# Patient Record
Sex: MALE | Race: WHITE | Employment: FULL TIME | URBAN - METROPOLITAN AREA
[De-identification: names, ages, dates, MRNs, and addresses within clinical notes are randomized per-mention and may not be internally consistent; named-entity substitution may affect disease eponyms.]

---

## 2021-09-03 ENCOUNTER — OFFICE VISIT (OUTPATIENT)
Dept: OTOLARYNGOLOGY | Facility: CLINIC | Age: 46
End: 2021-09-03
Payer: COMMERCIAL

## 2021-09-03 VITALS
WEIGHT: 224.4 LBS | DIASTOLIC BLOOD PRESSURE: 87 MMHG | HEART RATE: 95 BPM | BODY MASS INDEX: 31.42 KG/M2 | OXYGEN SATURATION: 99 % | HEIGHT: 71 IN | SYSTOLIC BLOOD PRESSURE: 143 MMHG

## 2021-09-03 DIAGNOSIS — J33.9 NASAL POLYPOSIS: ICD-10-CM

## 2021-09-03 DIAGNOSIS — G47.33 OSA (OBSTRUCTIVE SLEEP APNEA): Primary | ICD-10-CM

## 2021-09-03 DIAGNOSIS — J31.0 CHRONIC RHINITIS: ICD-10-CM

## 2021-09-03 PROCEDURE — 99204 OFFICE O/P NEW MOD 45 MIN: CPT | Performed by: OTOLARYNGOLOGY

## 2021-09-03 RX ORDER — AZELASTINE HYDROCHLORIDE, FLUTICASONE PROPIONATE 137; 50 UG/1; UG/1
SPRAY, METERED NASAL 2 TIMES DAILY
COMMUNITY
End: 2022-06-21

## 2021-09-03 RX ORDER — FLUTICASONE PROPIONATE 50 MCG
1 SPRAY, SUSPENSION (ML) NASAL DAILY
COMMUNITY
End: 2021-12-06

## 2021-09-03 RX ORDER — BUDESONIDE 0.5 MG/2ML
0.5 INHALANT ORAL DAILY
Qty: 60 ML | Refills: 11 | Status: SHIPPED | OUTPATIENT
Start: 2021-09-03 | End: 2022-04-04

## 2021-09-03 RX ORDER — MONTELUKAST SODIUM 10 MG/1
10 TABLET ORAL
COMMUNITY
Start: 2020-12-09 | End: 2022-04-28

## 2021-09-03 ASSESSMENT — MIFFLIN-ST. JEOR: SCORE: 1925

## 2021-09-03 NOTE — LETTER
"    9/3/2021         RE: Silviano Collins  [address] [facility] [address]        Dear Colleague,    Thank you for referring your patient, Silviano Collins, to the [facility]. Please see a copy of my visit note below.    History of Present Illness - Silviano Collins is a very pleasant 45 year old male here to see me for the first time due to nasal issues.    He constantly has issues with swelling of the face under the eyes.  He especially gets issues with the changes of the seasons.  He had previous functional endoscopic sinus surgery years ago, but he does not feel that it made much of a difference.    He has had allergy testing, and he tells me that it was \"everything.\"  He was tried on Flonase, Azelastine.  He was doing both, and using them twice daily.  Singulair, and Advair.    He was previously told by his [facility] sleep doctor that he needs to get his supplies through [facility] now.    Past Medical History -   Patient Active Problem List   Diagnosis     Allergic rhinitis due to other allergen     Mild intermittent asthma       Current Medications -   Current Outpatient Medications:      MULTI-VITAMIN OR TABS, 1 tab daily, Disp: , Rfl:      ZYRTEC 10 MG OR TABS, 1 tab daily prn , Disp: 30, Rfl: 10    Allergies -   Allergies   Allergen Reactions     No Known Drug Allergies        Social History -   Social History     Socioeconomic History     Marital status: Single     Spouse name: Not on file     Number of children: Not on file     Years of education: Not on file     Highest education level: Not on file   Occupational History     Not on file   Tobacco Use     Smoking status: Current Every Day Smoker     Tobacco comment: socially   Substance and Sexual Activity     Alcohol use: Yes     Comment: 2x month     Drug use: Not on file     Sexual activity: Not on file   Other Topics Concern     Not on file   Social History Narrative     Not on file     Social Determinants " "of Health     Financial Resource Strain:      Difficulty of Paying Living Expenses:    Food Insecurity:      Worried About Running Out of Food in the Last Year:      Ran Out of Food in the Last Year:    Transportation Needs:      Lack of Transportation (Medical):      Lack of Transportation (Non-Medical):    Physical Activity:      Days of Exercise per Week:      Minutes of Exercise per Session:    Stress:      Feeling of Stress :    Social Connections:      Frequency of Communication with Friends and Family:      Frequency of Social Gatherings with Friends and Family:      Attends Islam Services:      Active Member of Clubs or Organizations:      Attends Club or Organization Meetings:      Marital Status:    Intimate Partner Violence:      Fear of Current or Ex-Partner:      Emotionally Abused:      Physically Abused:      Sexually Abused:        Family History -   Family History   Problem Relation Age of Onset     Diabetes Mother         D: 40 pneumonia,  Type I DM     Family History Negative Father      Family History Negative Brother        Review of Systems - As per HPI and PMHx, otherwise 10+ system review of the head and neck, and general constitution is negative.    Physical Exam  BP (!) 143/87   Pulse 95   Ht 1.803 m (5' 11\")   Wt 101.8 kg (224 lb 6.4 oz)   SpO2 99%   BMI 31.30 kg/m      General - The patient is well nourished and well developed, and appears to have good nutritional status.  Alert and oriented to person and place, answers questions and cooperates with examination appropriately.   Head and Face - Normocephalic and atraumatic, with no gross asymmetry noted of the contour of the facial features other than some swelling of the LEFT upper cheek and lower LEFT lid.  The facial nerve is intact, with strong symmetric movements.  Voice and Breathing - The patient was breathing comfortably without the use of accessory muscles. There was no wheezing, stridor, or stertor.  The patients voice " was clear and strong, and had appropriate pitch and quality.  Ears - The tympanic membranes are normal in appearance, bony landmarks are intact.  No retraction, perforation, or masses.  No fluid or purulence was seen in the external canal or the middle ear. No evidence of infection of the middle ear or external canal, cerumen was normal in appearance.  Eyes - Extraocular movements intact, and the pupils were reactive to light.  Sclera were not icteric or injected, conjunctiva were pink and moist.  Mouth - Examination of the oral cavity showed pink, healthy oral mucosa. No lesions or ulcerations noted.  The tongue was mobile and midline, and the dentition were in good condition.    Throat - The walls of the oropharynx were smooth, pink, moist, symmetric, and had no lesions or ulcerations.  The tonsillar pillars and soft palate were symmetric.  The uvula was midline on elevation.    Neck - Normal midline excursion of the laryngotracheal complex during swallowing.  Full range of motion on passive movement.  Palpation of the occipital, submental, submandibular, internal jugular chain, and supraclavicular nodes did not demonstrate any abnormal lymph nodes or masses.  The carotid pulse was palpable bilaterally.  Palpation of the thyroid was soft and smooth, with no nodules or goiter appreciated.  The trachea was mobile and midline.  Nose - External contour is symmetric, no gross deflection or scars.  Nasal mucosa is globally boggy and edematous with copious clear secretions.  I believe I can see a polyp on the LEFT side between the septum and middle turbiante      A/P - Silviano Collins is a 45 year old male  (G47.33) KRISTEL (obstructive sleep apnea)  (primary encounter diagnosis)  (J31.0) Chronic rhinitis  (J33.9) Nasal polyposis    The first thing I would add to his allergy management is Budesonide mixed into NeilMed saline irrigations, and do that daily.    Next, since he is establishing care with me, I would like a CT  sinus for a baseline, and will call him with results.  Hopefully it will not show surgical disease.    Finally, I would also the scan because the swelling under the LEFT eye is likely allergic rhinitis side effect, but there might be a very subtle proptosis, and I would a view of his LEFT orbit as well.      Again, thank you for allowing me to participate in the care of your patient.        Sincerely,        Evens Srivastava MD

## 2021-09-03 NOTE — PROGRESS NOTES
"History of Present Illness - Silviano Collins is a very pleasant 45 year old male here to see me for the first time due to nasal issues.    He constantly has issues with swelling of the face under the eyes.  He especially gets issues with the changes of the seasons.  He had previous functional endoscopic sinus surgery years ago, but he does not feel that it made much of a difference.    He has had allergy testing, and he tells me that it was \"everything.\"  He was tried on Flonase, Azelastine.  He was doing both, and using them twice daily.  Singulair, and Advair.    He was previously told by his [facility] sleep doctor that he needs to get his supplies through [facility] now.    Past Medical History -   Patient Active Problem List   Diagnosis     Allergic rhinitis due to other allergen     Mild intermittent asthma       Current Medications -   Current Outpatient Medications:      MULTI-VITAMIN OR TABS, 1 tab daily, Disp: , Rfl:      ZYRTEC 10 MG OR TABS, 1 tab daily prn , Disp: 30, Rfl: 10    Allergies -   Allergies   Allergen Reactions     No Known Drug Allergies        Social History -   Social History     Socioeconomic History     Marital status: Single     Spouse name: Not on file     Number of children: Not on file     Years of education: Not on file     Highest education level: Not on file   Occupational History     Not on file   Tobacco Use     Smoking status: Current Every Day Smoker     Tobacco comment: socially   Substance and Sexual Activity     Alcohol use: Yes     Comment: 2x month     Drug use: Not on file     Sexual activity: Not on file   Other Topics Concern     Not on file   Social History Narrative     Not on file     Social Determinants of Health     Financial Resource Strain:      Difficulty of Paying Living Expenses:    Food Insecurity:      Worried About Running Out of Food in the Last Year:      Ran Out of Food in the Last Year:    Transportation Needs:      Lack of Transportation (Medical):      " "Lack of Transportation (Non-Medical):    Physical Activity:      Days of Exercise per Week:      Minutes of Exercise per Session:    Stress:      Feeling of Stress :    Social Connections:      Frequency of Communication with Friends and Family:      Frequency of Social Gatherings with Friends and Family:      Attends Tenriism Services:      Active Member of Clubs or Organizations:      Attends Club or Organization Meetings:      Marital Status:    Intimate Partner Violence:      Fear of Current or Ex-Partner:      Emotionally Abused:      Physically Abused:      Sexually Abused:        Family History -   Family History   Problem Relation Age of Onset     Diabetes Mother         D: 40 pneumonia,  Type I DM     Family History Negative Father      Family History Negative Brother        Review of Systems - As per HPI and PMHx, otherwise 10+ system review of the head and neck, and general constitution is negative.    Physical Exam  BP (!) 143/87   Pulse 95   Ht 1.803 m (5' 11\")   Wt 101.8 kg (224 lb 6.4 oz)   SpO2 99%   BMI 31.30 kg/m      General - The patient is well nourished and well developed, and appears to have good nutritional status.  Alert and oriented to person and place, answers questions and cooperates with examination appropriately.   Head and Face - Normocephalic and atraumatic, with no gross asymmetry noted of the contour of the facial features other than some swelling of the LEFT upper cheek and lower LEFT lid.  The facial nerve is intact, with strong symmetric movements.  Voice and Breathing - The patient was breathing comfortably without the use of accessory muscles. There was no wheezing, stridor, or stertor.  The patients voice was clear and strong, and had appropriate pitch and quality.  Ears - The tympanic membranes are normal in appearance, bony landmarks are intact.  No retraction, perforation, or masses.  No fluid or purulence was seen in the external canal or the middle ear. No evidence " of infection of the middle ear or external canal, cerumen was normal in appearance.  Eyes - Extraocular movements intact, and the pupils were reactive to light.  Sclera were not icteric or injected, conjunctiva were pink and moist.  Mouth - Examination of the oral cavity showed pink, healthy oral mucosa. No lesions or ulcerations noted.  The tongue was mobile and midline, and the dentition were in good condition.    Throat - The walls of the oropharynx were smooth, pink, moist, symmetric, and had no lesions or ulcerations.  The tonsillar pillars and soft palate were symmetric.  The uvula was midline on elevation.    Neck - Normal midline excursion of the laryngotracheal complex during swallowing.  Full range of motion on passive movement.  Palpation of the occipital, submental, submandibular, internal jugular chain, and supraclavicular nodes did not demonstrate any abnormal lymph nodes or masses.  The carotid pulse was palpable bilaterally.  Palpation of the thyroid was soft and smooth, with no nodules or goiter appreciated.  The trachea was mobile and midline.  Nose - External contour is symmetric, no gross deflection or scars.  Nasal mucosa is globally boggy and edematous with copious clear secretions.  I believe I can see a polyp on the LEFT side between the septum and middle turbiante      A/P - Silviano Collins is a 45 year old male  (G47.33) KRISTEL (obstructive sleep apnea)  (primary encounter diagnosis)  (J31.0) Chronic rhinitis  (J33.9) Nasal polyposis    The first thing I would add to his allergy management is Budesonide mixed into NeilMed saline irrigations, and do that daily.    Next, since he is establishing care with me, I would like a CT sinus for a baseline, and will call him with results.  Hopefully it will not show surgical disease.    Finally, I would also the scan because the swelling under the LEFT eye is likely allergic rhinitis side effect, but there might be a very subtle proptosis, and I would  a view of his LEFT orbit as well.

## 2021-09-05 ENCOUNTER — TELEPHONE (OUTPATIENT)
Dept: SLEEP MEDICINE | Facility: CLINIC | Age: 46
End: 2021-09-05

## 2021-09-05 NOTE — TELEPHONE ENCOUNTER
Reason for call:  Other   Patient called regarding (reason for call): call back  Additional comments: refill     Phone number to reach patient:  Home number on file [phone] (home)    Best Time:  any    Can we leave a detailed message on this number?  YES    Travel screening: Not Applicable

## 2021-10-26 ENCOUNTER — VIRTUAL VISIT (OUTPATIENT)
Dept: FAMILY MEDICINE | Facility: CLINIC | Age: 46
End: 2021-10-26
Payer: COMMERCIAL

## 2021-10-26 DIAGNOSIS — R21 RASH: Primary | ICD-10-CM

## 2021-10-26 PROCEDURE — 99203 OFFICE O/P NEW LOW 30 MIN: CPT | Mod: 95 | Performed by: NURSE PRACTITIONER

## 2021-10-26 RX ORDER — CLOTRIMAZOLE 1 %
CREAM (GRAM) TOPICAL 2 TIMES DAILY
Qty: 30 G | Refills: 0 | Status: SHIPPED | OUTPATIENT
Start: 2021-10-26 | End: 2021-12-23

## 2021-10-26 RX ORDER — TRIAMCINOLONE ACETONIDE 1 MG/G
CREAM TOPICAL 2 TIMES DAILY
Qty: 30 G | Refills: 0 | Status: SHIPPED | OUTPATIENT
Start: 2021-10-26 | End: 2021-12-23

## 2021-10-26 NOTE — PROGRESS NOTES
"Juanjose is a 46 year old who is being evaluated via a billable video visit.      How would you like to obtain your AVS? MyChart  If the video visit is dropped, the invitation should be resent by: Text to cell phone: [phone] TEXT PT\"S PHONE FOR VISIT  Will anyone else be joining your video visit? No      Video Start Time: 8:10 AM    Assessment & Plan     Rash  Etiology unclear. Discussed using topical steroid and antifungal together. Follow up in 2 weeks if no improvement.   - triamcinolone (KENALOG) 0.1 % external cream; Apply topically 2 times daily For up to 14 days.  - clotrimazole (LOTRIMIN) 1 % external cream; Apply topically 2 times daily For 14 DAYS       BMI:   Estimated body mass index is 31.3 kg/m  as calculated from the following:    Height as of 9/3/21: 1.803 m (5' 11\").    Weight as of 9/3/21: 101.8 kg (224 lb 6.4 oz).       Patient Instructions   Start with the triamcinolone twice daily alone or with the clotrimazole.  If no improvement in 2 weeks follow-up.  If this is fungal it can take up to a month for this to clear.  I would expect to see improvement in 2 weeks though.    If worsening follow-up in clinic.      No follow-ups on file.    Portia Brumfield, RYAN CNP  [facility]    Jose Guadalupe Sow is a 46 year old who presents for the following health issues     HPI     Chief Complaint   Patient presents with     spot on foot     pt has a red spot on right foot that itches size of a golfball      Circular raised lesion on the right foot that is above the 3-5 toes. It has been present for about 6 weeks. Topical treatments tried lotion, hydrocortisone cream. This seemed to dry it up. Patient reports that it looks better than it did, it is less red. He tried an antifungal terbinafine OTC, this did not change symptoms after bid for 4 days.         Review of Systems   Constitutional, HEENT, cardiovascular, pulmonary, gi and gu systems are negative, except as otherwise noted.    "   Objective           Vitals:  No vitals were obtained today due to virtual visit.    Physical Exam   GENERAL: Healthy, alert and no distress  EYES: Eyes grossly normal to inspection.  No discharge or erythema, or obvious scleral/conjunctival abnormalities.  RESP: No audible wheeze, cough, or visible cyanosis.  No visible retractions or increased work of breathing.    SKIN: well demarcated circular rash on right dorsal surface. Appears approx 3cm diameter.  Erythematous with skin flaking. NEURO: Cranial nerves grossly intact.  Mentation and speech appropriate for age.  PSYCH: Mentation appears normal, affect normal/bright, judgement and insight intact, normal speech and appearance well-groomed.                Video-Visit Details    Type of service:  Video Visit    Video End Time:8:23 AM    Originating Location (pt. Location): Home    Distant Location (provider location):  [facility]     Platform used for Video Visit: FIXO

## 2021-10-26 NOTE — PATIENT INSTRUCTIONS
Start with the triamcinolone twice daily alone or with the clotrimazole.  If no improvement in 2 weeks follow-up.  If this is fungal it can take up to a month for this to clear.  I would expect to see improvement in 2 weeks though.    If worsening follow-up in clinic.

## 2021-10-27 ENCOUNTER — TELEPHONE (OUTPATIENT)
Dept: FAMILY MEDICINE | Facility: CLINIC | Age: 46
End: 2021-10-27

## 2021-10-27 DIAGNOSIS — J45.20 MILD INTERMITTENT ASTHMA WITHOUT COMPLICATION: Primary | ICD-10-CM

## 2021-10-27 NOTE — TELEPHONE ENCOUNTER
Reason for call:  Patient reporting a symptom    Symptom or request: Pt states that he was seen virtually yesterday with Portia Brumfield and forgot to ask for a refill of Advair?  Does he need another appt to get this med refill?  [facility]  Please call patient and advise.      Duration (how long have symptoms been present): ongoing    Have you been treated for this before? Yes    Additional comments:     Phone Number patient can be reached at:  Home number on file [phone] (home)    Best Time:  any    Can we leave a detailed message on this number:  YES    Call taken on 10/27/2021 at 9:54 AM by Mari Sotne

## 2021-11-13 ENCOUNTER — HEALTH MAINTENANCE LETTER (OUTPATIENT)
Age: 46
End: 2021-11-13

## 2021-11-17 ENCOUNTER — MYC MEDICAL ADVICE (OUTPATIENT)
Dept: FAMILY MEDICINE | Facility: CLINIC | Age: 46
End: 2021-11-17
Payer: COMMERCIAL

## 2021-11-19 ENCOUNTER — HOSPITAL ENCOUNTER (EMERGENCY)
Facility: CLINIC | Age: 46
Discharge: HOME | End: 2021-11-20
Attending: EMERGENCY MEDICINE | Admitting: EMERGENCY MEDICINE
Payer: COMMERCIAL

## 2021-11-19 DIAGNOSIS — J18.9 COMMUNITY ACQUIRED PNEUMONIA OF RIGHT MIDDLE LOBE OF LUNG: ICD-10-CM

## 2021-11-19 DIAGNOSIS — J45.31 MILD PERSISTENT ASTHMA WITH EXACERBATION: ICD-10-CM

## 2021-11-19 PROCEDURE — 99284 EMERGENCY DEPT VISIT MOD MDM: CPT | Performed by: EMERGENCY MEDICINE

## 2021-11-19 PROCEDURE — 250N000012 HC RX MED GY IP 250 OP 636 PS 637: Performed by: EMERGENCY MEDICINE

## 2021-11-19 PROCEDURE — 250N000013 HC RX MED GY IP 250 OP 250 PS 637: Performed by: EMERGENCY MEDICINE

## 2021-11-19 PROCEDURE — C9803 HOPD COVID-19 SPEC COLLECT: HCPCS | Performed by: EMERGENCY MEDICINE

## 2021-11-19 PROCEDURE — 99284 EMERGENCY DEPT VISIT MOD MDM: CPT | Mod: 25 | Performed by: EMERGENCY MEDICINE

## 2021-11-19 PROCEDURE — 94640 AIRWAY INHALATION TREATMENT: CPT | Performed by: EMERGENCY MEDICINE

## 2021-11-19 RX ORDER — PREDNISONE 20 MG/1
60 TABLET ORAL ONCE
Status: COMPLETED | OUTPATIENT
Start: 2021-11-19 | End: 2021-11-19

## 2021-11-19 RX ORDER — INHALER, ASSIST DEVICES
1 SPACER (EA) MISCELLANEOUS ONCE
Status: COMPLETED | OUTPATIENT
Start: 2021-11-19 | End: 2021-11-19

## 2021-11-19 RX ORDER — ALBUTEROL SULFATE 90 UG/1
6 AEROSOL, METERED RESPIRATORY (INHALATION) ONCE
Status: COMPLETED | OUTPATIENT
Start: 2021-11-19 | End: 2021-11-19

## 2021-11-19 RX ADMIN — PREDNISONE 60 MG: 20 TABLET ORAL at 23:55

## 2021-11-19 RX ADMIN — ALBUTEROL SULFATE 6 PUFF: 90 AEROSOL, METERED RESPIRATORY (INHALATION) at 23:56

## 2021-11-19 RX ADMIN — Medication 1 EACH: at 23:56

## 2021-11-19 ASSESSMENT — MIFFLIN-ST. JEOR: SCORE: 1945.4

## 2021-11-20 ENCOUNTER — APPOINTMENT (OUTPATIENT)
Dept: GENERAL RADIOLOGY | Facility: CLINIC | Age: 46
End: 2021-11-20
Attending: EMERGENCY MEDICINE
Payer: COMMERCIAL

## 2021-11-20 VITALS
BODY MASS INDEX: 32.2 KG/M2 | HEIGHT: 71 IN | OXYGEN SATURATION: 96 % | SYSTOLIC BLOOD PRESSURE: 142 MMHG | HEART RATE: 85 BPM | RESPIRATION RATE: 20 BRPM | WEIGHT: 230 LBS | DIASTOLIC BLOOD PRESSURE: 92 MMHG | TEMPERATURE: 98.3 F

## 2021-11-20 LAB
FLUAV RNA SPEC QL NAA+PROBE: NEGATIVE
FLUBV RNA RESP QL NAA+PROBE: NEGATIVE
SARS-COV-2 RNA RESP QL NAA+PROBE: NEGATIVE

## 2021-11-20 PROCEDURE — 87636 SARSCOV2 & INF A&B AMP PRB: CPT | Performed by: EMERGENCY MEDICINE

## 2021-11-20 PROCEDURE — 71045 X-RAY EXAM CHEST 1 VIEW: CPT

## 2021-11-20 PROCEDURE — C9803 HOPD COVID-19 SPEC COLLECT: HCPCS | Performed by: EMERGENCY MEDICINE

## 2021-11-20 RX ORDER — PREDNISONE 20 MG/1
40 TABLET ORAL DAILY
Qty: 10 TABLET | Refills: 0 | Status: SHIPPED | OUTPATIENT
Start: 2021-11-20 | End: 2021-12-23

## 2021-11-20 NOTE — ED PROVIDER NOTES
History     Chief Complaint   Patient presents with     Shortness of Breath     HPI  Silviano Collins is a 46 year old male who is fully vaccinated against COVID-19 with recent booster and presents now with cough and fever.  Symptoms ongoing over the past 5 days.  He has had intermittent sweats and chills.  Headache, runny nose, sore throat, cough productive of green sputum.  He feels weak and rundown.  No vomiting or diarrhea.  No abdominal pain, dysuria, or rash.  He has been using his inhalers with mild improvement.    Allergies:  Allergies   Allergen Reactions     Grass Extracts [Gramineae Pollens] Other (See Comments)     No Known Drug Allergies        Problem List:    Patient Active Problem List    Diagnosis Date Noted     Allergic rhinitis due to other allergen 11/30/2005     Priority: Medium     seasonal allergies (spring and fall)       Mild intermittent asthma 11/30/2005     Priority: Medium     exercise, seasonal, associated with URIs          Past Medical History:    Past Medical History:   Diagnosis Date     Allergic rhinitis due to other allergen      Mild intermittent asthma        Past Surgical History:    Past Surgical History:   Procedure Laterality Date     HC TOOTH EXTRACTION W/FORCEP  1995    4 wisdom teeth removed withotu complications       Family History:    Family History   Problem Relation Age of Onset     Diabetes Mother         D: 40 pneumonia,  Type I DM     Family History Negative Father      Family History Negative Brother        Social History:  Marital Status:   [2]  Social History     Tobacco Use     Smoking status: Former Smoker     Smokeless tobacco: Never Used     Tobacco comment: socially   Vaping Use     Vaping Use: Never used   Substance Use Topics     Alcohol use: Yes     Comment: 2x month     Drug use: Not on file        Medications:    predniSONE (DELTASONE) 20 MG tablet  azelastine-fluticasone (DYMISTA) 137-50 MCG/ACT nasal spray  budesonide (PULMICORT) 0.5  "MG/2ML neb solution  clotrimazole (LOTRIMIN) 1 % external cream  fluticasone (FLONASE) 50 MCG/ACT nasal spray  fluticasone-salmeterol (ADVAIR) 100-50 MCG/DOSE inhaler  montelukast (SINGULAIR) 10 MG tablet  MULTI-VITAMIN OR TABS  triamcinolone (KENALOG) 0.1 % external cream  ZYRTEC 10 MG OR TABS          Review of Systems  Pertinent positives and negatives listed in the HPI, all other systems reviewed and are negative.    Physical Exam   BP: (!) 134/96  Pulse: 85  Resp: 20  Height: 180.3 cm (5' 11\")  Weight: 104.3 kg (230 lb)  SpO2: 96 %      Physical Exam  Vitals and nursing note reviewed.   Constitutional:       General: He is in acute distress.      Appearance: He is well-developed. He is not diaphoretic.   HENT:      Head: Normocephalic and atraumatic.      Right Ear: Tympanic membrane and external ear normal.      Left Ear: Tympanic membrane and external ear normal.      Nose: Nose normal.      Mouth/Throat:      Pharynx: No pharyngeal swelling, oropharyngeal exudate or posterior oropharyngeal erythema.      Tonsils: No tonsillar exudate or tonsillar abscesses.   Eyes:      General: No scleral icterus.     Conjunctiva/sclera: Conjunctivae normal.   Cardiovascular:      Rate and Rhythm: Normal rate and regular rhythm.      Heart sounds: No murmur heard.      Pulmonary:      Effort: Pulmonary effort is normal. No respiratory distress.      Breath sounds: No stridor. Wheezing present.   Abdominal:      General: There is no distension.      Palpations: Abdomen is soft.   Musculoskeletal:      Cervical back: Normal range of motion.      Right lower leg: No edema.      Left lower leg: No edema.   Lymphadenopathy:      Cervical: No cervical adenopathy.   Skin:     General: Skin is warm and dry.   Neurological:      Mental Status: He is alert and oriented to person, place, and time.   Psychiatric:         Behavior: Behavior normal.         ED Course        Procedures              Critical Care time:  none           "     Results for orders placed or performed during the hospital encounter of 11/19/21 (from the past 24 hour(s))   Symptomatic Influenza A/B & SARS-CoV2 (COVID-19) Virus PCR Multiplex Nasopharyngeal    Specimen: Nasopharyngeal; Swab   Result Value Ref Range    Influenza A target Negative Negative    Influenza B target Negative Negative    SARS CoV2 PCR Negative Negative    Narrative    Testing was performed using the marisel SARS-CoV-2 & Influenza A/B Assay on the marisel Caroline System. This test should be ordered for the detection of SARS-CoV-2 and influenza viruses in individuals who meet clinical and/or epidemiological criteria. Test performance is unknown in asymptomatic patients. This test is for in vitro diagnostic use under the FDA EUA for laboratories certified under CLIA to perform moderate and/or high complexity testing. This test has not been FDA cleared or approved. A negative result does not rule out the presence of PCR inhibitors in the specimen or target RNA in concentration below the limit of detection for the assay. If only one viral target is positive but coinfection with multiple targets is suspected, the sample should be re-tested with another FDA cleared, approved or authorized test, if coinfection would change clinical management. [facility] are certified under the Clinical Laboratory Improvement Amendments of 1988 (CLIA-88) as  qualified to perform moderate and/or high complexity laboratory testing.       Medications   albuterol (PROAIR HFA/PROVENTIL HFA/VENTOLIN HFA) 108 (90 Base) MCG/ACT inhaler 6 puff (6 puffs Inhalation Given 11/19/21 2356)   aerochamber with mouthpiece (NO mask) - > 5 years 1 each (1 each Inhalation Given 11/19/21 2356)   predniSONE (DELTASONE) tablet 60 mg (60 mg Oral Given 11/19/21 2355)       Assessments & Plan (with Medical Decision Making)   46-year-old male presents for cough and fever with wheezing with a history of asthma.  Heart 82, SPO2 is 96% on  room air, blood pressure is 134/96.  He is given albuterol and prednisone for his symptoms.  Chest x-ray obtained, images reviewed independently as well as radiology read reviewed, no signs of infiltrate to suggest pneumonia. Influenza and Covid swabs are negative. He is feeling better after the albuterol and is safe to discharge with reassurance and a short course of prednisone and instructions to drink plenty fluids, use albuterol as needed, return if worse, otherwise follow-up in clinic. The patient is in agreement with this plan.    I have reviewed the nursing notes.    I have reviewed the findings, diagnosis, plan and need for follow up with the patient.       New Prescriptions    PREDNISONE (DELTASONE) 20 MG TABLET    Take 2 tablets (40 mg) by mouth daily       Final diagnoses:   Mild persistent asthma with exacerbation   Viral URI with cough       11/19/2021   [facility] EMERGENCY DEPT     Usama Espinoza MD  11/20/21 0042

## 2021-11-20 NOTE — ED TRIAGE NOTES
"Cough and congestion since Monday had fever \"earlier in the week\"     Brings up green sputum     Increased difficulty breathing at night     scratchy voice  "

## 2021-12-06 ENCOUNTER — OFFICE VISIT (OUTPATIENT)
Dept: SLEEP MEDICINE | Facility: CLINIC | Age: 46
End: 2021-12-06
Payer: COMMERCIAL

## 2021-12-06 VITALS
OXYGEN SATURATION: 97 % | SYSTOLIC BLOOD PRESSURE: 123 MMHG | DIASTOLIC BLOOD PRESSURE: 84 MMHG | HEIGHT: 71 IN | WEIGHT: 224 LBS | HEART RATE: 74 BPM | BODY MASS INDEX: 31.36 KG/M2

## 2021-12-06 DIAGNOSIS — G47.33 OSA (OBSTRUCTIVE SLEEP APNEA): Primary | ICD-10-CM

## 2021-12-06 PROBLEM — E11.9 CONTROLLED TYPE 2 DIABETES MELLITUS WITHOUT COMPLICATION, WITHOUT LONG-TERM CURRENT USE OF INSULIN (H): Status: ACTIVE | Noted: 2020-02-19

## 2021-12-06 PROBLEM — E11.9 CONTROLLED TYPE 2 DIABETES MELLITUS WITHOUT COMPLICATION, WITHOUT LONG-TERM CURRENT USE OF INSULIN (H): Chronic | Status: ACTIVE | Noted: 2020-02-19

## 2021-12-06 PROCEDURE — 99204 OFFICE O/P NEW MOD 45 MIN: CPT | Performed by: PHYSICIAN ASSISTANT

## 2021-12-06 RX ORDER — FLUTICASONE PROPIONATE 50 MCG
1 SPRAY, SUSPENSION (ML) NASAL DAILY
Qty: 16 G | Refills: 3 | Status: SHIPPED | OUTPATIENT
Start: 2021-12-06 | End: 2022-04-28

## 2021-12-06 ASSESSMENT — MIFFLIN-ST. JEOR: SCORE: 1918.19

## 2021-12-06 NOTE — PATIENT INSTRUCTIONS
Your BMI is Body mass index is 31.24 kg/m .  Weight management is a personal decision.  If you are interested in exploring weight loss strategies, the following discussion covers the approaches that may be successful. Body mass index (BMI) is one way to tell whether you are at a healthy weight, overweight, or obese. It measures your weight in relation to your height.  A BMI of 18.5 to 24.9 is in the healthy range. A person with a BMI of 25 to 29.9 is considered overweight, and someone with a BMI of 30 or greater is considered obese. More than two-thirds of American adults are considered overweight or obese.  Being overweight or obese increases the risk for further weight gain. Excess weight may lead to heart disease and diabetes.  Creating and following plans for healthy eating and physical activity may help you improve your health.  Weight control is part of healthy lifestyle and includes exercise, emotional health, and healthy eating habits. Careful eating habits lifelong are the mainstay of weight control. Though there are significant health benefits from weight loss, long-term weight loss with diet alone may be very difficult to achieve- studies show long-term success with dietary management in less than 10% of people. Attaining a healthy weight may be especially difficult to achieve in those with severe obesity. In some cases, medications, devices and surgical management might be considered.  What can you do?  If you are overweight or obese and are interested in methods for weight loss, you should discuss this with your provider.     Consider reducing daily calorie intake by 500 calories.     Keep a food journal.     Avoiding skipping meals, consider cutting portions instead.    Diet combined with exercise helps maintain muscle while optimizing fat loss. Strength training is particularly important for building and maintaining muscle mass. Exercise helps reduce stress, increase energy, and improves fitness.  Increasing exercise without diet control, however, may not burn enough calories to loose weight.       Start walking three days a week 10-20 minutes at a time    Work towards walking thirty minutes five days a week     Eventually, increase the speed of your walking for 1-2 minutes at time    In addition, we recommend that you review healthy lifestyles and methods for weight loss available through the National Institutes of Health patient information sites:  http://win.niddk.nih.gov/publications/index.htm    And look into health and wellness programs that may be available through your health insurance provider, employer, local community center, or barrie club.    Weight management plan: Patient was referred to their PCP to discuss a diet and exercise plan.      Your sleep apnea treatment may be affected by device recall    Our records show that you may have a [employer] Respironics CPAP for the treatment of sleep apnea. Many of these devices have been recalled* by the  for replacement. [facility] recommends:     1) If you are using a Resmed device, continue using the device.  2) If you have a [employer] Respironics device, register your device for confirmation of type of device and repair of the device at https://[email or web address]/e/sleep/communications/src-update -if you cannot use link, call [phone].  The website will assist you in obtaining the serial number for registration.   3) If you are using a [employer] Respironics CPAP or Bilevel PAP device and you do not have immediate breathing, driving or cardiovascular risks without the device, consider stopping use of the device after verification that is has been recalled. Discuss this decision with your medical provider if you are uncertain about your medical risks.  4) If you are not using Respironics CPAP but are using a Respironics advanced device for breathing support (AVAPS, ASV, Bilevel PAP), continue using the device and  review 5 and 6 below).     5) If you continue the device, do not include ozone generating  connected to PAP devices.  6) Bacterial filters to reduce exposure to particulates are sometimes cumbersome to use and are not currently recommended by the .    ?       You may also choose discuss with your provider alternative approaches to treatment.      *[employer] is voluntarily recalling the below devices due to two (2) issues related to the polyester-based polyurethane (PE-PUR) sound abatement foam used in [employer] Continuous and Non-Continuous Ventilators: 1) PE-PUR foam may degrade into particles which may enter the device's the air pathway and be ingested or inhaled by the user, and 2) the PE-PUR foam may off-gas certain chemicals. The foam degradation may be exacerbated by use of unapproved cleaning methods, such as ozone (see FDA safety communication on use of Ozone ), and off-gassing may occur during initial operation and may possibly continue throughout the device's useful life.   These issues can result in serious injury which can be life-threatening, cause permanent impairment, and/or require medical intervention to preclude permanent impairment. To date, [place] has received several complaints regarding the presence of black debris/particles within the airpath circuit (extending from the device outlet, humidifier, tubing, and mask). [facility] also has received reports of headache, upper airway irritation, cough, chest pressure and sinus infection. The potential risks of particulate exposure include: Irritation (skin, eye, and respiratory tract), inflammatory response, headache, asthma, adverse effects to other organs (e.g. kidneys and liver) and toxic carcinogenic affects. The potential risks of chemical exposure due to off-gassing include: headache/dizziness, irritation (eyes, nose, respiratory tract, skin), hypersensitivity, nausea/vomiting, toxic and  carcinogenic effects. There have been no reports of death as a result of these issues.    Actions to be taken:  Discontinue the use of your device.  Do not continue to use ozone  with the device.     [place] affected devices on the recall website, www.[employer]/SRC-update    i. The website provides current information on the status of the recall and how  to receive permanent corrective action to address the two issues.    ii. The website also provides instructions on how to locate an affected device  Serial Number and will guide users through the registration process.    iii. In the [place], call [phone] Service Hotline if you cannot visit the website

## 2021-12-06 NOTE — PROGRESS NOTES
Sleep Consultation:    Date on this visit: 12/6/2021    Silviano Collins is sent by Evens Srivastava for a sleep consultation regarding obstructive sleep apna.    Primary Physician: No Ref-Primary, Physician     Chief Complaint   Patient presents with     CPAP Follow Up     Patient needs new CPAP supplies and possible new CPAP due to recall. Patient stopped using CPAP because he thought it was broken.        Silviano Collins is a 46 year old male with medical history remarkable for asthma, obesity and obstructive sleep apnea.     Patient initially presented in 2012 to [facility] for snoring and excessive daytime sleepiness (ESS 16). PSG recommended.     Sleep study done at [facility] on 8/17/2021 (228#)-AHI 39, RDI 83,  lowest oxygen saturation was 81%, CPAP 12 cm/H20.    He was set up with CPAP then, pressure of 12-20, and has been using it regularly.    Overall, the patient rates his experience with PAP as 6.5 (0 poor, 10 great). The mask is comfortable. The mask is not leaking.  He is snoring with the mask on occasionally. He is not having gasp arousals. He is not having any oral or nasal dryness. The pressure settings are comfortable    His PAP interface is Full Face Mask.    Bedtime is typically 10 PM. Usually it takes about 15 minutes to fall asleep with the mask on. Wake time is typically 7:30 AM.  Patient is using PAP therapy - hours per night. The patient is usually getting 8 hours of sleep per night.    He does feel rested in the morning.    Total score - [place]: 13 (12/6/2021  1:00 PM). He reports taking a stimulant as needed when driving. He started taking this medication 6 months ago.     Respironics  Auto-PAP 12 - 20 cmH2O 30 day usage data:    60% of days with > 4 hours of use. 1/30 days with no use.   Average use 4 hours and 58 minutes per day.   Average large leak 49 seconds.   CPAP 90% pressure 16.9 cm.   AHI 2.5 events per hour.    He stopped using his  CPAP because it stopped working possibly due to an outlet issue. He does not know if his machine is subject to recall.     Patient sleeps on his side. He denies no morning headaches, morning confusion and restless legs. Silviano denies any bruxism, sleep walking, sleep talking, dream enactment, sleep paralysis, cataplexy and hypnogogic/hypnopompic hallucinations.    Silviano has difficulty breathing through his nose.      Silviano has gained 0-5 pounds in the last year.  Patient describes themself as neither a morning or night person.      Silviano naps occasionally. He takes some inadvertant naps.  He denies falling asleep while driving during the day.  Patient was counseled on the importance of driving while alert, to pull over if drowsy, or nap before getting into the vehicle if sleepy.  He uses 1 cups/day of coffee.     Allergies:    Allergies   Allergen Reactions     Gramineae Pollens Other (See Comments)     Other reaction(s): Runny Nose     No Known Drug Allergies        Medications:    Current Outpatient Medications   Medication Sig Dispense Refill     azelastine-fluticasone (DYMISTA) 137-50 MCG/ACT nasal spray Spray into both nostrils 2 times daily       budesonide (PULMICORT) 0.5 MG/2ML neb solution Spray 2 mLs (0.5 mg) in nostril daily 60 mL 11     clotrimazole (LOTRIMIN) 1 % external cream Apply topically 2 times daily For 14 DAYS 30 g 0     fluticasone (FLONASE) 50 MCG/ACT nasal spray Spray 1 spray into both nostrils daily 16 g 3     fluticasone-salmeterol (ADVAIR) 100-50 MCG/DOSE inhaler Inhale 1 puff into the lungs every 12 hours 3 each 3     montelukast (SINGULAIR) 10 MG tablet Take 10 mg by mouth       MULTI-VITAMIN OR TABS        predniSONE (DELTASONE) 20 MG tablet Take 2 tablets (40 mg) by mouth daily 10 tablet 0     triamcinolone (KENALOG) 0.1 % external cream Apply topically 2 times daily For up to 14 days. 30 g 0     ZYRTEC 10 MG OR TABS 1 tab daily prn  30 10       Problem List:  Patient Active  Problem List    Diagnosis Date Noted     Controlled type 2 diabetes mellitus without complication, without long-term current use of insulin (H) 02/19/2020     Priority: Medium     Hyperlipidemia 09/15/2015     Priority: Medium     Obesity 09/15/2015     Priority: Medium     KRISTEL (obstructive sleep apnea) 09/15/2015     Priority: Medium     Sleep study done at [facility] on 8/17/2021 (228#)-AHI 39, RDI 83,  lowest oxygen saturation was 81%, CPAP 12cm/H20       Allergic rhinitis due to other allergen 11/30/2005     Priority: Medium     seasonal allergies (spring and fall)       Mild intermittent asthma 11/30/2005     Priority: Medium     exercise, seasonal, associated with URIs          Past Medical/Surgical History:  Past Medical History:   Diagnosis Date     Allergic rhinitis due to other allergen     seasonal allergies (spring and fall)     Mild intermittent asthma     exercise, seasonal, associated with URIs     Past Surgical History:   Procedure Laterality Date     HC TOOTH EXTRACTION W/FORCEP  1995    4 wisdom teeth removed withotu complications       Social History:  Social History     Socioeconomic History     Marital status:      Spouse name: Not on file     Number of children: Not on file     Years of education: Not on file     Highest education level: Not on file   Occupational History     Not on file   Tobacco Use     Smoking status: Former Smoker     Smokeless tobacco: Never Used     Tobacco comment: socially   Vaping Use     Vaping Use: Never used   Substance and Sexual Activity     Alcohol use: Yes     Comment: 2x month     Drug use: Not on file     Sexual activity: Not Currently   Other Topics Concern     Not on file   Social History Narrative     Not on file     Social Determinants of Health     Financial Resource Strain: Not on file   Food Insecurity: Not on file   Transportation Needs: Not on file   Physical Activity: Not on file   Stress: Not on file   Social Connections: Not on  "file   Intimate Partner Violence: Not on file   Housing Stability: Not on file       Family History:  Family History   Problem Relation Age of Onset     Diabetes Mother         D: 40 pneumonia,  Type I DM     Family History Negative Father      Family History Negative Brother        Review of Systems:  A complete review of systems reviewed by me is negative with the exeption of what has been mentioned in the history of present illness.  CONSTITUTIONAL: NEGATIVE for weight gain/loss, fever, chills, sweats or night sweats, drug allergies.  EYES: NEGATIVE for changes in vision, blind spots, double vision.  ENT: NEGATIVE for ear pain, sore throat, sinus pain, post-nasal drip, runny nose, bloody nose  CARDIAC: NEGATIVE for fast heartbeats or fluttering in chest, chest pain or pressure, breathlessness when lying flat, swollen legs or swollen feet.  NEUROLOGIC: NEGATIVE headaches, weakness or numbness in the arms or legs.  DERMATOLOGIC: NEGATIVE for rashes, new moles or change in mole(s)  PULMONARY: NEGATIVE SOB at rest, SOB with activity, dry cough, productive cough, coughing up blood, wheezing or whistling when breathing.    GASTROINTESTINAL: NEGATIVE for nausea or vomitting, loose or watery stools, fat or grease in stools, constipation, abdominal pain, bowel movements black in color or blood noted.  GENITOURINARY: NEGATIVE for pain during urination, blood in urine, urinating more frequently than usual, irregular menstrual periods.  MUSCULOSKELETAL: NEGATIVE for muscle pain, bone or joint pain, swollen joints.  ENDOCRINE: NEGATIVE for increased thirst or urination, diabetes.  LYMPHATIC: NEGATIVE for swollen lymph nodes, lumps or bumps in the breasts or nipple discharge.    Physical Examination:  Vitals: /84   Pulse 74   Ht 1.803 m (5' 11\")   Wt 101.6 kg (224 lb)   SpO2 97%   BMI 31.24 kg/m    BMI= Body mass index is 31.24 kg/m .    Neck Cir (cm): 43 cm    [place] Total Score 12/6/2021   Total score - [place] " 13       CINTHYA Total Score: 17 (12/06/21 1300)    Last Comprehensive Metabolic Panel:  No results found for: NA, POTASSIUM, CHLORIDE, CO2, ANIONGAP, GLC, BUN, CR, GFRESTIMATED, KE    Impression/Plan:    1. Severe obstructive sleep apnea-  Sub-optimal CPAP use.   He wants to transition to [facility] for supplies.   DME order signed.     2. Hypersomnia on CPAP-  He reports using a stimulant as needed. He does not know the name of the medication.   He was prescribed 30 tablets about 6 months ago and only uses it as needed when driving.   Patient counseled to use CPAP with all sleep.  Patient also counseled to obtain 8+ hours nightly.    Literature provided regarding sleep apnea.      He will follow up with me in approximately two weeks after his sleep study has been competed to review the results and discuss plan of care.       Old polysomnography reviewed.  Obstructive sleep apnea reviewed.  Complications of untreated sleep apnea were reviewed.    MARTINA Grullon

## 2021-12-06 NOTE — NURSING NOTE
"Chief Complaint   Patient presents with     CPAP Follow Up     Patient needs new CPAP supplies and possible new CPAP due to recall. Patient stopped using CPAP because he thought it was broken.        Initial There were no vitals taken for this visit. Estimated body mass index is 32.08 kg/m  as calculated from the following:    Height as of 11/19/21: 1.803 m (5' 11\").    Weight as of 11/19/21: 104.3 kg (230 lb).    Medication Reconciliation: complete    Neck circumference:  43 centimeters.    DME: Alf Ayala CMA on 12/6/2021 at 1:50 PM    "

## 2021-12-06 NOTE — NURSING NOTE
1 year appointment reminder card created and will be mailed when appropriate. Adeel Ayala, [facility]

## 2021-12-14 ENCOUNTER — TELEPHONE (OUTPATIENT)
Dept: FAMILY MEDICINE | Facility: CLINIC | Age: 46
End: 2021-12-14
Payer: COMMERCIAL

## 2021-12-14 DIAGNOSIS — R21 RASH: Primary | ICD-10-CM

## 2021-12-14 DIAGNOSIS — L65.9 ALOPECIA: ICD-10-CM

## 2021-12-14 NOTE — TELEPHONE ENCOUNTER
Patient is requesting a new RX for Fluocinonide 0.05% Ointment be sent to [facility] ([phone]). Not active on current med list.

## 2021-12-14 NOTE — TELEPHONE ENCOUNTER
Routing refill request to provider for review/approval because:  Drug not active on patient's medication list  Patient has not been prescribed this medication by any [facility] providers.    Regi Massey RN

## 2021-12-15 RX ORDER — FLUOCINONIDE 0.5 MG/G
CREAM TOPICAL 2 TIMES DAILY
Qty: 30 G | Refills: 0 | Status: SHIPPED | OUTPATIENT
Start: 2021-12-15 | End: 2021-12-23

## 2021-12-15 NOTE — TELEPHONE ENCOUNTER
Further refills will require a visit/ establish care visit to review medical conditions and medications patient is on. Refill provided today.   RYAN Downs CNP

## 2021-12-15 NOTE — TELEPHONE ENCOUNTER
What was this previously prescribed for? Is he using this on the rash on the foot I saw him for?  RYAN Downs CNP

## 2021-12-15 NOTE — TELEPHONE ENCOUNTER
Pt has changed clinics and is now coming to [facility]. Pt had VV with OTTONIEL Weinstein on 10/26/21.  Pt is using Lidex cream for alopecia?  This was last ordered by Mari Pelaez.    Refill?  Appt?  KPaShannan

## 2021-12-21 ENCOUNTER — VIRTUAL VISIT (OUTPATIENT)
Dept: DERMATOLOGY | Facility: CLINIC | Age: 46
End: 2021-12-21
Payer: COMMERCIAL

## 2021-12-21 DIAGNOSIS — L30.0 NUMMULAR DERMATITIS: Primary | ICD-10-CM

## 2021-12-21 PROCEDURE — 99203 OFFICE O/P NEW LOW 30 MIN: CPT | Mod: 95 | Performed by: DERMATOLOGY

## 2021-12-21 RX ORDER — BETAMETHASONE DIPROPIONATE 0.5 MG/G
CREAM TOPICAL
Qty: 100 G | Refills: 3 | Status: SHIPPED | OUTPATIENT
Start: 2021-12-21 | End: 2022-06-21

## 2021-12-21 NOTE — PROGRESS NOTES
"    Silviano Collins is a 46 year old male who is being evaluated via a phone  visit.      The patient has been notified of following:     \"This phone  visit will be conducted via a call between you and your physician/provider. We have found that certain health care needs can be provided without the need for an in-person physical exam.  This service lets us provide the care you need with a video conversation.  If a prescription is necessary we can send it directly to your pharmacy.  If lab work is needed we can place an order for that and you can then stop by our lab to have the test done at a later time.    Phone visits are billed at different rates depending on your insurance coverage.  Please reach out to your insurance provider with any questions.    If during the course of the call the physician/provider feels a phone visit is not appropriate, you will not be charged for this service.\"    Patient has given verbal consent for phone visit? Yes    How would you like to obtain your AVS? Jcarlos    Silviano Collins is a 46 year old year old male patient here today for itching rash on right foot.   .  Patient states this has been present for a while.  Patient reports the following symptoms:  itching.  Patient reports the following previous treatments clotrimazole and TAC.  .  These treatments did not work.  Patient reports the following modifying factors none.  Associated symptoms: none.  Patient has no other skin complaints today.  Remainder of the HPI, Meds, PMH, Allergies, FH, and SH was reviewed in chart.      Past Medical History:   Diagnosis Date     Allergic rhinitis due to other allergen     seasonal allergies (spring and fall)     Mild intermittent asthma     exercise, seasonal, associated with URIs       Past Surgical History:   Procedure Laterality Date     HC TOOTH EXTRACTION W/FORCEP  1995    4 wisdom teeth removed withotu complications        Family History   Problem Relation Age of Onset     " Diabetes Mother         D: 40 pneumonia,  Type I DM     Family History Negative Father      Family History Negative Brother        Social History     Socioeconomic History     Marital status:      Spouse name: Not on file     Number of children: Not on file     Years of education: Not on file     Highest education level: Not on file   Occupational History     Not on file   Tobacco Use     Smoking status: Former Smoker     Smokeless tobacco: Never Used     Tobacco comment: socially   Vaping Use     Vaping Use: Never used   Substance and Sexual Activity     Alcohol use: Yes     Comment: 2x month     Drug use: Not on file     Sexual activity: Not Currently   Other Topics Concern     Not on file   Social History Narrative     Not on file     Social Determinants of Health     Financial Resource Strain: Not on file   Food Insecurity: Not on file   Transportation Needs: Not on file   Physical Activity: Not on file   Stress: Not on file   Social Connections: Not on file   Intimate Partner Violence: Not on file   Housing Stability: Not on file       Outpatient Encounter Medications as of 12/21/2021   Medication Sig Dispense Refill     azelastine-fluticasone (DYMISTA) 137-50 MCG/ACT nasal spray Spray into both nostrils 2 times daily       budesonide (PULMICORT) 0.5 MG/2ML neb solution Spray 2 mLs (0.5 mg) in nostril daily 60 mL 11     clotrimazole (LOTRIMIN) 1 % external cream Apply topically 2 times daily For 14 DAYS 30 g 0     fluocinonide (LIDEX) 0.05 % external cream Apply topically 2 times daily 30 g 0     fluticasone (FLONASE) 50 MCG/ACT nasal spray Spray 1 spray into both nostrils daily 16 g 3     fluticasone-salmeterol (ADVAIR) 100-50 MCG/DOSE inhaler Inhale 1 puff into the lungs every 12 hours 3 each 3     montelukast (SINGULAIR) 10 MG tablet Take 10 mg by mouth       MULTI-VITAMIN OR TABS        predniSONE (DELTASONE) 20 MG tablet Take 2 tablets (40 mg) by mouth daily 10 tablet 0     triamcinolone (KENALOG)  0.1 % external cream Apply topically 2 times daily For up to 14 days. 30 g 0     ZYRTEC 10 MG OR TABS 1 tab daily prn  30 10     No facility-administered encounter medications on file as of 12/21/2021.             Review Of Systems  Skin: As above  Eyes: negative  Ears/Nose/Throat: negative  Respiratory: No shortness of breath, dyspnea on exertion, cough, or hemoptysis  Cardiovascular: negative  Gastrointestinal: negative  Genitourinary: negative  Musculoskeletal: negative  Neurologic: negative  Psychiatric: negative  Hematologic/Lymphatic/Immunologic: negative  Endocrine: negative      O:   Alert & Orientedx3, Mood & Affect wnl,    General appearance normal   Alert, oriented and in no acute distress     Photos:on wifes phone red eczematyous nummer plaque dorsal foot      Pulm: Breathing Normal, talking in normal sentences, no shortness of breath during conversation    Neuro/Psych: Orientation:Alert and Orientedx3 ; Mood/Affect:normal ; no anxiety or depression     A/P:  1.Favor nummular derm   Betamethasone twice daily  Skin care discussed with patient   Return to clinic 2 weeks    It was a pleasure speaking to Silviano Collins today.  Previous clinic  notes and pertinent laboratory tests were reviewed prior to Silviano Collins's visit.  Skin care regimen reviewed with patient: Eliminate harsh soaps, i.e. Dial, zest, irsih spring; Mild soaps such as Cetaphil or Dove sensitive skin, avoid hot or cold showers, aggressive use of emollients including vanicream, cetaphil or cerave discussed with patient.    Teledermatology information:  - Location of patient: home  - Location of teledermatologist: [facility] [place] [place]   - Reason teledermatology is appropriate: of National Emergency Regarding Coronavirus disease (COVID 19) Outbreak  - The patient's condition can safely be assessed using telemedicine: yes  - Method of transmission: store and forward teledermatology  - In-person dermatology visit recommendation: no  - Service start  time:800am/pm  - Service end time:815am/pm  Photos good  - Date of report: 12/21/21

## 2021-12-21 NOTE — LETTER
"    12/21/2021         RE: Silviano Collins  [address] [facility] [place] [address]        Dear Colleague,    Thank you for referring your patient, Silviano Collins, to the [facility]. Please see a copy of my visit note below.        Silviano Collins is a 46 year old male who is being evaluated via a phone  visit.      The patient has been notified of following:     \"This phone  visit will be conducted via a call between you and your physician/provider. We have found that certain health care needs can be provided without the need for an in-person physical exam.  This service lets us provide the care you need with a video conversation.  If a prescription is necessary we can send it directly to your pharmacy.  If lab work is needed we can place an order for that and you can then stop by our lab to have the test done at a later time.    Phone visits are billed at different rates depending on your insurance coverage.  Please reach out to your insurance provider with any questions.    If during the course of the call the physician/provider feels a phone visit is not appropriate, you will not be charged for this service.\"    Patient has given verbal consent for phone visit? Yes    How would you like to obtain your AVS? Jcarlos    Silviano Collins is a 46 year old year old male patient here today for itching rash on right foot.   .  Patient states this has been present for a while.  Patient reports the following symptoms:  itching.  Patient reports the following previous treatments clotrimazole and TAC.  .  These treatments did not work.  Patient reports the following modifying factors none.  Associated symptoms: none.  Patient has no other skin complaints today.  Remainder of the HPI, Meds, PMH, Allergies, FH, and SH was reviewed in chart.      Past Medical History:   Diagnosis Date     Allergic rhinitis due to other allergen     seasonal allergies (spring and fall)     Mild " intermittent asthma     exercise, seasonal, associated with URIs       Past Surgical History:   Procedure Laterality Date     HC TOOTH EXTRACTION W/FORCEP  1995    4 wisdom teeth removed withotu complications        Family History   Problem Relation Age of Onset     Diabetes Mother         D: 40 pneumonia,  Type I DM     Family History Negative Father      Family History Negative Brother        Social History     Socioeconomic History     Marital status:      Spouse name: Not on file     Number of children: Not on file     Years of education: Not on file     Highest education level: Not on file   Occupational History     Not on file   Tobacco Use     Smoking status: Former Smoker     Smokeless tobacco: Never Used     Tobacco comment: socially   Vaping Use     Vaping Use: Never used   Substance and Sexual Activity     Alcohol use: Yes     Comment: 2x month     Drug use: Not on file     Sexual activity: Not Currently   Other Topics Concern     Not on file   Social History Narrative     Not on file     Social Determinants of Health     Financial Resource Strain: Not on file   Food Insecurity: Not on file   Transportation Needs: Not on file   Physical Activity: Not on file   Stress: Not on file   Social Connections: Not on file   Intimate Partner Violence: Not on file   Housing Stability: Not on file       Outpatient Encounter Medications as of 12/21/2021   Medication Sig Dispense Refill     azelastine-fluticasone (DYMISTA) 137-50 MCG/ACT nasal spray Spray into both nostrils 2 times daily       budesonide (PULMICORT) 0.5 MG/2ML neb solution Spray 2 mLs (0.5 mg) in nostril daily 60 mL 11     clotrimazole (LOTRIMIN) 1 % external cream Apply topically 2 times daily For 14 DAYS 30 g 0     fluocinonide (LIDEX) 0.05 % external cream Apply topically 2 times daily 30 g 0     fluticasone (FLONASE) 50 MCG/ACT nasal spray Spray 1 spray into both nostrils daily 16 g 3     fluticasone-salmeterol (ADVAIR) 100-50 MCG/DOSE  inhaler Inhale 1 puff into the lungs every 12 hours 3 each 3     montelukast (SINGULAIR) 10 MG tablet Take 10 mg by mouth       MULTI-VITAMIN OR TABS        predniSONE (DELTASONE) 20 MG tablet Take 2 tablets (40 mg) by mouth daily 10 tablet 0     triamcinolone (KENALOG) 0.1 % external cream Apply topically 2 times daily For up to 14 days. 30 g 0     ZYRTEC 10 MG OR TABS 1 tab daily prn  30 10     No facility-administered encounter medications on file as of 12/21/2021.             Review Of Systems  Skin: As above  Eyes: negative  Ears/Nose/Throat: negative  Respiratory: No shortness of breath, dyspnea on exertion, cough, or hemoptysis  Cardiovascular: negative  Gastrointestinal: negative  Genitourinary: negative  Musculoskeletal: negative  Neurologic: negative  Psychiatric: negative  Hematologic/Lymphatic/Immunologic: negative  Endocrine: negative      O:   Alert & Orientedx3, Mood & Affect wnl,    General appearance normal   Alert, oriented and in no acute distress     Photos:on wifes phone red eczematyous nummer plaque dorsal foot      Pulm: Breathing Normal, talking in normal sentences, no shortness of breath during conversation    Neuro/Psych: Orientation:Alert and Orientedx3 ; Mood/Affect:normal ; no anxiety or depression     A/P:  1.Favor nummular derm   Betamethasone twice daily  Skin care discussed with patient   Return to clinic 2 weeks    It was a pleasure speaking to Silviano Collins today.  Previous clinic  notes and pertinent laboratory tests were reviewed prior to Silviano Collins's visit.  Skin care regimen reviewed with patient: Eliminate harsh soaps, i.e. Dial, zest, irsih spring; Mild soaps such as Cetaphil or Dove sensitive skin, avoid hot or cold showers, aggressive use of emollients including vanicream, cetaphil or cerave discussed with patient.    Teledermatology information:  - Location of patient: home  - [facility] tel[facility]at[facility] [place]   - Reason teledermatology is appropriate: of  National Emergency Regarding Coronavirus disease (COVID 19) Outbreak  - The patient's condition can safely be assessed using telemedicine: yes  - Method of transmission: store and forward teledermatology  - In-person dermatology visit recommendation: no  - Service start time:800am/pm  - Service end time:815am/pm  Photos good  - Date of report: 12/21/21        Again, thank you for allowing me to participate in the care of your patient.        Sincerely,        Chaim Samuel MD

## 2021-12-23 ENCOUNTER — OFFICE VISIT (OUTPATIENT)
Dept: FAMILY MEDICINE | Facility: CLINIC | Age: 46
End: 2021-12-23
Payer: COMMERCIAL

## 2021-12-23 VITALS
WEIGHT: 229.9 LBS | HEIGHT: 71 IN | HEART RATE: 84 BPM | SYSTOLIC BLOOD PRESSURE: 128 MMHG | OXYGEN SATURATION: 97 % | DIASTOLIC BLOOD PRESSURE: 92 MMHG | BODY MASS INDEX: 32.19 KG/M2

## 2021-12-23 DIAGNOSIS — R09.81 CONGESTION OF PARANASAL SINUS: Primary | ICD-10-CM

## 2021-12-23 DIAGNOSIS — Z23 INFLUENZA VACCINE NEEDED: ICD-10-CM

## 2021-12-23 DIAGNOSIS — R93.89 ABNORMAL CHEST X-RAY: ICD-10-CM

## 2021-12-23 DIAGNOSIS — L65.9 ALOPECIA: ICD-10-CM

## 2021-12-23 PROCEDURE — 99213 OFFICE O/P EST LOW 20 MIN: CPT | Mod: 25 | Performed by: NURSE PRACTITIONER

## 2021-12-23 PROCEDURE — 90471 IMMUNIZATION ADMIN: CPT | Performed by: NURSE PRACTITIONER

## 2021-12-23 PROCEDURE — 90686 IIV4 VACC NO PRSV 0.5 ML IM: CPT | Performed by: NURSE PRACTITIONER

## 2021-12-23 RX ORDER — FLUOCINONIDE TOPICAL SOLUTION USP, 0.05% 0.5 MG/ML
SOLUTION TOPICAL 2 TIMES DAILY
Qty: 60 ML | Refills: 1 | Status: SHIPPED | OUTPATIENT
Start: 2021-12-23 | End: 2022-06-21

## 2021-12-23 ASSESSMENT — MIFFLIN-ST. JEOR: SCORE: 1944.95

## 2021-12-23 NOTE — PATIENT INSTRUCTIONS
Refills of the sinus medication and alopecia medication sent in.    Flu shot today.    Chest x-ray recheck today.  Results to come through on [employer].  If still abnormal, we can check a CT scan

## 2021-12-23 NOTE — PROGRESS NOTES
"  Assessment & Plan     Alopecia  Gets great results from the fluocinonide solution.  Needs a refill  - fluocinonide (LIDEX) 0.05 % external solution; Apply topically 2 times daily    Congestion of paranasal sinus  Chronic sinusitis, historically treated with Claritin-D  - loratadine-pseudoePHEDrine (CLARITIN-D 24-HOUR)  MG 24 hr tablet; Take 1 tablet by mouth daily    Abnormal chest x-ray  Had a cough with fever this past November.  Negative Covid test.  Was there was a possible pneumonia on his x-ray images.  I reviewed the radiologist dictation-\" fibronodular infiltrate in the right middle lobe.\"  He wants to recheck a chest x-ray.  If still abnormal, I can order a CT scan for him  - XR Chest 2 Views; Future    Influenza vaccine needed  - INFLUENZA VACCINE IM >6 MO VALENT IIV4 (ALFURIA/FLUZONE)    Review of external notes as documented elsewhere in note  16 minutes spent on the date of the encounter doing chart review, history and exam, documentation and further activities per the note       See Patient Instructions    Return in about 6 months (around 6/23/2022) for Follow up.    Silviano Nolasco, [facility]    Jose Guadalupe Sow is a 46 year old who presents for the following health issues     HPI     Patient comes to clinic today for follow-up.  He was seen in the ED about a month ago for cough/fever.  Covid testing was negative at that time.  He had a chest x-ray done.  He was told that the radiologist interpreted his x-ray as a possible pneumonia.  He was discharged on Augmentin and prednisone.    He has alopecia.  He uses fluocinonide for this and it works really well.  Looking for refill today      Review of Systems   Constitutional, HEENT, cardiovascular, pulmonary, gi and gu systems are negative, except as otherwise noted.      Objective    BP (!) 128/92 (BP Location: Right arm, Patient Position: Sitting, Cuff Size: Adult Large)   Pulse 84   Ht 1.803 m (5' 11\") "   Wt 104.3 kg (229 lb 14.4 oz)   SpO2 97%   BMI 32.06 kg/m    Body mass index is 32.06 kg/m .  Physical Exam     Patient is alert, oriented, and in no acute distress

## 2022-01-08 ENCOUNTER — HEALTH MAINTENANCE LETTER (OUTPATIENT)
Age: 47
End: 2022-01-08

## 2022-02-21 DIAGNOSIS — G47.33 OBSTRUCTIVE SLEEP APNEA (ADULT) (PEDIATRIC): Primary | ICD-10-CM

## 2022-02-21 NOTE — PROGRESS NOTES
Patient came to [place]  for mask fitting walk in appointment on February 21, 2022. Patient requested to switch masks because poor seal/leak. Discussed the following masks: [employer] Vitera Full Face; Respironics Dreamwear Nasal; Resmed Airfit N30i Nasal; Resmed Airfit & Airtouch N20 Nasal; Resmed Airfit P10 pillow.  Patient selected a Resmed Mask name: N20 Nasal mask size Large

## 2022-02-21 NOTE — Clinical Note
A mask clarification is needed. Patient has chosen a nasal mask. Please sign clarification. Thank you!

## 2022-04-04 ENCOUNTER — TELEPHONE (OUTPATIENT)
Dept: OTOLARYNGOLOGY | Facility: CLINIC | Age: 47
End: 2022-04-04
Payer: COMMERCIAL

## 2022-04-04 DIAGNOSIS — J31.0 CHRONIC RHINITIS: ICD-10-CM

## 2022-04-04 DIAGNOSIS — J33.9 NASAL POLYPOSIS: ICD-10-CM

## 2022-04-04 RX ORDER — BUDESONIDE 0.5 MG/2ML
INHALANT ORAL
Qty: 60 ML | Refills: 11 | Status: SHIPPED | OUTPATIENT
Start: 2022-04-04 | End: 2024-03-06 | Stop reason: ALTCHOICE

## 2022-04-04 NOTE — TELEPHONE ENCOUNTER
Called patient, with the changing of the seasons he is having a flare up again with swelling under his eyes and cheeks. Patient saw Dr. Srivastava 9/2021 and was prescribed budesonide with navneet med saline irrigations. Patient said he just needs more of the budesonide. Medication pended with pharmacy patient would like to use. Forwarding to provider to approve.     Kari SNOW RN, [facility] 04/04/22 11:40 AM

## 2022-04-28 ENCOUNTER — OFFICE VISIT (OUTPATIENT)
Dept: INTERNAL MEDICINE | Facility: CLINIC | Age: 47
End: 2022-04-28
Payer: COMMERCIAL

## 2022-04-28 VITALS
BODY MASS INDEX: 32.09 KG/M2 | SYSTOLIC BLOOD PRESSURE: 147 MMHG | HEART RATE: 81 BPM | WEIGHT: 230.1 LBS | OXYGEN SATURATION: 99 % | DIASTOLIC BLOOD PRESSURE: 98 MMHG

## 2022-04-28 DIAGNOSIS — N52.9 ERECTILE DYSFUNCTION, UNSPECIFIED ERECTILE DYSFUNCTION TYPE: Primary | ICD-10-CM

## 2022-04-28 DIAGNOSIS — B00.1 COLD SORE: ICD-10-CM

## 2022-04-28 DIAGNOSIS — Z12.11 SPECIAL SCREENING FOR MALIGNANT NEOPLASMS, COLON: ICD-10-CM

## 2022-04-28 DIAGNOSIS — J45.20 MILD INTERMITTENT ASTHMA, UNSPECIFIED WHETHER COMPLICATED: ICD-10-CM

## 2022-04-28 PROCEDURE — 99213 OFFICE O/P EST LOW 20 MIN: CPT | Performed by: NURSE PRACTITIONER

## 2022-04-28 RX ORDER — VALACYCLOVIR HYDROCHLORIDE 500 MG/1
500 TABLET, FILM COATED ORAL 2 TIMES DAILY
Qty: 30 TABLET | Refills: 0 | Status: SHIPPED | OUTPATIENT
Start: 2022-04-28 | End: 2022-11-05

## 2022-04-28 RX ORDER — MONTELUKAST SODIUM 10 MG/1
10 TABLET ORAL AT BEDTIME
Qty: 90 TABLET | Refills: 1 | Status: SHIPPED | OUTPATIENT
Start: 2022-04-28 | End: 2022-11-08

## 2022-04-28 RX ORDER — FLUTICASONE PROPIONATE 50 MCG
1 SPRAY, SUSPENSION (ML) NASAL DAILY
Qty: 16 G | Refills: 3 | Status: SHIPPED | OUTPATIENT
Start: 2022-04-28 | End: 2022-06-21

## 2022-04-28 RX ORDER — VALACYCLOVIR HYDROCHLORIDE 500 MG/1
TABLET, FILM COATED ORAL
COMMUNITY
Start: 2022-03-03 | End: 2022-04-28

## 2022-04-28 RX ORDER — SILDENAFIL CITRATE 20 MG/1
20 TABLET ORAL 3 TIMES DAILY
Qty: 30 TABLET | Refills: 0 | Status: SHIPPED | OUTPATIENT
Start: 2022-04-28 | End: 2022-06-21

## 2022-04-28 NOTE — PATIENT INSTRUCTIONS
"\"Lab only\" appointment next week in the morning to check testosterone.    Paper prescription for sildenafil provided.  Use 3 tablets (60 mg) 30 to 60 minutes before sexual intercourse.    Call [facility] at [phone] to schedule a colonoscopy.    Your blood pressure is mildly elevated today.  You need to set up a \"establish care\" appointment with new PCP in the near future and have your blood pressure rechecked.    Refills of Valtrex, montelukast, and Valtrex provided today.  "

## 2022-04-28 NOTE — PROGRESS NOTES
"  Assessment & Plan     Erectile dysfunction, unspecified erectile dysfunction type  He wants to try sildenafil for some mild erectile dysfunction symptoms.  I explained that he should use 60 mg 30 to 60 minutes before sexual activity.    He also wants his testosterone levels checked  - sildenafil (REVATIO) 20 MG tablet; Take 1 tablet (20 mg) by mouth 3 times daily  - Testosterone Free and Total; Future    Mild intermittent asthma, unspecified whether complicated  He gets good results from the Singulair.  - fluticasone (FLONASE) 50 MCG/ACT nasal spray; Spray 1 spray into both nostrils daily  - montelukast (SINGULAIR) 10 MG tablet; Take 1 tablet (10 mg) by mouth At Bedtime    Cold sore  Intermittent cold sore treatment with Valtrex  - valACYclovir (VALTREX) 500 MG tablet; Take 1 tablet (500 mg) by mouth 2 times daily    Special screening for malignant neoplasms, colon  Due for screening colonoscopy  - Adult Gastro Ref - Procedure Only; Future    Patient Instructions   \"Lab only\" appointment next week in the morning to check testosterone.    Paper prescription for sildenafil provided.  Use 3 tablets (60 mg) 30 to 60 minutes before sexual intercourse.    Call [facility] at [phone] to schedule a colonoscopy.    Your blood pressure is mildly elevated today.  You need to set up a \"establish care\" appointment with new PCP in the near future and have your blood pressure rechecked.    Refills of Valtrex, montelukast, and Valtrex provided today.      Prescription drug management  16 minutes spent on the date of the encounter doing chart review, history and exam, documentation and further activities per the note     See Patient Instructions    No follow-ups on file.    Silviano Nolasco, [facility]    Jose Guadalupe Sow is a 46 year old who presents for the following health issues  HPI     Here because he has had some mild erectile dysfunction symptoms lately and his " coordination turned about a possible low testosterone issue.    He had his testosterone levels checked in 2018.  Those levels were around 315.  He understands the these results were technically within normal limits but he is concerned that his testosterone levels may have dipped a bit lower recently.    At times, will have difficulty achieving an erection.  Other times, he will experience premature ejaculation.    Understands that he has been more stressed out at work lately and this could be affecting his sexual performance.      Review of Systems   Constitutional, HEENT, cardiovascular, pulmonary, gi and gu systems are negative, except as otherwise noted.      Objective    BP (!) 143/95 (BP Location: Right arm, Patient Position: Sitting, Cuff Size: Adult Large)   Pulse 81   Wt 104.4 kg (230 lb 1.6 oz)   SpO2 99%   BMI 32.09 kg/m    Body mass index is 32.09 kg/m .  Physical Exam     Healthy-appearing adult male

## 2022-04-30 ENCOUNTER — HEALTH MAINTENANCE LETTER (OUTPATIENT)
Age: 47
End: 2022-04-30

## 2022-05-07 NOTE — PROGRESS NOTES
Chief Complaint   Patient presents with     Sinus Problem     Consult sinus/hx of septoplasty about 6 years ago/still using CPAP machine/mouth breather and is not happy with the results of surgery.  Sleep study done several years ago. Left side puffiness on face. Uses saline & budesonide rinses.  Has looked into [facility] but not sure this is   The way to go.     History of Present Illness   Silviaon Collins is a 46 year old male who presents for nose and sinus evaluation. The patient has a long history of sinus, allergy, and asthma issues.  About a decade ago, he underwent allergy testing which showed multiple positives.  They had discussed the possibility of allergy immunotherapy but this was to time intensive at the time for the patient.  He also has a history of asthma and does take Advair twice daily.  Despite his Advair, there are multiple times a week where he will need to use his albuterol inhaler, sometimes 3 times a day.  He has not seen an asthma specialist, allergist, or pulmonologist in quite some time.  His current asthma regimen is Advair twice daily and his albuterol rescue inhaler.  He had seen Dr. Srivastava previously and was placed on budesonide irrigations for his nasal symptoms with some benefit.  He is doing the budesonide irrigations currently once daily.  He takes Claritin-D once daily, sometimes alternating regular Claritin and pseudoephedrine.  He takes Singulair at bedtime.      The patient reports bilateral nasal congestion but feels like he breathes okay through his nose since having surgery previously.  He does have some intermittent rhinorrhea but denies any significant postnasal drainage.  He denies any taste or smell problems.  He does have some facial pressure and congestion from time to time.  He had 1 previous septoplasty and sinus surgery in 2017.  He last underwent CT [facility] at [facility] in January 2020.  I do not have the images for review.  Patient denies any aspirin or nonsteroidal  sensitivity.    The patient also has a history of obstructive sleep apnea and does use CPAP every night.  He does not usually place distilled water in his CPAP.  He alternates between a nasal mask and a full facemask.    Past Medical History  Patient Active Problem List   Diagnosis     Allergic rhinitis due to other allergen     Mild intermittent asthma     Controlled type 2 diabetes mellitus without complication, without long-term current use of insulin (H)     Hyperlipidemia     Obesity     KRISTEL (obstructive sleep apnea)     Current Medications     Current Outpatient Medications:      albuterol (PROAIR HFA/PROVENTIL HFA/VENTOLIN HFA) 108 (90 Base) MCG/ACT inhaler, Inhale 2 puffs into the lungs every 6 hours, Disp: 36 g, Rfl: 3     azelastine-fluticasone (DYMISTA) 137-50 MCG/ACT nasal spray, Spray into both nostrils 2 times daily, Disp: , Rfl:      budesonide (PULMICORT) 0.5 MG/2ML neb solution, Place 0.5 mg/2 mL budesonide vial in 8 oz normal saline sinus rinse bottle.  Irrigate each nostril with one half of the bottle twice daily., Disp: 360 mL, Rfl: 3     budesonide (PULMICORT) 0.5 MG/2ML neb solution, Add one ampule into [facility] sinus rinse bottle with saline mixture, and irrigate daily, Disp: 60 mL, Rfl: 11     fluocinonide (LIDEX) 0.05 % external solution, Apply topically 2 times daily, Disp: 60 mL, Rfl: 1     fluticasone (FLONASE) 50 MCG/ACT nasal spray, Spray 1 spray into both nostrils daily, Disp: 16 g, Rfl: 3     fluticasone-salmeterol (ADVAIR) 100-50 MCG/DOSE inhaler, Inhale 1 puff into the lungs every 12 hours, Disp: 3 each, Rfl: 3     loratadine-pseudoePHEDrine (CLARITIN-D 24-HOUR)  MG 24 hr tablet, Take 1 tablet by mouth daily, Disp: 30 tablet, Rfl: 1     montelukast (SINGULAIR) 10 MG tablet, Take 1 tablet (10 mg) by mouth At Bedtime, Disp: 90 tablet, Rfl: 1     MULTI-VITAMIN OR TABS, , Disp: , Rfl:      sildenafil (REVATIO) 20 MG tablet, Take 1 tablet (20 mg) by mouth 3 times daily, Disp: 30  "tablet, Rfl: 0     valACYclovir (VALTREX) 500 MG tablet, Take 1 tablet (500 mg) by mouth 2 times daily, Disp: 30 tablet, Rfl: 0     ZYRTEC 10 MG OR TABS, 1 tab daily prn , Disp: 30, Rfl: 10     augmented betamethasone dipropionate (DIPROLENE-AF) 0.05 % external cream, Apply sparingly to affected area twice daily as needed.  Do not apply to face. (Patient not taking: Reported on 5/11/2022), Disp: 100 g, Rfl: 3  No current facility-administered medications for this visit.    Allergies  Allergies   Allergen Reactions     Gramineae Pollens Other (See Comments)     Other reaction(s): Runny Nose       Social History   Social History     Socioeconomic History     Marital status:    Tobacco Use     Smoking status: Former Smoker     Smokeless tobacco: Never Used   Vaping Use     Vaping Use: Never used   Substance and Sexual Activity     Alcohol use: Yes     Comment: 2x month     Sexual activity: Not Currently       Family History  Family History   Problem Relation Age of Onset     Diabetes Mother         D: 40 pneumonia,  Type I DM     Family History Negative Father      Family History Negative Brother        Review of Systems  As per HPI and PMHx, otherwise 10+ comprehensive system review is negative.    Physical Exam  BP (!) 143/88   Pulse 89   Temp 97.9  F (36.6  C) (Tympanic)   Ht 1.803 m (5' 11\")   Wt 104.3 kg (230 lb)   BMI 32.08 kg/m    GENERAL: Patient is a pleasant, cooperative 46 year old male in no acute distress.  HEAD: Normocephalic, atraumatic.  Hair and scalp are normal.  EYES: Pupils are equal, round, reactive to light and accommodation.  Extraocular movements are intact.  The sclera nonicteric without injection.  The patient does have obvious prominent edema of the lower eyelids greater on the left-hand side.  The extraocular structures are otherwise normal.   EARS: Normal shape and symmetry.  No tenderness when palpating the mastoid or tragal areas bilaterally.  Otoscopic exam reveals a minimal " amount of cerumen bilaterally.  The bilateral tympanic membranes are round, intact without evidence of effusion, good landmarks.  No retraction, granulation, or drainage.  NOSE: Nares are patent.  Nasal mucosa is boggy and inflamed with sticky, inflammatory mucus.  The patient has a midline nasal septum.  The patient has severe/marked inferior turban hypertrophy bilaterally left greater than right.  No nasal cavity masses, polyps, or mucopurulence on anterior rhinoscopy.  NEUROLOGIC: Cranial nerves II through XII are grossly intact.  Voice is strong.  Patient is House-Brackmann I/VI bilaterally.  CARDIOVASCULAR: Extremities are warm and well-perfused.  No significant peripheral edema.  RESPIRATORY: Patient has nonlabored breathing without cough, wheeze, stridor.  PSYCHIATRIC: Patient is alert and oriented.  Mood and affect appear normal.  SKIN: Warm and dry.  No scalp, face, or neck lesions noted.    Procedure: Flexible Nasal Endoscopy  Indication: Nasal congestion, chronic sinusitis, history of sinus surgery and septoplasty    To best visualize the sinonasal anatomy and due to the chief complaint and HPI, I proceeded with flexible fiberoptic nasal endoscopy.  The bilateral nasal cavities were anesthetized and decongested with a mixture of lidocaine and neosynephrine.  The bilateral nasal cavities were then examined using flexible fiberoptic nasal endoscope.  The right nasal cavity was without masses, polyps, or mucopurulence.  The right middle turbinate and middle meatus was normal in appearance.  The sphenoethmoid recess was clear.  The left nasal cavity was without masses, polyps, or mucopurulence.  The left middle turbinate and middle meatus was normal in appearance.  The sphenoethmoid recess was clear.  The sinonasal mucosa appeared boggy and inflamed with sticky, inflammatory mucus.  The nasal septum is essentially midline.  The nasopharynx had a normal appearance with normal Eustachian tube openings and fossa  of Rosenmuller bilaterally.  Minimal adenoid tissue.  The scope was removed.  The patient tolerated the procedure well.    Assessment and Plan     ICD-10-CM    1. Chronic sinusitis, unspecified location  J32.9 triamcinolone (KENALOG-40) injection 80 mg     budesonide (PULMICORT) 0.5 MG/2ML neb solution     albuterol (PROAIR HFA/PROVENTIL HFA/VENTOLIN HFA) 108 (90 Base) MCG/ACT inhaler     Adult Allergy/Asthma Referral     NASAL ENDOSCOPY, DIAGNOSTIC   2. History of endoscopic sinus surgery  Z98.890 triamcinolone (KENALOG-40) injection 80 mg     budesonide (PULMICORT) 0.5 MG/2ML neb solution     albuterol (PROAIR HFA/PROVENTIL HFA/VENTOLIN HFA) 108 (90 Base) MCG/ACT inhaler     Adult Allergy/Asthma Referral     NASAL ENDOSCOPY, DIAGNOSTIC   3. History of nasal septoplasty  Z98.890 triamcinolone (KENALOG-40) injection 80 mg     budesonide (PULMICORT) 0.5 MG/2ML neb solution     albuterol (PROAIR HFA/PROVENTIL HFA/VENTOLIN HFA) 108 (90 Base) MCG/ACT inhaler     Adult Allergy/Asthma Referral     NASAL ENDOSCOPY, DIAGNOSTIC   4. History of allergic rhinitis  Z87.09 triamcinolone (KENALOG-40) injection 80 mg     budesonide (PULMICORT) 0.5 MG/2ML neb solution     albuterol (PROAIR HFA/PROVENTIL HFA/VENTOLIN HFA) 108 (90 Base) MCG/ACT inhaler     Adult Allergy/Asthma Referral     NASAL ENDOSCOPY, DIAGNOSTIC   5. Severe persistent asthma without complication  J45.50 triamcinolone (KENALOG-40) injection 80 mg     budesonide (PULMICORT) 0.5 MG/2ML neb solution     albuterol (PROAIR HFA/PROVENTIL HFA/VENTOLIN HFA) 108 (90 Base) MCG/ACT inhaler     Adult Allergy/Asthma Referral     NASAL ENDOSCOPY, DIAGNOSTIC     It was my pleasure seeing Silviano MARV Collins today in clinic.  The patient presents with chronic sinusitis without nasal polyposis.    We discussed the pathophysiology of chronic sinusitis.  We discussed medical and surgical management.  I feel the patient would benefit from nasal saline irrigations with Budesonide.   We discussed proper nasal saline irrigation technique with Budesonide.  The patient would also benefit from an 80 mg intramuscular injection of Kenalog injection today in office.  We discussed the risks, benefits, options, goals of a intramuscular Kenalog injection today in office including, but not limited to: Risk of pain at injection site, risk of infection, side effects of systemic steroids including blood pressure problems, insulin resistance, weight gain, bone/joint issues, mood alteration.  Patient voiced understanding and is willing to proceed.    Given the patient's history, we will also pursue allergy evaluation help both with allergy management and asthma management.    I will attempt to obtain the patient's outside CT imaging for review.  I would like to follow-up on his symptoms in about 4 to 6 weeks.  If he showing significant improvement, we will continue medical management.  If he is not improving or worsening, at some point we should obtain a CT scan for comparison to see if he would be a candidate for surgical intervention to help his paranasal sinus disease.    The patient will return in 8-12 weeks for follow-up.  I will contact the patient 1-2 weeks prior to the return appointment to recheck their symptoms.  If symptoms are not improved, we will obtain a sinus CT and review the imaging at the return appointment.    We discussed the importance of using distilled water in his CPAP to help with his nasal congestion and his nasal hydration.    Juanjose to follow up with Primary Care provider regarding elevated blood pressure.    Brando Khan MD  Department of Otolaryngology-Head and Neck Surgery  [facility]

## 2022-05-11 ENCOUNTER — OFFICE VISIT (OUTPATIENT)
Dept: OTOLARYNGOLOGY | Facility: CLINIC | Age: 47
End: 2022-05-11
Payer: COMMERCIAL

## 2022-05-11 VITALS
WEIGHT: 230 LBS | DIASTOLIC BLOOD PRESSURE: 88 MMHG | SYSTOLIC BLOOD PRESSURE: 143 MMHG | HEART RATE: 89 BPM | BODY MASS INDEX: 32.2 KG/M2 | TEMPERATURE: 97.9 F | HEIGHT: 71 IN

## 2022-05-11 DIAGNOSIS — Z98.890 HISTORY OF NASAL SEPTOPLASTY: ICD-10-CM

## 2022-05-11 DIAGNOSIS — J32.9 CHRONIC SINUSITIS, UNSPECIFIED LOCATION: Primary | ICD-10-CM

## 2022-05-11 DIAGNOSIS — Z87.09 HISTORY OF ALLERGIC RHINITIS: ICD-10-CM

## 2022-05-11 DIAGNOSIS — Z98.890 HISTORY OF ENDOSCOPIC SINUS SURGERY: ICD-10-CM

## 2022-05-11 DIAGNOSIS — J45.50 SEVERE PERSISTENT ASTHMA WITHOUT COMPLICATION (H): ICD-10-CM

## 2022-05-11 PROCEDURE — 99214 OFFICE O/P EST MOD 30 MIN: CPT | Mod: 25 | Performed by: OTOLARYNGOLOGY

## 2022-05-11 PROCEDURE — 31231 NASAL ENDOSCOPY DX: CPT | Performed by: OTOLARYNGOLOGY

## 2022-05-11 RX ORDER — ALBUTEROL SULFATE 90 UG/1
2 AEROSOL, METERED RESPIRATORY (INHALATION) EVERY 6 HOURS
Qty: 36 G | Refills: 3 | Status: SHIPPED | OUTPATIENT
Start: 2022-05-11 | End: 2024-03-06

## 2022-05-11 RX ORDER — BUDESONIDE 0.5 MG/2ML
INHALANT ORAL
Qty: 360 ML | Refills: 3 | Status: SHIPPED | OUTPATIENT
Start: 2022-05-11 | End: 2022-06-21

## 2022-05-11 RX ORDER — TRIAMCINOLONE ACETONIDE 40 MG/ML
80 INJECTION, SUSPENSION INTRA-ARTICULAR; INTRAMUSCULAR ONCE
Status: COMPLETED | OUTPATIENT
Start: 2022-05-11 | End: 2022-05-11

## 2022-05-11 RX ADMIN — TRIAMCINOLONE ACETONIDE 80 MG: 40 INJECTION, SUSPENSION INTRA-ARTICULAR; INTRAMUSCULAR at 14:57

## 2022-05-11 NOTE — PATIENT INSTRUCTIONS
Per physician instructions      If you have questions or concerns on any instructions given to you by your provider today or if you need to schedule an appointment, you can reach us at [phone].

## 2022-05-11 NOTE — PROGRESS NOTES
Clinic Administered Medication Documentation    Administrations This Visit     triamcinolone (KENALOG-40) injection 80 mg     Admin Date  05/11/2022 Action  Given Dose  80 mg Route  Intramuscular Site  Right Ventrogluteal Administered By  Rochelle Rodas CMA    Ordering Provider: Brando Khan MD    Patient Supplied?: No                  Injectable Medication Documentation    Patient was given Triamcinolone acetonide. Prior to medication administration, verified patients identity using patient s name and date of birth. Please see MAR and medication order for additional information. Patient instructed to report any adverse reaction to staff immediately .      Was entire vial of medication used? Yes  Vial/Syringe: Single dose vial x2  Expiration Date:  10/2023  Was this medication supplied by the patient? No   Rochelle Rodas CMA

## 2022-05-11 NOTE — LETTER
5/11/2022         RE: Silviano Collins  [address] [facility] [address]        Dear Colleague,    Thank you for referring your patient, Silviano Collins, to the [facility]. Please see a copy of my visit note below.    Chief Complaint   Patient presents with     Sinus Problem     Consult sinus/hx of septoplasty about 6 years ago/still using CPAP machine/mouth breather and is not happy with the results of surgery.  Sleep study done several years ago. Left side puffiness on face. Uses saline & budesonide rinses.  Has looked into [facility] but not sure this is   The way to go.     History of Present Illness   Silviano Collins is a 46 year old male who presents for nose and sinus evaluation. The patient has a long history of sinus, allergy, and asthma issues.  About a decade ago, he underwent allergy testing which showed multiple positives.  They had discussed the possibility of allergy immunotherapy but this was to time intensive at the time for the patient.  He also has a history of asthma and does take Advair twice daily.  Despite his Advair, there are multiple times a week where he will need to use his albuterol inhaler, sometimes 3 times a day.  He has not seen an asthma specialist, allergist, or pulmonologist in quite some time.  His current asthma regimen is Advair twice daily and his albuterol rescue inhaler.  He had seen Dr. Srivastava previously and was placed on budesonide irrigations for his nasal symptoms with some benefit.  He is doing the budesonide irrigations currently once daily.  He takes Claritin-D once daily, sometimes alternating regular Claritin and pseudoephedrine.  He takes Singulair at bedtime.      The patient reports bilateral nasal congestion but feels like he breathes okay through his nose since having surgery previously.  He does have some intermittent rhinorrhea but denies any significant postnasal drainage.  He denies any taste or smell problems.   He does have some facial pressure and congestion from time to time.  He had 1 previous septoplasty and sinus surgery in 2017.  [facility] in [facility].  I do not have the images for review.  Patient denies any aspirin or nonsteroidal sensitivity.    Past Medical History  Patient Active Problem List   Diagnosis     Allergic rhinitis due to other allergen     Mild intermittent asthma     Controlled type 2 diabetes mellitus without complication, without long-term current use of insulin (H)     Hyperlipidemia     Obesity     KRISTEL (obstructive sleep apnea)     Current Medications     Current Outpatient Medications:      albuterol (PROAIR HFA/PROVENTIL HFA/VENTOLIN HFA) 108 (90 Base) MCG/ACT inhaler, Inhale 2 puffs into the lungs every 6 hours, Disp: 36 g, Rfl: 3     azelastine-fluticasone (DYMISTA) 137-50 MCG/ACT nasal spray, Spray into both nostrils 2 times daily, Disp: , Rfl:      budesonide (PULMICORT) 0.5 MG/2ML neb solution, Place 0.5 mg/2 mL budesonide vial in 8 oz normal saline sinus rinse bottle.  Irrigate each nostril with one half of the bottle twice daily., Disp: 360 mL, Rfl: 3     budesonide (PULMICORT) 0.5 MG/2ML neb solution, Add one ampule into [facility] sinus rinse bottle with saline mixture, and irrigate daily, Disp: 60 mL, Rfl: 11     fluocinonide (LIDEX) 0.05 % external solution, Apply topically 2 times daily, Disp: 60 mL, Rfl: 1     fluticasone (FLONASE) 50 MCG/ACT nasal spray, Spray 1 spray into both nostrils daily, Disp: 16 g, Rfl: 3     fluticasone-salmeterol (ADVAIR) 100-50 MCG/DOSE inhaler, Inhale 1 puff into the lungs every 12 hours, Disp: 3 each, Rfl: 3     loratadine-pseudoePHEDrine (CLARITIN-D 24-HOUR)  MG 24 hr tablet, Take 1 tablet by mouth daily, Disp: 30 tablet, Rfl: 1     montelukast (SINGULAIR) 10 MG tablet, Take 1 tablet (10 mg) by mouth At Bedtime, Disp: 90 tablet, Rfl: 1     MULTI-VITAMIN OR TABS, , Disp: , Rfl:      sildenafil (REVATIO) 20 MG  "tablet, Take 1 tablet (20 mg) by mouth 3 times daily, Disp: 30 tablet, Rfl: 0     valACYclovir (VALTREX) 500 MG tablet, Take 1 tablet (500 mg) by mouth 2 times daily, Disp: 30 tablet, Rfl: 0     ZYRTEC 10 MG OR TABS, 1 tab daily prn , Disp: 30, Rfl: 10     augmented betamethasone dipropionate (DIPROLENE-AF) 0.05 % external cream, Apply sparingly to affected area twice daily as needed.  Do not apply to face. (Patient not taking: Reported on 5/11/2022), Disp: 100 g, Rfl: 3  No current facility-administered medications for this visit.    Allergies  Allergies   Allergen Reactions     Gramineae Pollens Other (See Comments)     Other reaction(s): Runny Nose       Social History   Social History     Socioeconomic History     Marital status:    Tobacco Use     Smoking status: Former Smoker     Smokeless tobacco: Never Used   Vaping Use     Vaping Use: Never used   Substance and Sexual Activity     Alcohol use: Yes     Comment: 2x month     Sexual activity: Not Currently       Family History  Family History   Problem Relation Age of Onset     Diabetes Mother         D: 40 pneumonia,  Type I DM     Family History Negative Father      Family History Negative Brother        Review of Systems  As per HPI and PMHx, otherwise 10+ comprehensive system review is negative.    Physical Exam  BP (!) 143/88   Pulse 89   Temp 97.9  F (36.6  C) (Tympanic)   Ht 1.803 m (5' 11\")   Wt 104.3 kg (230 lb)   BMI 32.08 kg/m    GENERAL: Patient is a pleasant, cooperative 46 year old male in no acute distress.  HEAD: Normocephalic, atraumatic.  Hair and scalp are normal.  EYES: Pupils are equal, round, reactive to light and accommodation.  Extraocular movements are intact.  The sclera nonicteric without injection.  The patient does have obvious prominent edema of the lower eyelids greater on the left-hand side.  The extraocular structures are otherwise normal.   EARS: Normal shape and symmetry.  No tenderness when palpating the mastoid " or tragal areas bilaterally.  Otoscopic exam reveals a minimal amount of cerumen bilaterally.  The bilateral tympanic membranes are round, intact without evidence of effusion, good landmarks.  No retraction, granulation, or drainage.  NOSE: Nares are patent.  Nasal mucosa is boggy and inflamed with sticky, inflammatory mucus.  The patient has a midline nasal septum.  The patient has severe/marked inferior turban hypertrophy bilaterally left greater than right.  No nasal cavity masses, polyps, or mucopurulence on anterior rhinoscopy.  NEUROLOGIC: Cranial nerves II through XII are grossly intact.  Voice is strong.  Patient is House-Brackmann I/VI bilaterally.  CARDIOVASCULAR: Extremities are warm and well-perfused.  No significant peripheral edema.  RESPIRATORY: Patient has nonlabored breathing without cough, wheeze, stridor.  PSYCHIATRIC: Patient is alert and oriented.  Mood and affect appear normal.  SKIN: Warm and dry.  No scalp, face, or neck lesions noted.    Procedure: Flexible Nasal Endoscopy  Indication: Nasal congestion, chronic sinusitis, history of sinus surgery and septoplasty    To best visualize the sinonasal anatomy and due to the chief complaint and HPI, I proceeded with flexible fiberoptic nasal endoscopy.  The bilateral nasal cavities were anesthetized and decongested with a mixture of lidocaine and neosynephrine.  The bilateral nasal cavities were then examined using flexible fiberoptic nasal endoscope.  The right nasal cavity was without masses, polyps, or mucopurulence.  The right middle turbinate and middle meatus was normal in appearance.  The sphenoethmoid recess was clear.  The left nasal cavity was without masses, polyps, or mucopurulence.  The left middle turbinate and middle meatus was normal in appearance.  The sphenoethmoid recess was clear.  The sinonasal mucosa appeared boggy and inflamed with sticky, inflammatory mucus.  The nasal septum is essentially midline.  The nasopharynx had a  normal appearance with normal Eustachian tube openings and fossa of Rosenmuller bilaterally.  Minimal adenoid tissue.  The scope was removed.  The patient tolerated the procedure well.    Assessment and Plan     ICD-10-CM    1. Chronic sinusitis, unspecified location  J32.9 triamcinolone (KENALOG-40) injection 80 mg     budesonide (PULMICORT) 0.5 MG/2ML neb solution     albuterol (PROAIR HFA/PROVENTIL HFA/VENTOLIN HFA) 108 (90 Base) MCG/ACT inhaler     Adult Allergy/Asthma Referral     NASAL ENDOSCOPY, DIAGNOSTIC   2. History of endoscopic sinus surgery  Z98.890 triamcinolone (KENALOG-40) injection 80 mg     budesonide (PULMICORT) 0.5 MG/2ML neb solution     albuterol (PROAIR HFA/PROVENTIL HFA/VENTOLIN HFA) 108 (90 Base) MCG/ACT inhaler     Adult Allergy/Asthma Referral     NASAL ENDOSCOPY, DIAGNOSTIC   3. History of nasal septoplasty  Z98.890 triamcinolone (KENALOG-40) injection 80 mg     budesonide (PULMICORT) 0.5 MG/2ML neb solution     albuterol (PROAIR HFA/PROVENTIL HFA/VENTOLIN HFA) 108 (90 Base) MCG/ACT inhaler     Adult Allergy/Asthma Referral     NASAL ENDOSCOPY, DIAGNOSTIC   4. History of allergic rhinitis  Z87.09 triamcinolone (KENALOG-40) injection 80 mg     budesonide (PULMICORT) 0.5 MG/2ML neb solution     albuterol (PROAIR HFA/PROVENTIL HFA/VENTOLIN HFA) 108 (90 Base) MCG/ACT inhaler     Adult Allergy/Asthma Referral     NASAL ENDOSCOPY, DIAGNOSTIC   5. Severe persistent asthma without complication  J45.50 triamcinolone (KENALOG-40) injection 80 mg     budesonide (PULMICORT) 0.5 MG/2ML neb solution     albuterol (PROAIR HFA/PROVENTIL HFA/VENTOLIN HFA) 108 (90 Base) MCG/ACT inhaler     Adult Allergy/Asthma Referral     NASAL ENDOSCOPY, DIAGNOSTIC     It was my pleasure seeing Silviano Collins today in clinic.  The patient presents with chronic sinusitis without nasal polyposis.    We discussed the pathophysiology of chronic sinusitis.  We discussed medical and surgical management.  I feel the  patient would benefit from nasal saline irrigations with Budesonide.  We discussed proper nasal saline irrigation technique with Budesonide.  The patient would also benefit from an 80 mg intramuscular injection of Kenalog injection today in office.  We discussed the risks, benefits, options, goals of a intramuscular Kenalog injection today in office including, but not limited to: Risk of pain at injection site, risk of infection, side effects of systemic steroids including blood pressure problems, insulin resistance, weight gain, bone/joint issues, mood alteration.  Patient voiced understanding and is willing to proceed.    Given the patient's history, we will also pursue allergy evaluation help both with allergy management and asthma management.    I will attempt to obtain the patient's outside CT imaging for review.  I would like to follow-up on his symptoms in about 4 to 6 weeks.  If he showing significant improvement, we will continue medical management.  If he is not improving or worsening, at some point we should obtain a CT scan for comparison to see if he would be a candidate for surgical intervention to help his paranasal sinus disease.    The patient will return in 8-12 weeks for follow-up.  I will contact the patient 1-2 weeks prior to the return appointment to recheck their symptoms.  If symptoms are not improved, we will obtain a sinus CT and review the imaging at the return appointment.    Juanjose to follow up with Primary Care provider regarding elevated blood pressure.    Brando Khan MD  Department of Otolaryngology-Head and Neck Surgery  [facility] Administered Medication Documentation    Administrations This Visit     triamcinolone (KENALOG-40) injection 80 mg     Admin Date  05/11/2022 Action  Given Dose  80 mg Route  Intramuscular Site  Right Ventrogluteal Administered By  Rochelle Rodas CMA    Ordering Provider: Brando Khan MD    Patient Supplied?: No                   Injectable Medication Documentation    Patient was given Triamcinolone acetonide. Prior to medication administration, verified patients identity using patient s name and date of birth. Please see MAR and medication order for additional information. Patient instructed to report any adverse reaction to staff immediately .      Was entire vial of medication used? Yes  Vial/Syringe: Single dose vial x2  Expiration Date:  10/2023  Was this medication supplied by the patient? No   Rochelle Rodas CMA          Again, thank you for allowing me to participate in the care of your patient.        Sincerely,        Brando Khan MD

## 2022-05-11 NOTE — NURSING NOTE
"Initial BP (!) 156/104 (BP Location: Right arm, Patient Position: Sitting, Cuff Size: Adult Regular)   Pulse 98   Temp 97.9  F (36.6  C) (Tympanic)   Ht 1.803 m (5' 11\")   Wt 104.3 kg (230 lb)   BMI 32.08 kg/m   Estimated body mass index is 32.08 kg/m  as calculated from the following:    Height as of this encounter: 1.803 m (5' 11\").    Weight as of this encounter: 104.3 kg (230 lb). .    Rochelle Rodas CMA    "

## 2022-05-12 ENCOUNTER — TELEPHONE (OUTPATIENT)
Dept: ALLERGY | Facility: OTHER | Age: 47
End: 2022-05-12
Payer: COMMERCIAL

## 2022-05-12 NOTE — TELEPHONE ENCOUNTER
Left message on answering machine for patient to call back. MA scheduled patient allergy consult in [place] on 6/28/22 at 8 AM. Will wait for patient call back.    Ebony Richards MA

## 2022-05-12 NOTE — TELEPHONE ENCOUNTER
----- Message from Curt Champagne MD sent at 5/12/2022  8:13 AM CDT -----  Regarding: RE: Asthma/Allergy patient  6/28 is ok to offer.  He can be added to the waiting list as well.  If something pops up sooner, we can see him at that time.    JACIEL      ----- Message -----  From: Ebony Richards MA  Sent: 5/12/2022   8:09 AM CDT  To: Curt Champagne MD, #  Subject: RE: Asthma/Allergy patient                       Next opening in [place] is 6/28/22 at 8 AM, will this be fine? We do have some sooner appointments but those are set aside for OFC/Cluster IT/Drug challegnes/etc.    Ebony JENSEN  ----- Message -----  From: Curt Champagne MD  Sent: 5/11/2022   8:11 PM CDT  To: [facility] [place], #  Subject: FW: Asthma/Allergy patient                       Can you please offer the patient both [place] and [place] locations? Please add to the waiting list.     JACIEL      ----- Message -----  From: Brando Khan MD  Sent: 5/11/2022   2:58 PM CDT  To: Curt Champagne MD  Subject: Asthma/Allergy patient                           Curt,    I may need help getting this patient in sooner for evaluation.  He has a history of significant allergies on testing about 10 years ago.  He almost underwent allergy therapy but the burden at the time was too high.  He has a significant history of asthma and does take Advair twice daily.  He continues to use his rescue inhaler multiple times a day several times a week.  He has struggled with his nose and sinuses in the past.  He had sinus surgery and septoplasty in 2017 but continues to struggle with chronic nasal congestion.  You can see in his face how significant his nasal congestion is, he has bilateral allergic shiners worse on the left-hand side.    Dr. Srivastava started him on budesonide irrigations a year or so ago.  I am try to get his outside CT for review.  I am going to give him a Kenalog today and have him continue the budesonide irrigations but have him do them twice daily.    Do think  you be able to see him sooner than your next available for evaluation, possible repeat allergy testing, and help with his asthma management?  I refilled his albuterol but did not do anything with his Advair until I heard from you.  He has enough Advair to get hm through for a while.    Thanks,    IJL

## 2022-06-08 ENCOUNTER — TELEPHONE (OUTPATIENT)
Dept: FAMILY MEDICINE | Facility: OTHER | Age: 47
End: 2022-06-08
Payer: COMMERCIAL

## 2022-06-08 NOTE — TELEPHONE ENCOUNTER
It is now recommended that patient's age 45 years old and older consider colonoscopy for routine colon cancer screening.

## 2022-06-08 NOTE — TELEPHONE ENCOUNTER
Reason for Call:  Other call back    Detailed comments: Pt was last seen by Silviano Nolasco 12/23/21 - Pt got a call to set up a colonscopy... Pt is wondering why Gaurav thinks he needs a colonscopy.  Please advise.    Phone Number Patient can be reached at: Cell number on file:    Telephone Information:   Mobile [phone]       Best Time: any    Can we leave a detailed message on this number? Not Applicable    Call taken on 6/8/2022 at 4:09 PM by Agus Conti

## 2022-06-09 ENCOUNTER — OFFICE VISIT (OUTPATIENT)
Dept: ALLERGY | Facility: CLINIC | Age: 47
End: 2022-06-09
Attending: OTOLARYNGOLOGY
Payer: COMMERCIAL

## 2022-06-09 VITALS — HEART RATE: 89 BPM | SYSTOLIC BLOOD PRESSURE: 139 MMHG | DIASTOLIC BLOOD PRESSURE: 94 MMHG

## 2022-06-09 DIAGNOSIS — J31.0 CHRONIC RHINITIS: ICD-10-CM

## 2022-06-09 DIAGNOSIS — J32.9 CHRONIC SINUSITIS, UNSPECIFIED LOCATION: Primary | ICD-10-CM

## 2022-06-09 DIAGNOSIS — J45.40 MODERATE PERSISTENT ASTHMA WITHOUT COMPLICATION: ICD-10-CM

## 2022-06-09 PROCEDURE — 99205 OFFICE O/P NEW HI 60 MIN: CPT | Performed by: ALLERGY & IMMUNOLOGY

## 2022-06-09 ASSESSMENT — ENCOUNTER SYMPTOMS
RHINORRHEA: 1
JOINT SWELLING: 0
SINUS PRESSURE: 1
DIARRHEA: 0
COUGH: 1
FATIGUE: 0
ACTIVITY CHANGE: 0
WHEEZING: 1
ADENOPATHY: 0
SHORTNESS OF BREATH: 1
FEVER: 0
EYE DISCHARGE: 1
EYE REDNESS: 0
ARTHRALGIAS: 0
EYE ITCHING: 0
VOMITING: 0
FACIAL SWELLING: 0
HEADACHES: 0
MYALGIAS: 0
CHEST TIGHTNESS: 0
NAUSEA: 0

## 2022-06-09 ASSESSMENT — ASTHMA QUESTIONNAIRES: ACT_TOTALSCORE: 20

## 2022-06-09 NOTE — PATIENT INSTRUCTIONS
Continue with Flonase 1-2 sprays in each nostril once daily, azelastine 2 sprays in each nostril twice daily as needed.  Take Claritin-D as needed, but rely more on azelastine.    Continue with your current dose of Advair 100/50 mcg 1 puffs once daily. rinse/gargle/spit water after use   -Continue using albuterol inhaler 2-4 puffs every 4 hours as needed for chest tightness/wheezing/shortness of breath/persistent cough.      Get the bloodwork done.     Call to schedule breathing test    [phone]      Do not use albuterol on the day of the breathing test if possible.

## 2022-06-09 NOTE — LETTER
6/9/2022         RE: Silviano Collins  [address] [facility] [address]        Dear Colleague,    Thank you for referring your patient, Silviano Collins, to the [facility]. Please see a copy of my visit note below.    SUBJECTIVE:                                                                   Silviano Collins is a 46-year-old male who presents today to our Allergy Clinic at [facility]; He is being seen in consultation at the request of Dr. Brando Khan, for chronic sinusitis evaluation.     The patient reports a history of environmental allergies for multiple years.  Typically, they are Spring and Fall exacerbated.  Manifested by rhinorrhea, nasal itchiness, sneezing, postnasal drainage, and itchy/watery eyes.  His main concern is a festoon under his left eye.  It has been present for the past 5 years.  It is perennial.  Does not get better with oral antihistamines or systemic steroids.    He is not sure whether his symptoms are worse around the cats.  He does not feel worse around the dogs.  Exposure to dust, mowing grass, and raking leaves will exacerbate his symptoms.  He was a patient of Dr. Caridad Leonardo from [facility].  Was tested in the past.  States that he was offered allergen immunotherapy, but he decided against it because it did not fit his schedule.  He would develop 2-3 sinus infections per year.  Typically, he would require a systemic steroid and an antibiotic.  In between the episodes, he is doing fairly well with a combination of sinus rinses once daily, budesonide rinses once daily, intranasal fluticasone, azelastine, and Claritin-D. Overall, he is satisfied with this regimen.    He reports a history of asthma since he was a teenager.  Manifested by wheezing and shortness of breath.  Triggered by exposure to animals, pollen, and dust.  Denies symptoms with strong emotions, humidity, cold air, or viral respiratory  infections.  He rarely needs prednisone for asthma symptoms.  He uses Advair 100/50 mcg 1 puff once daily and takes montelukast 10 mg by mouth once daily.  States that on this regimen, his symptoms are well controlled.  He uses albuterol inhaler once-twice a week.  This is his usual baseline.  No nocturnal symptoms.  No history of hospitalizations for asthma.        Patient Active Problem List   Diagnosis     Allergic rhinitis due to other allergen     Mild intermittent asthma     Controlled type 2 diabetes mellitus without complication, without long-term current use of insulin (H)     Hyperlipidemia     Obesity     KRISTEL (obstructive sleep apnea)       Past Medical History:   Diagnosis Date     Allergic rhinitis due to other allergen     seasonal allergies (spring and fall)     Mild intermittent asthma     exercise, seasonal, associated with URIs      Problem (# of Occurrences) Relation (Name,Age of Onset)    Diabetes (1) Mother: D: 40 pneumonia,  Type I DM    Family History Negative (2) Father, Brother        Past Surgical History:   Procedure Laterality Date     HC TOOTH EXTRACTION W/FORCEP  1995    4 wisdom teeth removed withotu complications     Social History     Socioeconomic History     Marital status:      Spouse name: None     Number of children: None     Years of education: None     Highest education level: None   Tobacco Use     Smoking status: Former Smoker     Smokeless tobacco: Never Used   Vaping Use     Vaping Use: Never used   Substance and Sexual Activity     Alcohol use: Yes     Comment: 2x month     Sexual activity: Not Currently   Social History Narrative    June 9, 2022    ENVIRONMENTAL HISTORY: [place] home is heated with a forced air. They does have central air conditioning. The patient's bedroom is furnished with feather/wool bedding or pillows.  Pets inside the house include 2 dog(s). There is no history of cockroach or mice  infestation. There is/are 0 smokers in the house.  The house does not have a damp basement.            Review of Systems   Constitutional: Negative for activity change, fatigue and fever.   HENT: Positive for congestion, rhinorrhea and sinus pressure. Negative for ear pain, facial swelling, nosebleeds and sneezing.    Eyes: Positive for discharge. Negative for redness and itching.        Left eye   Respiratory: Positive for cough, shortness of breath and wheezing. Negative for chest tightness.    Cardiovascular: Negative for chest pain.   Gastrointestinal: Negative for diarrhea, nausea and vomiting.   Musculoskeletal: Negative for arthralgias, joint swelling and myalgias.   Skin: Positive for rash.   Neurological: Negative for headaches.   Hematological: Negative for adenopathy.   Psychiatric/Behavioral: Negative for behavioral problems and self-injury.           Current Outpatient Medications:      albuterol (PROAIR HFA/PROVENTIL HFA/VENTOLIN HFA) 108 (90 Base) MCG/ACT inhaler, Inhale 2 puffs into the lungs every 6 hours, Disp: 36 g, Rfl: 3     azelastine-fluticasone (DYMISTA) 137-50 MCG/ACT nasal spray, Spray into both nostrils 2 times daily, Disp: , Rfl:      budesonide (PULMICORT) 0.5 MG/2ML neb solution, Place 0.5 mg/2 mL budesonide vial in 8 oz normal saline sinus rinse bottle.  Irrigate each nostril with one half of the bottle twice daily., Disp: 360 mL, Rfl: 3     budesonide (PULMICORT) 0.5 MG/2ML neb solution, Add one ampule into NeilMed sinus rinse bottle with saline mixture, and irrigate daily, Disp: 60 mL, Rfl: 11     fluocinonide (LIDEX) 0.05 % external solution, Apply topically 2 times daily, Disp: 60 mL, Rfl: 1     fluticasone (FLONASE) 50 MCG/ACT nasal spray, Spray 1 spray into both nostrils daily, Disp: 16 g, Rfl: 3     fluticasone-salmeterol (ADVAIR) 100-50 MCG/DOSE inhaler, Inhale 1 puff into the lungs every 12 hours, Disp: 3 each, Rfl: 3     loratadine-pseudoePHEDrine (CLARITIN-D 24-HOUR)   MG 24 hr tablet, Take 1 tablet by mouth daily, Disp: 30 tablet, Rfl: 1     montelukast (SINGULAIR) 10 MG tablet, Take 1 tablet (10 mg) by mouth At Bedtime, Disp: 90 tablet, Rfl: 1     MULTI-VITAMIN OR TABS, , Disp: , Rfl:      sildenafil (REVATIO) 20 MG tablet, Take 1 tablet (20 mg) by mouth 3 times daily, Disp: 30 tablet, Rfl: 0     valACYclovir (VALTREX) 500 MG tablet, Take 1 tablet (500 mg) by mouth 2 times daily, Disp: 30 tablet, Rfl: 0     ZYRTEC 10 MG OR TABS, 1 tab daily prn , Disp: 30, Rfl: 10     augmented betamethasone dipropionate (DIPROLENE-AF) 0.05 % external cream, Apply sparingly to affected area twice daily as needed.  Do not apply to face. (Patient not taking: No sig reported), Disp: 100 g, Rfl: 3  Immunization History   Administered Date(s) Administered     COVID-19,PF,Pfizer (12+ Yrs) 03/25/2021, 04/15/2021, 11/10/2021     Influenza (IIV3) PF 09/15/2015     Influenza Intranasal Vaccine 10/17/2011     Influenza Vaccine IM > 6 months Valent IIV4 (Alfuria,Fluzone) 02/18/2020, 12/23/2021     Influenza,INJ,MDCK,PF,Quad >4yrs 10/08/2020     Pneumococcal 23 valent 05/04/2021     TD (ADULT, 7+) 11/01/2004     TDAP Vaccine (Boostrix) 02/27/2015     Allergies   Allergen Reactions     Gramineae Pollens Other (See Comments)     Other reaction(s): Runny Nose     OBJECTIVE:                                                                 BP (!) 139/94 (BP Location: Right arm, Patient Position: Sitting, Cuff Size: Adult Regular)   Pulse 89         Physical Exam  Vitals and nursing note reviewed.   Constitutional:       General: He is not in acute distress.     Appearance: He is not diaphoretic.   HENT:      Head: Normocephalic and atraumatic.      Right Ear: Tympanic membrane, ear canal and external ear normal.      Left Ear: Tympanic membrane, ear canal and external ear normal.      Nose: No mucosal edema or rhinorrhea.   Eyes:      General:         Right eye: No discharge.         Left eye: No discharge.       Conjunctiva/sclera: Conjunctivae normal.      Comments: Under his left eye, there is something between an eye bag and a festoon.   Cardiovascular:      Rate and Rhythm: Normal rate and regular rhythm.      Heart sounds: Normal heart sounds. No murmur heard.  Pulmonary:      Effort: Pulmonary effort is normal. No respiratory distress.      Breath sounds: Normal breath sounds. No wheezing or rales.   Musculoskeletal:      Cervical back: Normal range of motion.   Skin:     General: Skin is warm.   Neurological:      Mental Status: He is alert and oriented to person, place, and time.         ASSESSMENT/PLAN:    Chronic sinusitis, unspecified location  Chronic rhinitis    The patient has not stopped Claritin-D and azelastine.  I am unable to perform SPT for aeroallergens for that reason.  -Ordered serum IgE for regional aeroallergen panel.  Overall, he is satisfied with his current regimen of Claritin-D, saline/budesonide irrigations, intranasal fluticasone and azelastine.  We discussed allergen immunotherapy.  He is reluctant.  He is not sure whether it is worth it.  I tried to reason that it could decrease the amount of the medications that he needs, but he he seem to be fine with it.  His main concern is the bag under his eyes/festoon, that does not resolve with antihistamines, intranasal, or systemic steroids.  It has a perennial pattern.  It is not pruritic.  Honestly, I am not entirely convinced that it has allergic origin.  If this is his main problem, I actually would recommend him to see a plastic surgeon.  - I also ordered primary immunodeficiency work-up considering recurrent sinus infections.    - Adult Allergy/Asthma Referral  - Allergen cat epithellium IgE  - Allergen dog epithelium IgE  - Allergen Carmine grass IgE  - Allergen orchard grass IgE  - Allergen jazzy IgE  - Allergen D farinae IgE  - Allergen D pteronyssinus IgE  - Allergen alternaria alternata IgE  - Allergen aspergillus fumigatus IgE  -  Allergen cladosporium herbarum IgE  - Allergen Epicoccum purpurascens IgE  - Allergen penicillium notatum IgE  - Allergen shima white IgE  - Allergen Cedar IgE  - Allergen cottonwood IgE  - Allergen elm IgE  - Allergen maple box elder IgE  - Allergen oak white IgE  - Allergen Red [place] IgE  - Allergen silver  birch IgE  - Allergen Tree White [place] IgE  - Allergen [place] Tree  - Allergen white pine IgE  - Allergen English plantain IgE  - Allergen giant ragweed IgE  - Allergen lamb's quarter IgE  - Allergen Mugwort IgE  - Allergen ragweed short IgE  - Allergen Sagebrush Wormwood IgE  - Allergen Sheep Sorrel IgE  - Allergen thistle Russian IgE  - Allergen Weed Nettle IgE  - Allergen, Kochia/Firebush  - Complement Activity Total (CH50)  - IgA  - IgG  - IgM  - Strep pneumo IgG Abys 23 Serotypes  - T cell subset extended profile  - [facility]; [id number] TETANUS ANTIBODY IGG (Laboratory Miscellaneous Order)  - CBC with Platelets & Differential          Moderate persistent asthma without complication  He is satisfied with his current regimen of Advair 100/50 mcg 1 puff once daily, montelukast 10 mg by mouth once daily, and albuterol as needed.    - Continue as is.  -Ordered baseline pre and postbronchodilator spirometry.    - Adult Allergy/Asthma Referral  - IgE  - General PFT Lab (Please always keep checked)  - Pulmonary Function Test     60 minutes spent on the date of the encounter during chart review, history and exam, documentation, and further activities as noted above.    Return in about 3 months (around 9/9/2022), or if symptoms worsen or fail to improve.    Thank you for allowing us to participate in the care of this patient. Please feel free to contact us if there are any questions or concerns about the patient.    Disclaimer: This note consists of symbols derived from keyboarding, dictation and/or voice recognition software. As a result, there may be errors in the script that have gone undetected. Please  consider this when interpreting information found in this chart.    Curt Champagne MD, FAANICOLEI, ARLYNI  Allergy, Asthma and Immunology     [facility]       Again, thank you for allowing me to participate in the care of your patient.        Sincerely,        Curt Champagne MD

## 2022-06-09 NOTE — TELEPHONE ENCOUNTER
I called and spoke with patient and notified him of Kee's message and he said that he isn't ready yet to get a colonoscopy and he will get one done when he is ready.

## 2022-06-09 NOTE — PROGRESS NOTES
SUBJECTIVE:                                                                   Silviano Collins is a 46-year-old male who presents today to our Allergy Clinic at [facility]; He is being seen in consultation at the request of Dr. Brando Khan, for chronic sinusitis evaluation.     The patient reports a history of environmental allergies for multiple years.  Typically, they are Spring and Fall exacerbated.  Manifested by rhinorrhea, nasal itchiness, sneezing, postnasal drainage, and itchy/watery eyes.  His main concern is a festoon under his left eye.  It has been present for the past 5 years.  It is perennial.  Does not get better with oral antihistamines or systemic steroids.    He is not sure whether his symptoms are worse around the cats.  He does not feel worse around the dogs.  Exposure to dust, mowing grass, and raking leaves will exacerbate his symptoms.  He was a patient of Dr. Caridad Leonardo from [facility].  Was tested in the past.  States that he was offered allergen immunotherapy, but he decided against it because it did not fit his schedule.  He would develop 2-3 sinus infections per year.  Typically, he would require a systemic steroid and an antibiotic.  In between the episodes, he is doing fairly well with a combination of sinus rinses once daily, budesonide rinses once daily, intranasal fluticasone, azelastine, and Claritin-D. Overall, he is satisfied with this regimen.    He reports a history of asthma since he was a teenager.  Manifested by wheezing and shortness of breath.  Triggered by exposure to animals, pollen, and dust.  Denies symptoms with strong emotions, humidity, cold air, or viral respiratory infections.  He rarely needs prednisone for asthma symptoms.  He uses Advair 100/50 mcg 1 puff once daily and takes montelukast 10 mg by mouth once daily.  States that on this regimen, his symptoms are well controlled.  He uses albuterol inhaler once-twice a week.   This is his usual baseline.  No nocturnal symptoms.  No history of hospitalizations for asthma.        Patient Active Problem List   Diagnosis     Allergic rhinitis due to other allergen     Mild intermittent asthma     Controlled type 2 diabetes mellitus without complication, without long-term current use of insulin (H)     Hyperlipidemia     Obesity     KRISTEL (obstructive sleep apnea)       Past Medical History:   Diagnosis Date     Allergic rhinitis due to other allergen     seasonal allergies (spring and fall)     Mild intermittent asthma     exercise, seasonal, associated with URIs      Problem (# of Occurrences) Relation (Name,Age of Onset)    Diabetes (1) Mother: D: 40 pneumonia,  Type I DM    Family History Negative (2) Father, Brother        Past Surgical History:   Procedure Laterality Date     HC TOOTH EXTRACTION W/FORCEP  1995    4 wisdom teeth removed withotu complications     Social History     Socioeconomic History     Marital status:      Spouse name: None     Number of children: None     Years of education: None     Highest education level: None   Tobacco Use     Smoking status: Former Smoker     Smokeless tobacco: Never Used   Vaping Use     Vaping Use: Never used   Substance and Sexual Activity     Alcohol use: Yes     Comment: 2x month     Sexual activity: Not Currently   Social History Narrative    June 9, 2022    ENVIRONMENTAL HISTORY: [place] home is heated with a forced air. They does have central air conditioning. The patient's bedroom is furnished with feather/wool bedding or pillows.  Pets inside the house include 2 dog(s). There is no history of cockroach or mice infestation. There is/are 0 smokers in the house.  The house does not have a damp basement.            Review of Systems   Constitutional: Negative for activity change, fatigue and fever.   HENT: Positive for congestion, rhinorrhea and sinus pressure. Negative for ear pain,  facial swelling, nosebleeds and sneezing.    Eyes: Positive for discharge. Negative for redness and itching.        Left eye   Respiratory: Positive for cough, shortness of breath and wheezing. Negative for chest tightness.    Cardiovascular: Negative for chest pain.   Gastrointestinal: Negative for diarrhea, nausea and vomiting.   Musculoskeletal: Negative for arthralgias, joint swelling and myalgias.   Skin: Positive for rash.   Neurological: Negative for headaches.   Hematological: Negative for adenopathy.   Psychiatric/Behavioral: Negative for behavioral problems and self-injury.           Current Outpatient Medications:      albuterol (PROAIR HFA/PROVENTIL HFA/VENTOLIN HFA) 108 (90 Base) MCG/ACT inhaler, Inhale 2 puffs into the lungs every 6 hours, Disp: 36 g, Rfl: 3     azelastine-fluticasone (DYMISTA) 137-50 MCG/ACT nasal spray, Spray into both nostrils 2 times daily, Disp: , Rfl:      budesonide (PULMICORT) 0.5 MG/2ML neb solution, Place 0.5 mg/2 mL budesonide vial in 8 oz normal saline sinus rinse bottle.  Irrigate each nostril with one half of the bottle twice daily., Disp: 360 mL, Rfl: 3     budesonide (PULMICORT) 0.5 MG/2ML neb solution, Add one ampule into NeilMed sinus rinse bottle with saline mixture, and irrigate daily, Disp: 60 mL, Rfl: 11     fluocinonide (LIDEX) 0.05 % external solution, Apply topically 2 times daily, Disp: 60 mL, Rfl: 1     fluticasone (FLONASE) 50 MCG/ACT nasal spray, Spray 1 spray into both nostrils daily, Disp: 16 g, Rfl: 3     fluticasone-salmeterol (ADVAIR) 100-50 MCG/DOSE inhaler, Inhale 1 puff into the lungs every 12 hours, Disp: 3 each, Rfl: 3     loratadine-pseudoePHEDrine (CLARITIN-D 24-HOUR)  MG 24 hr tablet, Take 1 tablet by mouth daily, Disp: 30 tablet, Rfl: 1     montelukast (SINGULAIR) 10 MG tablet, Take 1 tablet (10 mg) by mouth At Bedtime, Disp: 90 tablet, Rfl: 1     MULTI-VITAMIN OR TABS, , Disp: , Rfl:      sildenafil (REVATIO) 20 MG tablet, Take 1 tablet  (20 mg) by mouth 3 times daily, Disp: 30 tablet, Rfl: 0     valACYclovir (VALTREX) 500 MG tablet, Take 1 tablet (500 mg) by mouth 2 times daily, Disp: 30 tablet, Rfl: 0     ZYRTEC 10 MG OR TABS, 1 tab daily prn , Disp: 30, Rfl: 10     augmented betamethasone dipropionate (DIPROLENE-AF) 0.05 % external cream, Apply sparingly to affected area twice daily as needed.  Do not apply to face. (Patient not taking: No sig reported), Disp: 100 g, Rfl: 3  Immunization History   Administered Date(s) Administered     COVID-19,PF,Pfizer (12+ Yrs) 03/25/2021, 04/15/2021, 11/10/2021     Influenza (IIV3) PF 09/15/2015     Influenza Intranasal Vaccine 10/17/2011     Influenza Vaccine IM > 6 months Valent IIV4 (Alfuria,Fluzone) 02/18/2020, 12/23/2021     Influenza,INJ,MDCK,PF,Quad >4yrs 10/08/2020     Pneumococcal 23 valent 05/04/2021     TD (ADULT, 7+) 11/01/2004     TDAP Vaccine (Boostrix) 02/27/2015     Allergies   Allergen Reactions     Gramineae Pollens Other (See Comments)     Other reaction(s): Runny Nose     OBJECTIVE:                                                                 BP (!) 139/94 (BP Location: Right arm, Patient Position: Sitting, Cuff Size: Adult Regular)   Pulse 89         Physical Exam  Vitals and nursing note reviewed.   Constitutional:       General: He is not in acute distress.     Appearance: He is not diaphoretic.   HENT:      Head: Normocephalic and atraumatic.      Right Ear: Tympanic membrane, ear canal and external ear normal.      Left Ear: Tympanic membrane, ear canal and external ear normal.      Nose: No mucosal edema or rhinorrhea.   Eyes:      General:         Right eye: No discharge.         Left eye: No discharge.      Conjunctiva/sclera: Conjunctivae normal.      Comments: Under his left eye, there is something between an eye bag and a festoon.   Cardiovascular:      Rate and Rhythm: Normal rate and regular rhythm.      Heart sounds: Normal heart sounds. No murmur heard.  Pulmonary:       Effort: Pulmonary effort is normal. No respiratory distress.      Breath sounds: Normal breath sounds. No wheezing or rales.   Musculoskeletal:      Cervical back: Normal range of motion.   Skin:     General: Skin is warm.   Neurological:      Mental Status: He is alert and oriented to person, place, and time.         ASSESSMENT/PLAN:    Chronic sinusitis, unspecified location  Chronic rhinitis    The patient has not stopped Claritin-D and azelastine.  I am unable to perform SPT for aeroallergens for that reason.  -Ordered serum IgE for regional aeroallergen panel.  Overall, he is satisfied with his current regimen of Claritin-D, saline/budesonide irrigations, intranasal fluticasone and azelastine.  We discussed allergen immunotherapy.  He is reluctant.  He is not sure whether it is worth it.  I tried to reason that it could decrease the amount of the medications that he needs, but he he seem to be fine with it.  His main concern is the bag under his eyes/festoon, that does not resolve with antihistamines, intranasal, or systemic steroids.  It has a perennial pattern.  It is not pruritic.  Honestly, I am not entirely convinced that it has allergic origin.  If this is his main problem, I actually would recommend him to see a plastic surgeon.  - I also ordered primary immunodeficiency work-up considering recurrent sinus infections.    - Adult Allergy/Asthma Referral  - Allergen cat epithellium IgE  - Allergen dog epithelium IgE  - Allergen Carmine grass IgE  - Allergen orchard grass IgE  - Allergen jazzy IgE  - Allergen D farinae IgE  - Allergen D pteronyssinus IgE  - Allergen alternaria alternata IgE  - Allergen aspergillus fumigatus IgE  - Allergen cladosporium herbarum IgE  - Allergen Epicoccum purpurascens IgE  - Allergen penicillium notatum IgE  - Allergen shima white IgE  - Allergen Cedar IgE  - Allergen cottonwood IgE  - Allergen elm IgE  - Allergen maple box elder IgE  - Allergen oak white IgE  - Allergen Red  [place] IgE  - Allergen silver  birch IgE  - Allergen Tree White [place] IgE  - Allergen [place] Tree  - Allergen white pine IgE  - Allergen English plantain IgE  - Allergen giant ragweed IgE  - Allergen lamb's quarter IgE  - Allergen Mugwort IgE  - Allergen ragweed short IgE  - Allergen Sagebrush [place] IgE  - Allergen Sheep Sorrel IgE  - Allergen thistle Russian IgE  - Allergen Weed Nettle IgE  - Allergen, Kochia/Firebush  - Complement Activity Total (CH50)  - IgA  - IgG  - IgM  - Strep pneumo IgG Abys 23 Serotypes  - T cell subset extended profile  - [facility]; [id number] TETANUS ANTIBODY IGG (Laboratory Miscellaneous Order)  - CBC with Platelets & Differential          Moderate persistent asthma without complication  He is satisfied with his current regimen of Advair 100/50 mcg 1 puff once daily, montelukast 10 mg by mouth once daily, and albuterol as needed.    - Continue as is.  -Ordered baseline pre and postbronchodilator spirometry.    - Adult Allergy/Asthma Referral  - IgE  - General PFT Lab (Please always keep checked)  - Pulmonary Function Test     60 minutes spent on the date of the encounter during chart review, history and exam, documentation, and further activities as noted above.    Return in about 3 months (around 9/9/2022), or if symptoms worsen or fail to improve.    Thank you for allowing us to participate in the care of this patient. Please feel free to contact us if there are any questions or concerns about the patient.    Disclaimer: This note consists of symbols derived from keyboarding, dictation and/or voice recognition software. As a result, there may be errors in the script that have gone undetected. Please consider this when interpreting information found in this chart.    Curt Champagne MD, FAAAAI, FACAAI  Allergy, Asthma and Immunology     [facility]

## 2022-06-21 ENCOUNTER — OFFICE VISIT (OUTPATIENT)
Dept: INTERNAL MEDICINE | Facility: CLINIC | Age: 47
End: 2022-06-21
Payer: COMMERCIAL

## 2022-06-21 VITALS
HEART RATE: 78 BPM | DIASTOLIC BLOOD PRESSURE: 80 MMHG | BODY MASS INDEX: 31.36 KG/M2 | OXYGEN SATURATION: 97 % | HEIGHT: 71 IN | WEIGHT: 224 LBS | SYSTOLIC BLOOD PRESSURE: 120 MMHG

## 2022-06-21 DIAGNOSIS — Z83.49 FAMILY HISTORY OF VITAMIN D DEFICIENCY: ICD-10-CM

## 2022-06-21 DIAGNOSIS — Z13.220 SCREENING FOR HYPERLIPIDEMIA: ICD-10-CM

## 2022-06-21 DIAGNOSIS — J32.9 CHRONIC SINUSITIS, UNSPECIFIED LOCATION: ICD-10-CM

## 2022-06-21 DIAGNOSIS — Z00.00 ANNUAL PHYSICAL EXAM: ICD-10-CM

## 2022-06-21 DIAGNOSIS — Z00.00 HEALTHCARE MAINTENANCE: ICD-10-CM

## 2022-06-21 DIAGNOSIS — Z13.220 SCREENING FOR LIPOID DISORDERS: ICD-10-CM

## 2022-06-21 DIAGNOSIS — Z11.59 NEED FOR HEPATITIS C SCREENING TEST: ICD-10-CM

## 2022-06-21 DIAGNOSIS — J45.20 MILD INTERMITTENT ASTHMA, UNSPECIFIED WHETHER COMPLICATED: ICD-10-CM

## 2022-06-21 DIAGNOSIS — Z11.4 SCREENING FOR HIV (HUMAN IMMUNODEFICIENCY VIRUS): ICD-10-CM

## 2022-06-21 DIAGNOSIS — E11.9 CONTROLLED TYPE 2 DIABETES MELLITUS WITHOUT COMPLICATION, WITHOUT LONG-TERM CURRENT USE OF INSULIN (H): Primary | ICD-10-CM

## 2022-06-21 DIAGNOSIS — N52.9 ERECTILE DYSFUNCTION, UNSPECIFIED ERECTILE DYSFUNCTION TYPE: ICD-10-CM

## 2022-06-21 DIAGNOSIS — Z23 NEED FOR PNEUMOCOCCAL VACCINATION: ICD-10-CM

## 2022-06-21 DIAGNOSIS — Z12.11 SCREEN FOR COLON CANCER: ICD-10-CM

## 2022-06-21 PROBLEM — E55.9 VITAMIN D DEFICIENCY: Status: ACTIVE | Noted: 2019-06-12

## 2022-06-21 LAB
ANION GAP SERPL CALCULATED.3IONS-SCNC: 11 MMOL/L (ref 5–18)
BUN SERPL-MCNC: 13 MG/DL (ref 8–22)
CALCIUM SERPL-MCNC: 9.3 MG/DL (ref 8.5–10.5)
CHLORIDE BLD-SCNC: 103 MMOL/L (ref 98–107)
CO2 SERPL-SCNC: 26 MMOL/L (ref 22–31)
CREAT SERPL-MCNC: 0.95 MG/DL (ref 0.7–1.3)
GFR SERPL CREATININE-BSD FRML MDRD: >90 ML/MIN/1.73M2
GLUCOSE BLD-MCNC: 193 MG/DL (ref 70–125)
HBA1C MFR BLD: 7.8 % (ref 0–5.6)
HIV 1+2 AB+HIV1 P24 AG SERPL QL IA: NEGATIVE
LDLC SERPL CALC-MCNC: 134 MG/DL
POTASSIUM BLD-SCNC: 4.1 MMOL/L (ref 3.5–5)
SODIUM SERPL-SCNC: 140 MMOL/L (ref 136–145)

## 2022-06-21 PROCEDURE — 86803 HEPATITIS C AB TEST: CPT | Performed by: NURSE PRACTITIONER

## 2022-06-21 PROCEDURE — 90677 PCV20 VACCINE IM: CPT | Performed by: NURSE PRACTITIONER

## 2022-06-21 PROCEDURE — 83721 ASSAY OF BLOOD LIPOPROTEIN: CPT | Performed by: NURSE PRACTITIONER

## 2022-06-21 PROCEDURE — 99396 PREV VISIT EST AGE 40-64: CPT | Mod: 25 | Performed by: NURSE PRACTITIONER

## 2022-06-21 PROCEDURE — 87389 HIV-1 AG W/HIV-1&-2 AB AG IA: CPT | Performed by: NURSE PRACTITIONER

## 2022-06-21 PROCEDURE — 82306 VITAMIN D 25 HYDROXY: CPT | Performed by: NURSE PRACTITIONER

## 2022-06-21 PROCEDURE — 99214 OFFICE O/P EST MOD 30 MIN: CPT | Mod: 25 | Performed by: NURSE PRACTITIONER

## 2022-06-21 PROCEDURE — 90471 IMMUNIZATION ADMIN: CPT | Performed by: NURSE PRACTITIONER

## 2022-06-21 PROCEDURE — 85007 BL SMEAR W/DIFF WBC COUNT: CPT | Performed by: NURSE PRACTITIONER

## 2022-06-21 PROCEDURE — 85027 COMPLETE CBC AUTOMATED: CPT | Performed by: NURSE PRACTITIONER

## 2022-06-21 PROCEDURE — 80048 BASIC METABOLIC PNL TOTAL CA: CPT | Performed by: NURSE PRACTITIONER

## 2022-06-21 PROCEDURE — 36415 COLL VENOUS BLD VENIPUNCTURE: CPT | Performed by: NURSE PRACTITIONER

## 2022-06-21 PROCEDURE — 83036 HEMOGLOBIN GLYCOSYLATED A1C: CPT | Performed by: NURSE PRACTITIONER

## 2022-06-21 RX ORDER — FLUTICASONE PROPIONATE 50 MCG
1 SPRAY, SUSPENSION (ML) NASAL DAILY
Qty: 16 G | Refills: 3 | Status: SHIPPED | OUTPATIENT
Start: 2022-06-21 | End: 2023-05-07

## 2022-06-21 RX ORDER — AZELASTINE HYDROCHLORIDE, FLUTICASONE PROPIONATE 137; 50 UG/1; UG/1
1 SPRAY, METERED NASAL 2 TIMES DAILY
Qty: 23 G | Refills: 3 | Status: SHIPPED | OUTPATIENT
Start: 2022-06-21 | End: 2022-06-27

## 2022-06-21 RX ORDER — SILDENAFIL CITRATE 20 MG/1
20 TABLET ORAL 3 TIMES DAILY
Qty: 30 TABLET | Refills: 0 | Status: SHIPPED | OUTPATIENT
Start: 2022-06-21 | End: 2023-01-14

## 2022-06-21 ASSESSMENT — ENCOUNTER SYMPTOMS
MYALGIAS: 1
SHORTNESS OF BREATH: 0
DYSURIA: 0
PALPITATIONS: 0
CONSTIPATION: 0
DIARRHEA: 0
WEAKNESS: 0
HEMATURIA: 0
ABDOMINAL PAIN: 1
COUGH: 0
HEADACHES: 0
JOINT SWELLING: 0
HEARTBURN: 0
CHILLS: 0
SORE THROAT: 0
DIZZINESS: 0
ARTHRALGIAS: 0
FEVER: 0
NERVOUS/ANXIOUS: 0
PARESTHESIAS: 0
HEMATOCHEZIA: 0
NAUSEA: 0
FREQUENCY: 0
EYE PAIN: 0

## 2022-06-21 NOTE — PROGRESS NOTES
SUBJECTIVE:   CC: Silviano Collins is an 46 year old male who presents for preventative health visit.       Patient has been advised of split billing requirements and indicates understanding: Yes  Healthy Habits:     Getting at least 3 servings of Calcium per day:  NO    Bi-annual eye exam:  Yes    Dental care twice a year:  NO    Sleep apnea or symptoms of sleep apnea:  Sleep apnea    Diet:  Regular (no restrictions)    Frequency of exercise:  1 day/week    Duration of exercise:  15-30 minutes    Taking medications regularly:  Yes    Medication side effects:  Not applicable and None    PHQ-2 Total Score: 0      Today's PHQ-2 Score:   PHQ-2 ( 1999 Pfizer) 6/21/2022   Q1: Little interest or pleasure in doing things 0   Q2: Feeling down, depressed or hopeless 0   PHQ-2 Score 0   Q1: Little interest or pleasure in doing things Not at all   Q2: Feeling down, depressed or hopeless Not at all   PHQ-2 Score 0       Abuse: Current or Past(Physical, Sexual or Emotional)- No  Do you feel safe in your environment? Yes    Have you ever done Advance Care Planning? (For example, a Health Directive, POLST, or a discussion with a medical provider or your loved ones about your wishes): Yes, advance care planning is on file.    Social History     Tobacco Use     Smoking status: Former Smoker     Smokeless tobacco: Never Used   Substance Use Topics     Alcohol use: Yes     Comment: 2x month     If you drink alcohol do you typically have >3 drinks per day or >7 drinks per week? No    Alcohol Use 6/21/2022   Prescreen: >3 drinks/day or >7 drinks/week? Yes   AUDIT SCORE  10       Last PSA: No results found for: PSA    Reviewed orders with patient. Reviewed health maintenance and updated orders accordingly - Yes      Reviewed and updated as needed this visit by clinical staff   Tobacco  Allergies  Meds                Reviewed and updated as needed this visit by Provider                   Past Medical History:   Diagnosis Date      "Allergic rhinitis due to other allergen     seasonal allergies (spring and fall)     Mild intermittent asthma     exercise, seasonal, associated with URIs        Review of Systems   Constitutional: Negative for chills and fever.   HENT: Negative for congestion, ear pain, hearing loss and sore throat.    Eyes: Negative for pain and visual disturbance.   Respiratory: Negative for cough and shortness of breath.    Cardiovascular: Negative for chest pain, palpitations and peripheral edema.   Gastrointestinal: Positive for abdominal pain. Negative for constipation, diarrhea, heartburn, hematochezia and nausea.   Genitourinary: Positive for impotence. Negative for dysuria, frequency, genital sores, hematuria, penile discharge and urgency.   Musculoskeletal: Positive for myalgias. Negative for arthralgias and joint swelling.   Skin: Negative for rash.   Neurological: Negative for dizziness, weakness, headaches and paresthesias.   Psychiatric/Behavioral: The patient is not nervous/anxious.      CONSTITUTIONAL: NEGATIVE for fever, chills, change in weight  INTEGUMENTARY/SKIN: NEGATIVE for worrisome rashes, moles or lesions  EYES: NEGATIVE for vision changes or irritation  ENT: NEGATIVE for ear, mouth and throat problems  RESP: NEGATIVE for significant cough or SOB  CV: NEGATIVE for chest pain, palpitations or peripheral edema  GI: NEGATIVE for nausea, abdominal pain, heartburn, or change in bowel habits   male: negative for dysuria, hematuria, decreased urinary stream, erectile dysfunction, urethral discharge  MUSCULOSKELETAL: NEGATIVE for significant arthralgias or myalgia  NEURO: NEGATIVE for weakness, dizziness or paresthesias  PSYCHIATRIC: NEGATIVE for changes in mood or affect    OBJECTIVE:   /80 (BP Location: Right arm, Patient Position: Sitting)   Pulse 78   Ht 1.803 m (5' 11\")   Wt 101.6 kg (224 lb)   SpO2 97%   BMI 31.24 kg/m      Physical Exam  GENERAL: healthy, alert and no distress  NECK: no " adenopathy, no asymmetry, masses, or scars and thyroid normal to palpation  RESP: lungs clear to auscultation - no rales, rhonchi or wheezes  CV: regular rate and rhythm, normal S1 S2, no S3 or S4, no murmur, click or rub, no peripheral edema and peripheral pulses strong  ABDOMEN: soft, nontender, no hepatosplenomegaly, no masses and bowel sounds normal  MS: no gross musculoskeletal defects noted, no edema    Diagnostic Test Results:  Labs reviewed in Epic    ASSESSMENT/PLAN:     1. Annual physical exam  Generally fairly good health habits    2. Healthcare maintenance  We talked extensively about getting a colonoscopy ordered but he wants to hold off on this    3. Mild intermittent asthma, unspecified whether complicated  Well-controlled.  He follows with allergy on this  - fluticasone (FLONASE) 50 MCG/ACT nasal spray; Spray 1 spray into both nostrils daily  Dispense: 16 g; Refill: 3  - azelastine-fluticasone (DYMISTA) 137-50 MCG/ACT nasal spray; Spray 1 spray into both nostrils 2 times daily  Dispense: 23 g; Refill: 3    4. Screening for hyperlipidemia  He is not fasting today we will check a direct LDL    5. Screen for colon cancer  Declines colon cancer screening    6. Screening for HIV (human immunodeficiency virus)  - HIV Antigen Antibody Combo; Future    7. Need for hepatitis C screening test  - Hepatitis C Screen Reflex to HCV RNA Quant and Genotype; Future    8. Erectile dysfunction, unspecified erectile dysfunction type  Paper prescription for sildenafil provided.  He typically takes this to  [facility].  Gets good results  - sildenafil (REVATIO) 20 MG tablet; Take 1 tablet (20 mg) by mouth 3 times daily  Dispense: 30 tablet; Refill: 0    9. Controlled type 2 diabetes mellitus without complication, without long-term current use of insulin (H)  Diet controlled.  His last hemoglobin A1c was checked at 6.5.  We will recheck today.  - HEMOGLOBIN A1C; Future  - BASIC METABOLIC PANEL; Future  - Albumin Random Urine  "Quantitative with Creat Ratio; Future  - OPTOMETRY REFERRAL; Future    10. Need for pneumococcal vaccination  - Pneumococcal 20 Valent Conjugate (Prevnar 20)    11. Screening for lipoid disorders  - LDL cholesterol direct; Future    12. Family history of vitamin D deficiency  - Vitamin D Deficiency; Future    .  Patient Instructions   BMI is too high.  I like you to lose 10 to 15 pounds over the next 6 to 8 months.    Blood pressure and other vital signs look good.    Prevnar 20 today.    Need to get a colonoscopy.    We will check a variety of labs today, results will be made available to you on Cort[facility]        Patient has been advised of split billing requirements and indicates understanding: Yes    COUNSELING:   Reviewed preventive health counseling, as reflected in patient instructions       Regular exercise       Healthy diet/nutrition       Vision screening    Estimated body mass index is 31.24 kg/m  as calculated from the following:    Height as of this encounter: 1.803 m (5' 11\").    Weight as of this encounter: 101.6 kg (224 lb).     Weight management plan: Patient was referred to their PCP to discuss a diet and exercise plan.    He reports that he has quit smoking. He has never used smokeless tobacco.      Counseling Resources:  ATP IV Guidelines  Pooled Cohorts Equation Calculator  FRAX Risk Assessment  ICSI Preventive Guidelines  Dietary Guidelines for Americans, 2010  USDA's MyPlate  ASA Prophylaxis  Lung CA Screening    Silviano Nolasco [facility]            .  ..  "

## 2022-06-21 NOTE — PATIENT INSTRUCTIONS
BMI is too high.  I like you to lose 10 to 15 pounds over the next 6 to 8 months.    Blood pressure and other vital signs look good.    Prevnar 20 today.    Need to get a colonoscopy.    We will check a variety of labs today, results will be made available to you on [facility]

## 2022-06-22 ENCOUNTER — TELEPHONE (OUTPATIENT)
Dept: FAMILY MEDICINE | Facility: OTHER | Age: 47
End: 2022-06-22

## 2022-06-22 DIAGNOSIS — J32.9 CHRONIC SINUSITIS, UNSPECIFIED LOCATION: ICD-10-CM

## 2022-06-22 DIAGNOSIS — J45.40 MODERATE PERSISTENT ASTHMA WITHOUT COMPLICATION: ICD-10-CM

## 2022-06-22 LAB
BASOPHILS # BLD MANUAL: 0.1 10E3/UL (ref 0–0.2)
BASOPHILS NFR BLD MANUAL: 1 %
DEPRECATED CALCIDIOL+CALCIFEROL SERPL-MC: 22 UG/L (ref 20–75)
EOSINOPHIL # BLD MANUAL: 0.3 10E3/UL (ref 0–0.7)
EOSINOPHIL NFR BLD MANUAL: 4 %
ERYTHROCYTE [DISTWIDTH] IN BLOOD BY AUTOMATED COUNT: 13.2 % (ref 10–15)
HCT VFR BLD AUTO: 47.8 % (ref 40–53)
HCV AB SERPL QL IA: NONREACTIVE
HGB BLD-MCNC: 15.7 G/DL (ref 13.3–17.7)
IGA SERPL-MCNC: 196 MG/DL (ref 84–499)
IGG SERPL-MCNC: 753 MG/DL (ref 610–1616)
IGM SERPL-MCNC: 49 MG/DL (ref 35–242)
LYMPHOCYTES # BLD MANUAL: 2.5 10E3/UL (ref 0.8–5.3)
LYMPHOCYTES NFR BLD MANUAL: 36 %
MCH RBC QN AUTO: 30.3 PG (ref 26.5–33)
MCHC RBC AUTO-ENTMCNC: 32.8 G/DL (ref 31.5–36.5)
MCV RBC AUTO: 92 FL (ref 78–100)
MONOCYTES # BLD MANUAL: 0.5 10E3/UL (ref 0–1.3)
MONOCYTES NFR BLD MANUAL: 7 %
NEUTROPHILS # BLD MANUAL: 3.6 10E3/UL (ref 1.6–8.3)
NEUTROPHILS NFR BLD MANUAL: 52 %
PLAT MORPH BLD: NORMAL
PLATELET # BLD AUTO: 208 10E3/UL (ref 150–450)
RBC # BLD AUTO: 5.19 10E6/UL (ref 4.4–5.9)
RBC MORPH BLD: NORMAL
WBC # BLD AUTO: 6.9 10E3/UL (ref 4–11)

## 2022-06-22 NOTE — TELEPHONE ENCOUNTER
"6-22-22  Pt called back, I relayed:  \"hemoglobin A1c was elevated.  It was 7.8.  This means that he has type 2 diabetes and he needs to watch his diet closely and work on trying to lose some weight.  We do not need to worry about starting diabetes medications right now but we should definitely recheck a hemoglobin A1c in 3 to 6 months.     We tried to run some allergy labs yesterday while he was here but it looks like we were not able to consolidate his physical labs with the labs that were ordered from his allergist.  I called the lab and they are requesting that he come back for a \"lab only\" appointment to obtain the specific lab work for his allergist as some of these labs will need to be shipped down to the [facility] and even out of state.\"    Pt has questions:  1) on the A1C results, pt wants to know what he should & shouldn't be eating  2)pt wants a explanation on his testosterone level  3)how much exercise should be doing    krsytal   "

## 2022-06-22 NOTE — TELEPHONE ENCOUNTER
"I called patient and left him a voicemail.  If he calls back, please relay the following message:    His hemoglobin A1c was elevated.  It was 7.8.  This means that he has type 2 diabetes and he needs to watch his diet closely and work on trying to lose some weight.  We do not need to worry about starting diabetes medications right now but we should definitely recheck a hemoglobin A1c in 3 to 6 months.    We tried to run some allergy labs yesterday while he was here but it looks like we were not able to consolidate his physical labs with the labs that were ordered from his allergist.  I called the lab and they are requesting that he come back for a \"lab only\" appointment to obtain the specific lab work for his allergist as some of these labs will need to be shipped down to the [facility] and even out of state.      "

## 2022-06-23 LAB
A ALTERNATA IGE QN: 1.18 KU(A)/L
A FUMIGATUS IGE QN: 1.04 KU(A)/L
C HERBARUM IGE QN: 0.51 KU(A)/L
CALIF WALNUT POLN IGE QN: 57.7 KU(A)/L
CAT DANDER IGG QN: 4.68 KU(A)/L
CEDAR IGE QN: 0.87 KU(A)/L
COCKSFOOT IGE QN: 73.2 KU(A)/L
COMMON RAGWEED IGE QN: 59 KU(A)/L
COTTONWOOD IGE QN: 13 KU(A)/L
D FARINAE IGE QN: 0.58 KU(A)/L
D PTERONYSS IGE QN: 0.75 KU(A)/L
DOG DANDER+EPITH IGE QN: 1.96 KU(A)/L
E PURPURASCENS IGE QN: 0.25 KU(A)/L
EAST WHITE PINE IGE QN: 4.17 KU(A)/L
ENGL PLANTAIN IGE QN: 31.3 KU(A)/L
FIREBUSH IGE QN: 18.7 KU(A)/L
GIANT RAGWEED IGE QN: 14.6 KU(A)/L
GOOSEFOOT IGE QN: 21.4 KU(A)/L
IGE SERPL-ACNC: 1811 KU/L (ref 0–114)
JOHNSON GRASS IGE QN: 36.3 KU(A)/L
MAPLE IGE QN: 31.9 KU(A)/L
MUGWORT IGE QN: 7.98 KU(A)/L
NETTLE IGE QN: 3.04 KU(A)/L
P NOTATUM IGE QN: 0.77 KU(A)/L
RED MULBERRY IGE QN: 0.31 KU(A)/L
SALTWORT IGE QN: 21.5 KU(A)/L
SHEEP SORREL IGE QN: 33.1 KU(A)/L
SILVER BIRCH IGE QN: >100 KU(A)/L
TIMOTHY IGE QN: 68.8 KU(A)/L
WHITE ASH IGE QN: 55.5 KU(A)/L
WHITE ELM IGE QN: 31.7 KU(A)/L
WHITE MULBERRY IGE QN: 0.69 KU(A)/L
WHITE OAK IGE QN: 77.4 KU(A)/L
WORMWOOD IGE QN: 15.4 KU(A)/L

## 2022-06-27 DIAGNOSIS — J45.20 MILD INTERMITTENT ASTHMA, UNSPECIFIED WHETHER COMPLICATED: Primary | ICD-10-CM

## 2022-06-27 RX ORDER — AZELASTINE 1 MG/ML
1 SPRAY, METERED NASAL 2 TIMES DAILY
Qty: 30 ML | Refills: 11 | Status: SHIPPED | OUTPATIENT
Start: 2022-06-27 | End: 2022-12-01

## 2022-06-27 NOTE — RESULT ENCOUNTER NOTE
[employer] message sent:       Elevated total serum IgE, which is not uncommon for the patients with allergic rhinitis, asthma, food allergies, and/or eczema.  Serum IgE for regional aeroallergen panel with sensitivity to cat, dog, dust mites, grass pollen, tree pollen, weed pollen, and molds.  -Recommend avoidance measures.  Continue current regimen of nasal steroids and nasal antihistamine.  If symptoms persist despite medications and allergen avoidance, or if medications are not tolerated, allergen immunotherapy (allergy shots) is recommended.         Immunoglobulins M, G, and A are within normal limits.  Even though I ordered other labs related to primary immunodeficiency work-up, it looks like the lab has not performed them.  Very well could be that they could not draw more blood on that day.  I would recommend to set up an appointment with the lab and complete the work-up.

## 2022-06-29 ENCOUNTER — LAB (OUTPATIENT)
Dept: LAB | Facility: CLINIC | Age: 47
End: 2022-06-29
Payer: COMMERCIAL

## 2022-06-29 DIAGNOSIS — E11.9 CONTROLLED TYPE 2 DIABETES MELLITUS WITHOUT COMPLICATION, WITHOUT LONG-TERM CURRENT USE OF INSULIN (H): ICD-10-CM

## 2022-06-29 DIAGNOSIS — J32.9 CHRONIC SINUSITIS, UNSPECIFIED LOCATION: ICD-10-CM

## 2022-06-29 DIAGNOSIS — Z13.220 SCREENING FOR HYPERLIPIDEMIA: Primary | ICD-10-CM

## 2022-06-29 LAB
CD19 CELLS # BLD: 361 CELLS/UL (ref 107–698)
CD19 CELLS NFR BLD: 10 % (ref 6–27)
CD3 CELLS # BLD: 2292 CELLS/UL (ref 603–2990)
CD3 CELLS NFR BLD: 65 % (ref 49–84)
CD3+CD4+ CELLS # BLD: 1701 CELLS/UL (ref 441–2156)
CD3+CD4+ CELLS NFR BLD: 48 % (ref 28–63)
CD3+CD4+ CELLS/CD3+CD8+ CLL BLD: 3.48 % (ref 1.4–2.6)
CD3+CD8+ CELLS # BLD: 489 CELLS/UL (ref 125–1312)
CD3+CD8+ CELLS NFR BLD: 14 % (ref 10–40)
CD3-CD16+CD56+ CELLS # BLD: 879 CELLS/UL (ref 95–640)
CD3-CD16+CD56+ CELLS NFR BLD: 25 % (ref 4–25)
CHOLEST SERPL-MCNC: 259 MG/DL
CREAT UR-MCNC: 125 MG/DL
HDLC SERPL-MCNC: 69 MG/DL
LDLC SERPL CALC-MCNC: 147 MG/DL
Lab: NORMAL
MICROALBUMIN UR-MCNC: 14.5 MG/L
MICROALBUMIN/CREAT UR: 11.6 MG/G CR (ref 0–17)
NONHDLC SERPL-MCNC: 190 MG/DL
PERFORMING LABORATORY: NORMAL
SPECIMEN STATUS: NORMAL
T CELL EXTENDED COMMENT: ABNORMAL
TEST NAME: NORMAL
TRIGL SERPL-MCNC: 214 MG/DL

## 2022-06-29 PROCEDURE — 86359 T CELLS TOTAL COUNT: CPT

## 2022-06-29 PROCEDURE — 99000 SPECIMEN HANDLING OFFICE-LAB: CPT

## 2022-06-29 PROCEDURE — 86360 T CELL ABSOLUTE COUNT/RATIO: CPT

## 2022-06-29 PROCEDURE — 86357 NK CELLS TOTAL COUNT: CPT

## 2022-06-29 PROCEDURE — 86774 TETANUS ANTIBODY: CPT | Mod: 90

## 2022-06-29 PROCEDURE — 86317 IMMUNOASSAY INFECTIOUS AGENT: CPT | Mod: 90

## 2022-06-29 PROCEDURE — 36415 COLL VENOUS BLD VENIPUNCTURE: CPT

## 2022-06-29 PROCEDURE — 86355 B CELLS TOTAL COUNT: CPT

## 2022-06-29 PROCEDURE — 80061 LIPID PANEL: CPT

## 2022-06-29 PROCEDURE — 86162 COMPLEMENT TOTAL (CH50): CPT | Mod: 90

## 2022-06-29 PROCEDURE — 82043 UR ALBUMIN QUANTITATIVE: CPT

## 2022-06-30 LAB — CH50 SERPL-ACNC: >95 U/ML

## 2022-07-01 ENCOUNTER — MYC MEDICAL ADVICE (OUTPATIENT)
Dept: INTERNAL MEDICINE | Facility: CLINIC | Age: 47
End: 2022-07-01

## 2022-07-01 DIAGNOSIS — E78.5 HYPERLIPIDEMIA, UNSPECIFIED HYPERLIPIDEMIA TYPE: Primary | ICD-10-CM

## 2022-07-01 DIAGNOSIS — E11.9 CONTROLLED TYPE 2 DIABETES MELLITUS WITHOUT COMPLICATION, WITHOUT LONG-TERM CURRENT USE OF INSULIN (H): ICD-10-CM

## 2022-07-01 LAB
MISCELLANEOUS TEST 1 (ARUP): NORMAL
S PN DA SERO 19F IGG SER-MCNC: >150 MCG/ML
S PNEUM DA 1 IGG SER-MCNC: 2.4 MCG/ML
S PNEUM DA 10A IGG SER-MCNC: 2.2 MCG/ML
S PNEUM DA 11A IGG SER-MCNC: NORMAL MCG/ML
S PNEUM DA 12F IGG SER-MCNC: 4.6 MCG/ML
S PNEUM DA 14 IGG SER-MCNC: 1.3 MCG/ML
S PNEUM DA 15B IGG SER-MCNC: 5 MCG/ML
S PNEUM DA 17F IGG SER-MCNC: 3.5 MCG/ML
S PNEUM DA 18C IGG SER-MCNC: 6.3 MCG/ML
S PNEUM DA 19A IGG SER-MCNC: NORMAL MCG/ML
S PNEUM DA 2 IGG SER-MCNC: 2.6 MCG/ML
S PNEUM DA 20A IGG SER-MCNC: 2 MCG/ML
S PNEUM DA 22F IGG SER-MCNC: 5.6 MCG/ML
S PNEUM DA 23F IGG SER-MCNC: 7.9 MCG/ML
S PNEUM DA 3 IGG SER-MCNC: 1.9 MCG/ML
S PNEUM DA 33F IGG SER-MCNC: 8.1 MCG/ML
S PNEUM DA 4 IGG SER-MCNC: 1.7 MCG/ML
S PNEUM DA 5 IGG SER-MCNC: >150 MCG/ML
S PNEUM DA 6B IGG SER-MCNC: 1 MCG/ML
S PNEUM DA 7F IGG SER-MCNC: 3.4 MCG/ML
S PNEUM DA 8 IGG SER-MCNC: 0.7 MCG/ML
S PNEUM DA 9N IGG SER-MCNC: NORMAL MCG/ML
S PNEUM DA 9V IGG SER-MCNC: 2.2 MCG/ML

## 2022-07-01 NOTE — TELEPHONE ENCOUNTER
To provider to review - Patient had labs done on 6/29/22.  No provider note placed.     Please see patient's My Chart message and respond directly to patient if appropriate.     Thank you!  Alise Rodas RN, BSN  [facility]

## 2022-07-04 NOTE — RESULT ENCOUNTER NOTE
[employer] message sent:     T and B cell panel is unremarkable.  Total complement is within normal limits.  Tetanus antibody level is protective.  Pneumococcal antibody levels are 17/20 protective, which is optimal level.  Previously, immunoglobulins M, G, and A were within normal limits as well.  - No red flags for primary immunodeficiency.

## 2022-07-07 DIAGNOSIS — G47.33 OSA (OBSTRUCTIVE SLEEP APNEA): Primary | ICD-10-CM

## 2022-07-07 RX ORDER — MODAFINIL 200 MG/1
200 TABLET ORAL DAILY
Qty: 30 TABLET | Refills: 0 | Status: SHIPPED | OUTPATIENT
Start: 2022-07-07 | End: 2022-07-19

## 2022-07-07 RX ORDER — MODAFINIL 200 MG/1
200 TABLET ORAL DAILY
COMMUNITY
End: 2022-07-07

## 2022-07-07 NOTE — TELEPHONE ENCOUNTER
Pt called asking for a refill on MODAFINIL for sleepiness since he is leaving for out of town and going to be doing a lot of driving

## 2022-07-14 ENCOUNTER — TELEPHONE (OUTPATIENT)
Dept: FAMILY MEDICINE | Facility: OTHER | Age: 47
End: 2022-07-14

## 2022-07-14 DIAGNOSIS — G47.33 OSA (OBSTRUCTIVE SLEEP APNEA): ICD-10-CM

## 2022-07-14 NOTE — TELEPHONE ENCOUNTER
Reason for Call:  Other prescription    Detailed comments: Patient is changing pharmacy to:    [facility] (located in the hospital)-phone-[phone], and needs a provider's signature on the Rx so the patient can Pick it up at his new pharmacy.    Phone Number Patient can be reached at: Home number on file [phone] (home)    Best Time: any    Can we leave a detailed message on this number? YES    Call taken on 7/14/2022 at 4:29 PM by DELMA HALL

## 2022-07-15 NOTE — TELEPHONE ENCOUNTER
Routed to provider - needs provider approval first.   If patient calls back, please advise we are waiting for approval.     Alise Rodas RN, BSN  [facility]

## 2022-07-15 NOTE — TELEPHONE ENCOUNTER
Routing Rx to provider to review/advise.    Pt needing Rx sent to new pharmacy - previous pharmacy unable to transfer script.   Needing new script sent to new pharmacy.     Medication and pharmacy pended below.     Routing refill request to provider for review/approval because:  Drug not on the [facility] refill protocol     Requested Prescriptions   Pending Prescriptions Disp Refills     modafinil (PROVIGIL) 200 MG tablet 30 tablet 0     Sig: Take 1 tablet (200 mg) by mouth daily Take 1 tab every morning for sleepiness due to KRISTEL       There is no refill protocol information for this order        Thank you!  Alise Rodas RN, BSN  [facility]

## 2022-07-15 NOTE — TELEPHONE ENCOUNTER
Please address. Pt is leaving out of town and will be needing this medication.    Please send to:   [facility] [place] - [address]  [phone]

## 2022-07-19 ENCOUNTER — HOSPITAL ENCOUNTER (OUTPATIENT)
Dept: RESPIRATORY THERAPY | Facility: CLINIC | Age: 47
Discharge: HOME | End: 2022-07-19
Attending: ALLERGY & IMMUNOLOGY | Admitting: ALLERGY & IMMUNOLOGY
Payer: COMMERCIAL

## 2022-07-19 DIAGNOSIS — J45.40 MODERATE PERSISTENT ASTHMA WITHOUT COMPLICATION: ICD-10-CM

## 2022-07-19 PROCEDURE — 94060 EVALUATION OF WHEEZING: CPT

## 2022-07-19 PROCEDURE — 250N000009 HC RX 250: Performed by: ALLERGY & IMMUNOLOGY

## 2022-07-19 RX ORDER — ALBUTEROL SULFATE 0.83 MG/ML
2.5 SOLUTION RESPIRATORY (INHALATION) ONCE
Status: COMPLETED | OUTPATIENT
Start: 2022-07-19 | End: 2022-07-19

## 2022-07-19 RX ORDER — MODAFINIL 200 MG/1
200 TABLET ORAL DAILY
Qty: 30 TABLET | Refills: 0 | Status: SHIPPED | OUTPATIENT
Start: 2022-07-19 | End: 2023-08-04

## 2022-07-19 RX ADMIN — ALBUTEROL SULFATE 2.5 MG: 2.5 SOLUTION RESPIRATORY (INHALATION) at 08:05

## 2022-07-20 LAB
EXPTIME-PRE: 9.91 SEC
FEF2575-%PRED-POST: 37 %
FEF2575-%PRED-PRE: 19 %
FEF2575-POST: 1.47 L/SEC
FEF2575-PRE: 0.76 L/SEC
FEF2575-PRED: 3.89 L/SEC
FEFMAX-%PRED-PRE: 55 %
FEFMAX-PRE: 5.67 L/SEC
FEFMAX-PRED: 10.22 L/SEC
FEV1-%PRED-PRE: 51 %
FEV1-PRE: 2.15 L
FEV1FEV6-PRE: 51 %
FEV1FEV6-PRED: 81 %
FEV1FVC-PRE: 46 %
FEV1FVC-PRED: 80 %
FIFMAX-PRE: 6.72 L/SEC
FVC-%PRED-PRE: 89 %
FVC-PRE: 4.7 L
FVC-PRED: 5.28 L

## 2022-07-25 NOTE — RESULT ENCOUNTER NOTE
[facility] message sent:     Moderate obstruction with significant reversibility.  I am not a fan of treating the numbers only, but the numbers are quite low.  - I would recommend to increase the dose of Advair to 1 puff twice daily.  We will follow-up in September as I suggested previously.  After that, we may need to discuss repeating spirometry.

## 2022-08-08 ENCOUNTER — VIRTUAL VISIT (OUTPATIENT)
Dept: NUTRITION | Facility: CLINIC | Age: 47
End: 2022-08-08
Attending: NURSE PRACTITIONER
Payer: COMMERCIAL

## 2022-08-08 DIAGNOSIS — E11.9 CONTROLLED TYPE 2 DIABETES MELLITUS WITHOUT COMPLICATION, WITHOUT LONG-TERM CURRENT USE OF INSULIN (H): ICD-10-CM

## 2022-08-08 DIAGNOSIS — E78.5 HYPERLIPIDEMIA, UNSPECIFIED HYPERLIPIDEMIA TYPE: ICD-10-CM

## 2022-08-08 PROCEDURE — 97802 MEDICAL NUTRITION INDIV IN: CPT | Mod: TEL | Performed by: DIETITIAN, REGISTERED

## 2022-08-08 NOTE — PROGRESS NOTES
"Medical Nutrition Therapy  Visit Type:Initial assessment and intervention    Type of Service: Telephone Visit    Originating Location (Patient Location): Home  Distant Location (Provider Location): Home  Mode of Communication:  Telephone    Telephone Visit Start Time: 10:00  Telephone Visit End Time (telephone visit stop time): 10:45    How would patient like to obtain AVS? Jcarlos        Silviano Collins presents today for MNT and education related to type 2 diabetes and weight management.   He is accompanied by self.     ASSESSMENT:   Patient comments/concerns relating to nutrition: Juanjose states that he has been trying to cut back on portion sizes. He is wondering what he should be eating. His mother had diabetes, however she  when she was 40. He went to the gym on Saturday. He states that he believes drinking (a few beers on the weekend) and lack of exercise have caused the diabetes. Has a son that is 24 that lives with them, has unhealthy snacks.    NUTRITION HISTORY:    Breakfast: \"whatever I can find in the fridge\" - today had pineapple and healthy cheerios (whole grain), will have some coffee (sugar free creamer)  Lunch: Eats lunch at work- leftovers  Dinner: wife has \"easy night\" where she doesn't make anything- will have pizza or going out to eat or leftovers (2-3 nights/week), she does make dinner on the other nights which he states are pretty healthy. They do [employer] meals, and will have leftovers for lunch. OR last night had lo mein chicken takeout  Snacks: has a snack when he gets home from work and late night snacking, chips and and salsa. Doesn't snack during the day. Work 10-5, home at 6.   Beverages: coffee (sugar free creamer), has stopped drinking pop, bubbly water, diet green tea    Misses meals? rarely  Eats out:  2-3 meals/per week     Previous diet education:  Yes     Food allergies/intolerances: none mentioned     Diet is high in: calories, carbs and fat (saturated)  Diet is low in: " fiber, fruits and vegetables    EXERCISE: sporadic or irregular exercise    SOCIO/ECONOMIC:   Lives with: self, spouse and son    MEDICATIONS:  Current Outpatient Medications   Medication     albuterol (PROAIR HFA/PROVENTIL HFA/VENTOLIN HFA) 108 (90 Base) MCG/ACT inhaler     azelastine (ASTELIN) 0.1 % nasal spray     budesonide (PULMICORT) 0.5 MG/2ML neb solution     fluticasone (FLONASE) 50 MCG/ACT nasal spray     fluticasone-salmeterol (ADVAIR) 100-50 MCG/DOSE inhaler     loratadine-pseudoePHEDrine (CLARITIN-D 24-HOUR)  MG 24 hr tablet     modafinil (PROVIGIL) 200 MG tablet     montelukast (SINGULAIR) 10 MG tablet     MULTI-VITAMIN OR TABS     sildenafil (REVATIO) 20 MG tablet     valACYclovir (VALTREX) 500 MG tablet     No current facility-administered medications for this visit.       LABS:  Last Basic Metabolic Panel:  Lab Results   Component Value Date     06/21/2022      Lab Results   Component Value Date    POTASSIUM 4.1 06/21/2022     Lab Results   Component Value Date    CHLORIDE 103 06/21/2022     Lab Results   Component Value Date    KE 9.3 06/21/2022     Lab Results   Component Value Date    CO2 26 06/21/2022     Lab Results   Component Value Date    BUN 13 06/21/2022     Lab Results   Component Value Date    CR 0.95 06/21/2022     Lab Results   Component Value Date     06/21/2022       ANTHROPOMETRICS:  Vitals: There were no vitals taken for this visit.  There is no height or weight on file to calculate BMI.      Wt Readings from Last 5 Encounters:   06/21/22 101.6 kg (224 lb)   05/11/22 104.3 kg (230 lb)   04/28/22 104.4 kg (230 lb 1.6 oz)   12/23/21 104.3 kg (229 lb 14.4 oz)   12/06/21 101.6 kg (224 lb)       Weight Change: Juanjose feels like he has lost a few pounds    NUTRITION DIAGNOSIS: Food- and nutrition-related knowledge deficit related to limited previous nutrition education as evidenced by patient statement and new referral.    NUTRITION INTERVENTION:  Education given to  support: general nutrition guidelines, weight reduction, consistent meals, carb counting, ETOH, fat modification, exercise, dining out/special occasions, fiber, behavior modification and portion control  Education Materials Provided: My Plate Planner/Choose My Plate and Carbohydrate Counting  Motivational Interviewing    We discussed diabetes, pathophysiology, healthy eating for diabetes and weight loss. Carbohydrate counting, plate planner. Recommended continuing with exercise routine- cardio +strength.     PATIENT'S BEHAVIOR CHANGE GOALS:   See Patient Instructions for patient stated behavior change goals. AVS was printed and given to patient at today's appointment.    MONITOR / EVALUATE:  RD will monitor/evaluate:  Progress toward meeting stated nutrition-related goals  Readiness to change nutrition-related behaviors  Weight change    FOLLOW-UP:  Follow up with RD as needed.  Call RD with questions/concerns.     KAYDEN Wilkinson  Time spent in minutes: 45  Encounter: Individual

## 2022-08-08 NOTE — PATIENT INSTRUCTIONS
Snack Ideas for your Meal Plan     Protein (1 serving = 6-8 grams of protein each)  Beef Jerky ( 2 pieces)  Beef Sticks, plain (1 stick)  Cheese/Cheese Stick (1 oz)  Chicken breast (1 oz)  Cottage cheese, low fat (1/4 cup)  Crab, Imitation (2 oz)  Deli Meat (2 oz)  Edamame, boiled (1/2 cup)  Egg, hardboiled (1)  Shrimp, small (10 pieces)   Turkey Breast (1 oz)  Tuna, canned in water (1 oz)  Fairlife Milk (1/2 cup)  Yogurt, Greek : Siggis, Oikos Triple Zero, Dannon Light and Fit, etc (6 oz)    Carb (1 carb choice/serving = 15 grams)   Apple (medium)  Applesauce, unsweetened (1/2 cup)  Apricots, dried (4 whole)  Banana, medium (1/2)  Bread, 1 slice   Cantaloupe/Melon (1 cup)  Crackers 4-6 (varies by brand)  Cherries (15)  Cranberries, Dried (2 tbsp)  Dark Chocolate (1 oz)  Dates, dried (2-3 pieces)  Fruit cocktail, in own juice (1/2 cup)  Granola Bar (vary by brand)  Grapes (1/2 cup)  Ice cream, slow churned/low fat (1/2 cup)  Kiwi (~2)  Mixed Berries (1 cup)  Orange (1 medium)  Peach (1 medium)  Pear (1/2 medium)  Plums (2)  Pomegranate (1 small)  Popcorn, stove popped (2 cups)  Pretzels (3/4 oz)  Pudding, sugar free (1/2 cup)  Raisins (2 Tbsp)  Rice Cake, (2)  Skim or 1% milk (1 cup)  Tortilla Chips (1 oz)  Yogurt (greek or plain)   cup    Fat (1 serving = 100 calories)  Almonds (2 Tbsp)  Avocado (1/3 fruit OR 3 Tbsp)  Cashews (11 whole)  Cheese (1 oz)  Chocolate, dark (3/4 oz)  Coconut, unsweetened (1/3 c shredded)  Hummus (3 Tbsp)  Peanuts (20 pieces)  Peanut/Nut Butter (1 Tbsp)  Pecans (10 halves)  Pumpkin seeds, unshelled (2 Tbsp)  Salad dressing  (1-2 Tbsp)  Sunflower seeds (2 Tbsp)  Vegetable Dip (1-2 Tbsp)  Walnuts (8 halves)    Free Foods  Bell Peppers  Broccoli  Carrots   Cauliflower   Celery  Coffee, black (regular or decaf)  Cucumber  Gelatin, Sugar Free  Herbal Tea, unsweetened   Pea Pods  Pickles (high in sodium)   Popsicle, Sugar Free  Salsa (2 Tbsp)  Tomatoes    Combination Snacks  Apple + 1 Tbsp  peanut butter (carbohydrate + fat)  Handful crackers + 1 oz cheese (carbohydrate + fat or protein)  Carrot sticks + Hummus (free food + fat)  1 piece of peanut butter toast (carbohydrate + fat)  Greek yogurt + 2 Tbsp sunflower seeds (carbohydrate + fat)  Hard-boiled egg +   cup grapes (protein + carbohydrate)  1 piece of avocado toast (carbohydrate + fat)  Cucumbers + dip (free food + fat)    Marley Cannon RD [facility]

## 2022-10-26 ENCOUNTER — TELEPHONE (OUTPATIENT)
Dept: FAMILY MEDICINE | Facility: CLINIC | Age: 47
End: 2022-10-26

## 2022-10-26 NOTE — TELEPHONE ENCOUNTER
Patient Quality Outreach    Patient is due for the following:   Diabetes -  A1C, Eye Exam, Diabetic Follow-Up Visit and Foot Exam  Colon Cancer Screening      Topic Date Due     Hepatitis B Vaccine (1 of 3 - 3-dose series) Never done     COVID-19 Vaccine (4 - Booster for Pfizer series) 01/05/2022     Flu Vaccine (1) 09/01/2022       Next Steps:       Type of outreach:    Sent [employer] message.    Next Steps:  Reach out within 90 days via [employer].    Max number of attempts reached: No. Will try again in 90 days if patient still on fail list.    Questions for provider review:    None     Kamini Chamberlain MA

## 2022-11-04 DIAGNOSIS — B00.1 COLD SORE: ICD-10-CM

## 2022-11-05 RX ORDER — VALACYCLOVIR HYDROCHLORIDE 500 MG/1
500 TABLET, FILM COATED ORAL 2 TIMES DAILY
Qty: 30 TABLET | Refills: 0 | Status: SHIPPED | OUTPATIENT
Start: 2022-11-05 | End: 2023-05-11

## 2022-11-08 DIAGNOSIS — J45.20 MILD INTERMITTENT ASTHMA, UNSPECIFIED WHETHER COMPLICATED: ICD-10-CM

## 2022-11-08 RX ORDER — MONTELUKAST SODIUM 10 MG/1
10 TABLET ORAL AT BEDTIME
Qty: 90 TABLET | Refills: 1 | Status: SHIPPED | OUTPATIENT
Start: 2022-11-08 | End: 2023-05-07

## 2022-11-20 ENCOUNTER — HEALTH MAINTENANCE LETTER (OUTPATIENT)
Age: 47
End: 2022-11-20

## 2022-11-22 ENCOUNTER — TELEPHONE (OUTPATIENT)
Dept: FAMILY MEDICINE | Facility: CLINIC | Age: 47
End: 2022-11-22

## 2022-11-22 NOTE — TELEPHONE ENCOUNTER
"Call transferred to writer. Patient looking for an appt before Thanksgiving. He tested negative for Covid last night. Has shivers today, thermometer not working. Coughed all night with brown phlegm \"every now and then\". Has some shortness of breath not relieved by using his inhaler which usually helps his asthma. Body aches.   No clinic appts available today. Informed urgent care is an option, hours given. He is asking if [place] has any appts. Will transfer to central scheduling, he can ask for a virtual visit as well.  Norma IBARRA RN    "

## 2022-11-30 ENCOUNTER — E-VISIT (OUTPATIENT)
Dept: URGENT CARE | Facility: CLINIC | Age: 47
End: 2022-11-30
Payer: COMMERCIAL

## 2022-11-30 DIAGNOSIS — R06.02 SHORTNESS OF BREATH: Primary | ICD-10-CM

## 2022-11-30 PROCEDURE — 99207 PR NON-BILLABLE SERV PER CHARTING: CPT | Performed by: NURSE PRACTITIONER

## 2022-12-01 ENCOUNTER — VIRTUAL VISIT (OUTPATIENT)
Dept: FAMILY MEDICINE | Facility: CLINIC | Age: 47
End: 2022-12-01
Payer: COMMERCIAL

## 2022-12-01 DIAGNOSIS — J45.20 MILD INTERMITTENT ASTHMA, UNSPECIFIED WHETHER COMPLICATED: ICD-10-CM

## 2022-12-01 DIAGNOSIS — J45.31 MILD PERSISTENT ASTHMA WITH ACUTE EXACERBATION: ICD-10-CM

## 2022-12-01 DIAGNOSIS — R05.1 ACUTE COUGH: Primary | ICD-10-CM

## 2022-12-01 DIAGNOSIS — J30.1 ALLERGIC RHINITIS DUE TO POLLEN, UNSPECIFIED SEASONALITY: ICD-10-CM

## 2022-12-01 PROCEDURE — 99214 OFFICE O/P EST MOD 30 MIN: CPT | Mod: 95 | Performed by: FAMILY MEDICINE

## 2022-12-01 RX ORDER — BENZONATATE 100 MG/1
100 CAPSULE ORAL 3 TIMES DAILY PRN
Qty: 30 CAPSULE | Refills: 0 | Status: SHIPPED | OUTPATIENT
Start: 2022-12-01 | End: 2023-05-11

## 2022-12-01 RX ORDER — PREDNISONE 20 MG/1
20 TABLET ORAL DAILY
Qty: 5 TABLET | Refills: 0 | Status: SHIPPED | OUTPATIENT
Start: 2022-12-01 | End: 2022-12-06

## 2022-12-01 RX ORDER — AZELASTINE 1 MG/ML
1 SPRAY, METERED NASAL 2 TIMES DAILY
Qty: 30 ML | Refills: 11 | Status: SHIPPED | OUTPATIENT
Start: 2022-12-01 | End: 2023-06-26

## 2022-12-01 RX ORDER — FLUTICASONE PROPIONATE AND SALMETEROL 100; 50 UG/1; UG/1
1 POWDER RESPIRATORY (INHALATION) EVERY 12 HOURS
Qty: 60 EACH | Refills: 5 | Status: SHIPPED | OUTPATIENT
Start: 2022-12-01 | End: 2023-06-26

## 2022-12-01 NOTE — PATIENT INSTRUCTIONS
Dear Silviano Collins,    We are sorry you are not feeling well. Based on the responses you provided, you may be experiencing a serious health condition that needs immediate in-person attention. It is recommended that you be seen in the emergency room in order to better evaluate your symptoms. Please click here to find the nearest Emergency Room.     Graciela Patton, CNP

## 2022-12-01 NOTE — PROGRESS NOTES
Juanjose is a 47 year old who is being evaluated via a billable video visit.      How would you like to obtain your AVS? MyChart  If the video visit is dropped, the invitation should be resent by: Text to cell phone: [phone]  Will anyone else be joining your video visit? No          Acute cough  Acute cough unclear etiology now with likely asthma exacerbation.  Prednisone 20 mg daily x5 days with Tessalon Perles 100 mg 3 times daily as needed.  Notify persistent concerns or worsening.  - predniSONE (DELTASONE) 20 MG tablet  Dispense: 5 tablet; Refill: 0  - benzonatate (TESSALON) 100 MG capsule  Dispense: 30 capsule; Refill: 0    Mild persistent asthma with acute exacerbation  Continuing Advair 100/50 using 1 puff twice daily with use of albuterol MDI on as-needed basis.  - fluticasone-salmeterol (ADVAIR DISKUS) 100-50 MCG/ACT inhaler  Dispense: 60 each; Refill: 5    Mild intermittent asthma, unspecified whether complicated  As above.    Allergic rhinitis due to pollen, unspecified seasonality  Refill provided on Astelin nasal spray as directed.  - azelastine (ASTELIN) 0.1 % nasal spray  Dispense: 30 mL; Refill: 11         Subjective   Juanjose is a 47 year old, presenting for the following health issues:  Cough (X10 days and seems to be getting worse)      HPI     Virtual visit completed today.  Persistent cough.  Described as a dry cough over past 10 days.  Non-smoker.  Initially had fevers which resolved.  Has tried Mucinex OTC.  Patient has history of asthma.  Wife states that Medrol Dosepak is worked well for her in the past.  No recent exposure to known COVID or influenza.  Does have seasonal allergies and using cetirizine daily.  Has Singulair available as well.  Type 2 diabetes with prior A1c of 7.8% June 21, 2022.  Comprehensive review of systems as above otherwise all negative.      Review of Systems   Constitutional, HEENT, cardiovascular, pulmonary, GI, , musculoskeletal, neuro, skin, endocrine and psych  systems are negative, except as otherwise noted.      Objective           Vitals:  No vitals were obtained today due to virtual visit.    Physical Exam   GENERAL: Healthy, alert and no distress  EYES: Eyes grossly normal to inspection.  No discharge or erythema, or obvious scleral/conjunctival abnormalities.  RESP: No audible wheeze, cough, or visible cyanosis.  No visible retractions or increased work of breathing.    SKIN: Visible skin clear. No significant rash, abnormal pigmentation or lesions.  NEURO: Cranial nerves grossly intact.  Mentation and speech appropriate for age.  PSYCH: Mentation appears normal, affect normal/bright, judgement and insight intact, normal speech and appearance well-groomed.                Video-Visit Details    Video Start Time: 11:45 AM    Type of service:  Video Visit    Video End Time:11:56 AM    Originating Location (pt. Location): Home        Distant Location (provider location):  On-site    Platform used for Video Visit: [employer]

## 2022-12-09 ENCOUNTER — IMMUNIZATION (OUTPATIENT)
Dept: NURSING | Facility: CLINIC | Age: 47
End: 2022-12-09
Payer: COMMERCIAL

## 2022-12-09 PROCEDURE — 0124A COVID-19 VACCINE BIVALENT BOOSTER 12+ (PFIZER): CPT

## 2022-12-09 PROCEDURE — 91312 COVID-19 VACCINE BIVALENT BOOSTER 12+ (PFIZER): CPT

## 2023-01-02 ENCOUNTER — NURSE TRIAGE (OUTPATIENT)
Dept: NURSING | Facility: CLINIC | Age: 48
End: 2023-01-02

## 2023-01-02 NOTE — TELEPHONE ENCOUNTER
Call from patient who is asking for the number for cpap supplies in [place].    Gave patient number to the [facility] since he was last seen there to call to get the right number for him.      Joslyn Conti RN, BSN  Triage Nurse Advisor    Reason for Disposition    Information only question and nurse able to answer    Protocols used: INFORMATION ONLY CALL - NO TRIAGE-A-OH

## 2023-01-09 ENCOUNTER — TELEPHONE (OUTPATIENT)
Dept: SLEEP MEDICINE | Facility: CLINIC | Age: 48
End: 2023-01-09

## 2023-01-09 DIAGNOSIS — G47.33 OSA (OBSTRUCTIVE SLEEP APNEA): Primary | ICD-10-CM

## 2023-01-09 NOTE — TELEPHONE ENCOUNTER
Patient and left message needing new orders for CPAP Supplies.     Please advise Juan Antonio Walker PA-C, patient is scheduled on 3/14/2023 with Dr. Velazquez for CPAP Follow up. Patient stated [facility] is much closer to him.     Okay for refilling CPAP supplies until his next appointment?

## 2023-01-14 ENCOUNTER — MYC REFILL (OUTPATIENT)
Dept: INTERNAL MEDICINE | Facility: CLINIC | Age: 48
End: 2023-01-14

## 2023-01-14 DIAGNOSIS — N52.9 ERECTILE DYSFUNCTION, UNSPECIFIED ERECTILE DYSFUNCTION TYPE: ICD-10-CM

## 2023-01-16 RX ORDER — SILDENAFIL CITRATE 20 MG/1
20 TABLET ORAL 3 TIMES DAILY
Qty: 30 TABLET | Refills: 0 | Status: SHIPPED | OUTPATIENT
Start: 2023-01-16 | End: 2023-02-03

## 2023-01-16 NOTE — TELEPHONE ENCOUNTER
"Last Written Prescription Date:  6/21/2022  Last Fill Quantity: 30,  # refills: 0   Last office visit provider:  12/1/2022     Requested Prescriptions   Pending Prescriptions Disp Refills     sildenafil (REVATIO) 20 MG tablet 30 tablet 0     Sig: Take 1 tablet (20 mg) by mouth 3 times daily       Erectile Dysfuction Protocol Passed - 1/16/2023  2:47 PM        Passed - Absence of nitrates on medication list        Passed - Absence of Alpha Blockers on Med list        Passed - Recent (12 mo) or future (30 days) visit within the authorizing provider's specialty     Patient has had an office visit with the authorizing provider or a provider within the authorizing providers department within the previous 12 mos or has a future within next 30 days. See \"Patient Info\" tab in inbasket, or \"Choose Columns\" in Meds & Orders section of the refill encounter.              Passed - Medication is active on med list        Passed - Patient is age 18 or older             Prachi Lockhart RN 01/16/23 2:47 PM  "

## 2023-01-28 ENCOUNTER — TELEPHONE (OUTPATIENT)
Dept: FAMILY MEDICINE | Facility: CLINIC | Age: 48
End: 2023-01-28
Payer: COMMERCIAL

## 2023-01-28 NOTE — TELEPHONE ENCOUNTER
[facility]    Fax: [phone]    Spec: [phone]    MO: [phone]

## 2023-02-01 NOTE — TELEPHONE ENCOUNTER
Central Prior Authorization Team   Phone: [phone]    PA Initiation    Medication: Sildenafil Citrate 20mg tabs  Insurance Company: [employer] - Phone [phone] Fax [phone]  Pharmacy Filling the Rx: [facility] - [place], [place] - [address]  Filling Pharmacy Phone:    Filling Pharmacy Fax:    Start Date: 2/1/2023

## 2023-02-02 ENCOUNTER — LAB (OUTPATIENT)
Dept: FAMILY MEDICINE | Facility: CLINIC | Age: 48
End: 2023-02-02
Payer: COMMERCIAL

## 2023-02-02 DIAGNOSIS — Z20.822 SUSPECTED COVID-19 VIRUS INFECTION: ICD-10-CM

## 2023-02-02 LAB — SARS-COV-2 RNA RESP QL NAA+PROBE: POSITIVE

## 2023-02-02 PROCEDURE — U0005 INFEC AGEN DETEC AMPLI PROBE: HCPCS

## 2023-02-02 PROCEDURE — U0003 INFECTIOUS AGENT DETECTION BY NUCLEIC ACID (DNA OR RNA); SEVERE ACUTE RESPIRATORY SYNDROME CORONAVIRUS 2 (SARS-COV-2) (CORONAVIRUS DISEASE [COVID-19]), AMPLIFIED PROBE TECHNIQUE, MAKING USE OF HIGH THROUGHPUT TECHNOLOGIES AS DESCRIBED BY CMS-2020-01-R: HCPCS

## 2023-02-02 NOTE — TELEPHONE ENCOUNTER
PRIOR AUTHORIZATION DENIED    Medication: Sildenafil Citrate 20mg tabs    Denial Date: 2/2/2023    Denial Rational: Medication is only covered for PAH

## 2023-02-03 ENCOUNTER — VIRTUAL VISIT (OUTPATIENT)
Dept: FAMILY MEDICINE | Facility: CLINIC | Age: 48
End: 2023-02-03
Payer: COMMERCIAL

## 2023-02-03 ENCOUNTER — TELEPHONE (OUTPATIENT)
Dept: FAMILY MEDICINE | Facility: CLINIC | Age: 48
End: 2023-02-03

## 2023-02-03 ENCOUNTER — MYC REFILL (OUTPATIENT)
Dept: INTERNAL MEDICINE | Facility: CLINIC | Age: 48
End: 2023-02-03

## 2023-02-03 DIAGNOSIS — J45.909 UNCOMPLICATED ASTHMA, UNSPECIFIED ASTHMA SEVERITY, UNSPECIFIED WHETHER PERSISTENT: ICD-10-CM

## 2023-02-03 DIAGNOSIS — N52.9 ERECTILE DYSFUNCTION, UNSPECIFIED ERECTILE DYSFUNCTION TYPE: ICD-10-CM

## 2023-02-03 DIAGNOSIS — U07.1 INFECTION DUE TO 2019 NOVEL CORONAVIRUS: Primary | ICD-10-CM

## 2023-02-03 DIAGNOSIS — E11.9 CONTROLLED TYPE 2 DIABETES MELLITUS WITHOUT COMPLICATION, WITHOUT LONG-TERM CURRENT USE OF INSULIN (H): ICD-10-CM

## 2023-02-03 PROCEDURE — 99213 OFFICE O/P EST LOW 20 MIN: CPT | Mod: CS | Performed by: NURSE PRACTITIONER

## 2023-02-03 ASSESSMENT — ASTHMA QUESTIONNAIRES
QUESTION_2 LAST FOUR WEEKS HOW OFTEN HAVE YOU HAD SHORTNESS OF BREATH: THREE TO SIX TIMES A WEEK
QUESTION_5 LAST FOUR WEEKS HOW WOULD YOU RATE YOUR ASTHMA CONTROL: SOMEWHAT CONTROLLED
ACT_TOTALSCORE: 18
QUESTION_1 LAST FOUR WEEKS HOW MUCH OF THE TIME DID YOUR ASTHMA KEEP YOU FROM GETTING AS MUCH DONE AT WORK, SCHOOL OR AT HOME: NONE OF THE TIME
QUESTION_3 LAST FOUR WEEKS HOW OFTEN DID YOUR ASTHMA SYMPTOMS (WHEEZING, COUGHING, SHORTNESS OF BREATH, CHEST TIGHTNESS OR PAIN) WAKE YOU UP AT NIGHT OR EARLIER THAN USUAL IN THE MORNING: ONCE A WEEK
ACT_TOTALSCORE: 18
QUESTION_4 LAST FOUR WEEKS HOW OFTEN HAVE YOU USED YOUR RESCUE INHALER OR NEBULIZER MEDICATION (SUCH AS ALBUTEROL): ONCE A WEEK OR LESS

## 2023-02-03 NOTE — TELEPHONE ENCOUNTER
Called & spoke with pharmacist to update with providers message. Verbalized understanding.    Patricia Herman RN

## 2023-02-03 NOTE — TELEPHONE ENCOUNTER
COVID Positive/Requesting COVID treatment    Patient is positive for COVID and requesting treatment options.    Date of positive COVID test (PCR or at home)? 2/3/23  Current COVID symptoms: cough, fatigue, muscle or body aches, headache, sore throat and congestion or runny nose  Date COVID symptoms began: 1/30/23 cough    Message should be routed to clinic RN pool. Best phone number to use for call back: [phone]

## 2023-02-03 NOTE — PATIENT INSTRUCTIONS
"  Instructions for Patients  Your COVID-19 test was positive. This means you have the virus.     What treatments are available?  Over-the-counter medicines may help with your symptoms such as runny or stuffy nose, cough, chills, and fever. Talk to your care team about your options.     Some people are at high risk for severe illness (for example if you have a weak immune system, you're 65 or older, or you have certain medical problems). If your risk is high and your symptoms started in the last 5 to 7 days, we strongly recommend for you to get COVID treatment as soon as possible. Paxlovid and Molnupiravir are proven safe and effective, make you feel better faster, and prevent hospitalization and death.       How do I self-isolate?  You isolate when you have symptoms of COVID or a test shows you have COVID, even if you don t have symptoms.     If you DO have symptoms:  o Stay home and away from others  - For at least 5 days after your symptoms started, AND   - You are fever free for 24 hours (with no medicine that reduces fever), AND  - Your other symptoms are better.  o Wear a mask for 10 full days any time you are around others.    If you DON T have symptoms:  o Stay at home and away from others for at least 5 days after your positive test.  o Wear a mask for 10 full days any time you are around others.    You can schedule an appointment to discuss COVID treatment by requesting an appointment on [facility] by selecting \"schedule COVID-19 Treatment\" or by calling [email or web address] ([phone]).    What are the symptoms of COVID-19?  Symptoms can include fever, cough, shortness of breath, chills, headache, muscle pain sore throat, fatigue, runny or stuffy nose, and loss of taste and smell. Other less common symptoms include nausea, vomiting, or diarrhea (watery stools).    Know when to call 911. Emergency warning signs include:    Trouble breathing or shortness of breath    Pain or pressure in the chest that doesn't go " away    Feeling confused like you haven't felt before, or not being able to wake up    Bluish-colored lips or face    How can I take care of myself?  1. Get lots of rest. Drink extra fluids (unless a doctor has told you not to).  2. Take Tylenol (acetaminophen) for fever or pain. If you have liver or kidney problems, ask your family doctor if it's okay to take Tylenol   Adults:   650 mg (two 325 mg pills or tablets) every 4 to 6 hours, or...   1,000 mg (two 500 mg pills or tablets) every 8 hours as needed.  Note: Don't take more than 3,000 mg in one day. Acetaminophen is found in many medicines (both prescribed and over the counter). Read all labels to be sure you don't take too much.  For children, check the Tylenol bottle for the right dose. The dose is based on the child's age or weight.  3. Take over the counter medicines for your symptoms as needed. Talk to your pharmacist.  4. If you have other health problems (like cancer, heart failure, an organ transplant, or severe kidney disease): Call your specialty clinic if you don't feel better in the next 2 days.    Where can I get more information?     [facility] COVID-19 Resource Hub: [email or web address]     CDC Quarantine & Isolation: https://www.cdc.gov/coronavirus/2019-ncov/your-health/quarantine-isolation.html     CDC - What to Do If You're Sick: https://www.cdc.gov/coronavirus/2019-ncov/if-you-are-sick/index.html

## 2023-02-03 NOTE — PROGRESS NOTES
"Juanjose is a 47 year old who is being evaluated via a billable telephone visit.      What phone number would you like to be contacted at? [phone]  How would you like to obtain your AVS? Jcarlos    Distant Location (provider location):  On-site    Assessment & Plan     Infection due to 2019 novel coronavirus  Vaccinated, boosted. Discussed risk/benefit of antiviral treatment including risk for rebound COVID. Would like to proceed with treatment. Symptomatic care advised.  - nirmatrelvir and ritonavir (PAXLOVID) therapy pack; Take 3 tablets by mouth 2 times daily for 5 days (Take 2 Nirmatrelvir tablets and 1 Ritonavir tablet twice daily for 5 days)    Controlled type 2 diabetes mellitus without complication, without long-term current use of insulin (H)  Stable    Uncomplicated asthma, unspecified asthma severity, unspecified whether persistent  Stable.       BMI:   Estimated body mass index is 31.24 kg/m  as calculated from the following:    Height as of 6/21/22: 1.803 m (5' 11\").    Weight as of 6/21/22: 101.6 kg (224 lb).       COVID-19 positive patient.  Encounter for consideration of medication intervention. Patient does qualify for a prescription. Full discussion with patient including medication options, risks and benefits. Potential drug interactions reviewed with patient.     Treatment Planned Paxlovid, Rx sent to [place] pharmacy  [facility]   [phone] [address] [place] [address]    Hours:  Mon-Fri: 8:00a - 9:00p  Sat-Sun: 9:00a - 5:00p      Temporary change to home medications: hold sildenafil     Estimated body mass index is 31.24 kg/m  as calculated from the following:    Height as of 6/21/22: 1.803 m (5' 11\").    Weight as of 6/21/22: 101.6 kg (224 lb).  GFR Estimate   Date Value Ref Range Status   06/21/2022 >90 >60 mL/min/1.73m2 Final     Comment:     Effective December 21, 2021 eGFRcr in adults is calculated using the 2021 CKD-EPI creatinine equation which includes age and " gender (Carlos Alberto tolbert al., [place], DOI: 10.1056/TKZTqe3819792)     Lab Results   Component Value Date    [id number] Positive (A) 02/02/2023       Return in about 1 week (around 2/10/2023) for worsening or continued symptoms.    RYAN Henning CNP  [facility] DEAN Sow is a 47 year old, presenting for the following health issues:  Covid Concern      History of Present Illness       Reason for visit:  Covid pos test with diabetes  Symptom onset:  3-7 days ago  Symptoms include:  Lots  Symptom intensity:  Moderate  Symptom progression:  Worsening  Had these symptoms before:  Yes  Has tried/received treatment for these symptoms:  No  What makes it worse:  No  What makes it better:  Medication    He eats 0-1 servings of fruits and vegetables daily.He consumes 0 sweetened beverage(s) daily.He exercises with enough effort to increase his heart rate 20 to 29 minutes per day.  He exercises with enough effort to increase his heart rate 3 or less days per week. He is missing 1 dose(s) of medications per week.  He is not taking prescribed medications regularly due to remembering to take.         COVID-19 Symptom Review  How many days ago did these symptoms start? 4    Are any of the following symptoms significant for you?  New or worsening difficulty breathing? Yes    Please describe what kind of difficulty you are having breathing:Mild dyspnea (able to do ADLs without difficulty, mild shortness of breath with activities such as climbing one or two flights of stairs or walking briskly)    Worsening cough? Yes, I am coughing up mucus.    Fever or chills? Yes, I felt feverish or had chills.    Headache: YES    Sore throat: YES    Chest pain: No    Diarrhea: YES    Body aches? YES    What treatments has patient tried? Dayquil, and Nyquil helped with sleep for one night  Does patient live in a nursing home, group home, or shelter? No  Does patient have a way to get food/medications during  quarantined? Yes, I have a friend or family member who can help me.                  Review of Systems   Constitutional, HEENT, cardiovascular, pulmonary, gi and gu systems are negative, except as otherwise noted.      Objective           Vitals:  No vitals were obtained today due to virtual visit.    Physical Exam   healthy, alert and no distress  PSYCH: Alert and oriented times 3; coherent speech, normal   rate and volume, able to articulate logical thoughts, able   to abstract reason, no tangential thoughts, no hallucinations   or delusions  His affect is normal  RESP: No cough, no audible wheezing, able to talk in full sentences  Remainder of exam unable to be completed due to telephone visits                Phone call duration: 8 minutes

## 2023-02-03 NOTE — TELEPHONE ENCOUNTER
RN COVID TREATMENT VISIT  02/03/23    Silviano Collins  47 year old  Current weight? 224#    Has the patient been seen by a primary care provider at an [facility] or [facility] within the past two years? Yes.   Have you been in close proximity to/do you have a known exposure to a person with a confirmed case of influenza? No.     Date of positive COVID test (PCR or at home)?  2-2-23    Current COVID symptoms: fever or chills, cough, shortness of breath or difficulty breathing, fatigue, muscle or body aches, headache, sore throat, congestion or runny nose and diarrhea    Date COVID symptoms began: 1-30-23    Do you have any of the following conditions that place you at risk of being very sick from COVID-19? diabetes, immunocompromised and overweight (BMI>25)    Is patient eligible to continue? Yes, established patient, 12 years or older weighing at least 88.2 lbs, who has COVID symptoms that started in the past 5 days and is at risk for being very sick from COVID-19.       Have you received monoclonal antibodies or oral antiviral medications since testing positive to COVID-19? No    Are you currently hospitalized for COVID-19? No    Do you have a history of hepatitis? No    Are you currently pregnant or nursing? No    Do you have a clinically significant hypersensitivity to nirmatrelvir, ritonavir, or molnupiravir? No    Do you have any history of severe renal impairment (eGFR < 30mL/min)? No    Do you have any history of hepatic impairment or abnormalities (e.g. hepatic panel, ALT, AST, ALK Phos, bilirubin)? YES    Have you had a coronary stent placed in the previous 6 months? No    Is patient eligible to continue? No, patient does not meet all eligibility requirements for the RN COVID treatment (as denoted by yes response(s) above). Patient informed they will need a virtual provider visit to assess treatment options.  Patient will be transferred to a  at the end of this  call.     Patient is agreeable to having a virtual appointment and is scheduled with FAWN Velez today at 2:10/30.      Delma Win RN

## 2023-02-03 NOTE — TELEPHONE ENCOUNTER
"Routing refill request to provider for review/approval because:  Patient is requesting 40mg pills    Last Written Prescription Date:  1/16/2023  Last Fill Quantity: 30,  # refills: 0   Last office visit provider:  12/1/2022     Requested Prescriptions   Pending Prescriptions Disp Refills     sildenafil (REVATIO) 20 MG tablet 30 tablet 0     Sig: Take 1 tablet (20 mg) by mouth 3 times daily       Erectile Dysfuction Protocol Passed - 2/3/2023  8:10 AM        Passed - Absence of nitrates on medication list        Passed - Absence of Alpha Blockers on Med list        Passed - Recent (12 mo) or future (30 days) visit within the authorizing provider's specialty     Patient has had an office visit with the authorizing provider or a provider within the authorizing providers department within the previous 12 mos or has a future within next 30 days. See \"Patient Info\" tab in inbasket, or \"Choose Columns\" in Meds & Orders section of the refill encounter.              Passed - Medication is active on med list        Passed - Patient is age 18 or older             Dot Garcia RN 02/03/23 4:39 PM  "

## 2023-02-03 NOTE — TELEPHONE ENCOUNTER
We received an order for Paxlovid for Silviano and it is flagging an interaction with his Advair and Sildenafil.  I see in the notes it says patient is to hold the Sildenafil but do not see any plan for the Advair.    UpToDate and Paxlovid  consider the combination contraindicated due to increase risk of cardiovascular events including QT prolongation, palpitations and sinus tachycardia.    Please let us know how we should proceed.    Thank you,  Lenin Taylor PharmD - Float Pharmacist, on behalf of [facility]  [phone]

## 2023-02-04 RX ORDER — SILDENAFIL CITRATE 20 MG/1
20 TABLET ORAL 3 TIMES DAILY
Qty: 30 TABLET | Refills: 0 | Status: SHIPPED | OUTPATIENT
Start: 2023-02-04 | End: 2023-02-23

## 2023-02-07 ENCOUNTER — TELEPHONE (OUTPATIENT)
Dept: FAMILY MEDICINE | Facility: CLINIC | Age: 48
End: 2023-02-07
Payer: COMMERCIAL

## 2023-02-07 NOTE — TELEPHONE ENCOUNTER
PA Initiation    Medication: Sildenafil Citrate 20mg Tabs  Insurance Company:  [employer]  Pharmacy Filling the Rx:  [facility]  Filling Pharmacy Phone:  [phone]  Filling Pharmacy Fax:  [phone]  Start Date:  2/1/2023

## 2023-02-10 ENCOUNTER — TELEPHONE (OUTPATIENT)
Dept: FAMILY MEDICINE | Facility: CLINIC | Age: 48
End: 2023-02-10
Payer: COMMERCIAL

## 2023-02-10 NOTE — TELEPHONE ENCOUNTER
PA Initiation    Medication: Sildenafil 20 mg tablet  Insurance Company:  [facility]  Pharmacy Filling the Rx:  [facility]   Filling Pharmacy Phone:  [phone]  Filling Pharmacy Fax:  [phone]  Start Date:  2/1/2023

## 2023-02-23 ENCOUNTER — VIRTUAL VISIT (OUTPATIENT)
Dept: UROLOGY | Facility: CLINIC | Age: 48
End: 2023-02-23
Payer: COMMERCIAL

## 2023-02-23 DIAGNOSIS — F52.4 PREMATURE EJACULATION: Primary | ICD-10-CM

## 2023-02-23 DIAGNOSIS — N52.9 ERECTILE DYSFUNCTION, UNSPECIFIED ERECTILE DYSFUNCTION TYPE: ICD-10-CM

## 2023-02-23 PROCEDURE — 99204 OFFICE O/P NEW MOD 45 MIN: CPT | Mod: VID | Performed by: UROLOGY

## 2023-02-23 RX ORDER — SILDENAFIL CITRATE 20 MG/1
20 TABLET ORAL 3 TIMES DAILY
Qty: 30 TABLET | Refills: 4 | Status: SHIPPED | OUTPATIENT
Start: 2023-02-23 | End: 2024-02-14

## 2023-02-23 NOTE — PROGRESS NOTES
Juanjose is a 47 year old who is being evaluated via a billable video visit.      How would you like to obtain your AVS? MyChart  If the video visit is dropped, the invitation should be resent by: Text to cell phone: [phone]  Will anyone else be joining your video visit? No              Subjective   Juanjose is a 47 year old, presenting for the following health issues:  Video Visit      HPI     Patient is a 47-year-old male who is seen for a consultation with regard to patient's sexual dysfunction.  He complains of erectile dysfunction for a couple of years.  He uses Viagra with good results.  His risk factors are diabetes and remote history of smoking in the past.  The other issue he has is premature ejaculation.  This has been a lifelong problem.  He cannot maintain it for more than several minutes.    Review of Systems   Constitutional, HEENT, cardiovascular, pulmonary, GI, , musculoskeletal, neuro, skin, endocrine and psych systems are negative, except as otherwise noted.      Objective           Vitals:  No vitals were obtained today due to virtual visit.    Physical Exam   GENERAL: Healthy, alert and no distress  EYES: Eyes grossly normal to inspection.  No discharge or erythema, or obvious scleral/conjunctival abnormalities.  RESP: No audible wheeze, cough, or visible cyanosis.  No visible retractions or increased work of breathing.    SKIN: Visible skin clear. No significant rash, abnormal pigmentation or lesions.  NEURO: Cranial nerves grossly intact.  Mentation and speech appropriate for age.  PSYCH: Mentation appears normal, affect normal/bright, judgement and insight intact, normal speech and appearance well-groomed.    #1 erectile dysfunction: Continue with Viagra as needed.    #2 premature ejaculation: Treatment options discussed.  We discussed antidepression before sex, numbing agent for penile plants, psychosexual counseling.  Zoloft 50 mg to be taken 8 to 12 hours before sex prescribed.  If patient  wants to take it on a daily basis, this can be prescribed through his primary care MD.            Video-Visit Details    Type of service:  Video Visit     Originating Location (pt. Location): Home    Distant Location (provider location):  Off-site  Platform [facility] Visit[facility]

## 2023-03-13 ASSESSMENT — SLEEP AND FATIGUE QUESTIONNAIRES
HOW LIKELY ARE YOU TO NOD OFF OR FALL ASLEEP WHILE WATCHING TV: HIGH CHANCE OF DOZING
HOW LIKELY ARE YOU TO NOD OFF OR FALL ASLEEP WHILE SITTING INACTIVE IN A PUBLIC PLACE: WOULD NEVER DOZE
HOW LIKELY ARE YOU TO NOD OFF OR FALL ASLEEP WHILE SITTING AND TALKING TO SOMEONE: WOULD NEVER DOZE
HOW LIKELY ARE YOU TO NOD OFF OR FALL ASLEEP WHILE LYING DOWN TO REST IN THE AFTERNOON WHEN CIRCUMSTANCES PERMIT: SLIGHT CHANCE OF DOZING
HOW LIKELY ARE YOU TO NOD OFF OR FALL ASLEEP WHILE SITTING AND READING: SLIGHT CHANCE OF DOZING
HOW LIKELY ARE YOU TO NOD OFF OR FALL ASLEEP IN A CAR, WHILE STOPPED FOR A FEW MINUTES IN TRAFFIC: SLIGHT CHANCE OF DOZING
HOW LIKELY ARE YOU TO NOD OFF OR FALL ASLEEP WHEN YOU ARE A PASSENGER IN A CAR FOR AN HOUR WITHOUT A BREAK: MODERATE CHANCE OF DOZING
HOW LIKELY ARE YOU TO NOD OFF OR FALL ASLEEP WHILE SITTING QUIETLY AFTER LUNCH WITHOUT ALCOHOL: SLIGHT CHANCE OF DOZING

## 2023-03-14 ENCOUNTER — OFFICE VISIT (OUTPATIENT)
Dept: SLEEP MEDICINE | Facility: CLINIC | Age: 48
End: 2023-03-14
Payer: COMMERCIAL

## 2023-03-14 ENCOUNTER — MYC MEDICAL ADVICE (OUTPATIENT)
Dept: SLEEP MEDICINE | Facility: CLINIC | Age: 48
End: 2023-03-14

## 2023-03-14 VITALS
DIASTOLIC BLOOD PRESSURE: 89 MMHG | SYSTOLIC BLOOD PRESSURE: 145 MMHG | HEART RATE: 85 BPM | BODY MASS INDEX: 32.23 KG/M2 | WEIGHT: 230.2 LBS | OXYGEN SATURATION: 98 % | HEIGHT: 71 IN

## 2023-03-14 DIAGNOSIS — G47.33 OBSTRUCTIVE SLEEP APNEA: Primary | ICD-10-CM

## 2023-03-14 DIAGNOSIS — G47.10 HYPERSOMNIA: ICD-10-CM

## 2023-03-14 DIAGNOSIS — Z53.9 ERRONEOUS ENCOUNTER--DISREGARD: Primary | ICD-10-CM

## 2023-03-14 PROCEDURE — 99214 OFFICE O/P EST MOD 30 MIN: CPT | Performed by: INTERNAL MEDICINE

## 2023-03-14 NOTE — NURSING NOTE
"Chief Complaint   Patient presents with     CPAP Follow Up       Initial BP (!) 145/89   Pulse 85   Ht 1.803 m (5' 11\")   Wt 104.4 kg (230 lb 3.2 oz)   SpO2 98%   BMI 32.11 kg/m   Estimated body mass index is 32.11 kg/m  as calculated from the following:    Height as of this encounter: 1.803 m (5' 11\").    Weight as of this encounter: 104.4 kg (230 lb 3.2 oz).    Medication Reconciliation: complete    Neck circumference:     DME: [facility]    Future MyChart message sent reminding patient of 2 year follow up appointment.     Nathalie Sharp MA           "

## 2023-03-14 NOTE — PROGRESS NOTES
Additional 15 minutes on the date of service was spent performing the following:    -Preparing to see the patient  -Obtaining and/or reviewing separately obtained history   -Ordering medications, tests, or procedures   -Documenting clinical information in the electronic or other health record     Thank you for the opportunity to participate in the care of Silviano Collins.     He is a 47 year old y/o male patient who comes to the sleep medicine clinic for follow up.  The patient was diagnosed with obstructive sleep apnea on 08/17/2012 (AHI = 39.2).  The patient admits that he has not been using his CPAP machine as much as he should.  He would like to establish care with me so he can get a prescription to get supplies.  He is also interested in having me take over the prescription for modafinil.  This medication was prescribed by another provider.  He does not have a diagnosis of narcolepsy.  He only takes in the modafinil as needed in case of drowsy driving.     Assessment and Plan:  In summary Silviano Collins is a 47 year old year old male who is here for follow up.    1. Obstructive sleep apnea/Hypersomnia  I encouraged the patient to increase his hours of usage of CPAP and so far as he can.  I will also write a prescription for him to continue to get supplies.  I informed him about my hesitancy to initiate modafinil because his blood pressure is elevated.  He is currently not on any antihypertensive medications.  Strongly recommend that he gets evaluated by his primary care provider to see if he needs to be initiated on blood pressure medications.  After his blood pressure is optimized, I may consider prescribing modafinil on an as-needed basis.  I also explained to him the relationship between untreated obstructive sleep apnea and elevated blood pressure.  The patient expressed understanding and agreed. RTC in 2 years.  - COMPREHENSIVE DME     Compliance Download data for 30 days:  Pressure setting: APAP  12-20 CWP  95% pressure: 13.4 CWP  Residual AHI: 2.1 events per hour  Leak: Minimal  Compliance: 56.7%  Mask Tolerance: Good  Skin irritation: None  DME: [facility]    Lab reviewed: Discussed with patient.    Cpap Fu Template    Question 3/13/2023  9:43 PM CDT - Filed by Patient   Do you use a CPAP Machine at home? Yes   Overall, on a scale of 0-10 how would you rate your CPAP? 5   Is your mask comfortable? Yes   Is your mask leaking? Yes   If yes, where do you feel it? Sides   How many nights per week does the mask leak? 5   Do you notice snoring with mask on? No   Do you notice gasping arousals with mask on? Yes   Are you having significant oral or nasal dryness? Yes   Is the pressure setting comfortable? Yes   What type of mask do you use?    What is your typical bedtime? 10   How long does it take you to go to sleep on PAP therapy? 10 min   What time do you typically get out of bed for the day? 7   How many hours on average per night are you using PAP therapy? 9   How many hours are you sleeping per night? 7   Do you feel well rested in the morning? No         CINTHYA:  CINTHYA Total Score: 10  Total score - [place]: 9 (3/13/2023  9:44 PM)        Patient Active Problem List   Diagnosis     Allergic rhinitis due to other allergen     Mild intermittent asthma     Controlled type 2 diabetes mellitus without complication, without long-term current use of insulin (H)     Hyperlipidemia     Obesity     KRISTEL (obstructive sleep apnea)     Asthma     Vitamin D deficiency       Past Medical History:   Diagnosis Date     Allergic rhinitis due to other allergen     seasonal allergies (spring and fall)     Mild intermittent asthma     exercise, seasonal, associated with URIs       Past Surgical History:   Procedure Laterality Date     HC TOOTH EXTRACTION W/FORCEP  1995    4 wisdom teeth removed withotu complications       Current Outpatient Medications   Medication Sig Dispense Refill     albuterol (PROAIR HFA/PROVENTIL  "HFA/VENTOLIN HFA) 108 (90 Base) MCG/ACT inhaler Inhale 2 puffs into the lungs every 6 hours 36 g 3     azelastine (ASTELIN) 0.1 % nasal spray Spray 1 spray into both nostrils 2 times daily 30 mL 11     budesonide (PULMICORT) 0.5 MG/2ML neb solution Add one ampule into [facility] sinus rinse bottle with saline mixture, and irrigate daily 60 mL 11     fluticasone (FLONASE) 50 MCG/ACT nasal spray Spray 1 spray into both nostrils daily 16 g 3     fluticasone-salmeterol (ADVAIR DISKUS) 100-50 MCG/ACT inhaler Inhale 1 puff into the lungs every 12 hours 60 each 5     loratadine-pseudoePHEDrine (CLARITIN-D 24-HOUR)  MG 24 hr tablet Take 1 tablet by mouth daily 30 tablet 1     modafinil (PROVIGIL) 200 MG tablet Take 1 tablet (200 mg) by mouth daily Take 1 tab every morning for sleepiness due to KRISTEL 30 tablet 0     montelukast (SINGULAIR) 10 MG tablet Take 1 tablet (10 mg) by mouth At Bedtime 90 tablet 1     MULTI-VITAMIN OR TABS        sertraline (ZOLOFT) 50 MG tablet Take 1 tablet (50 mg) by mouth daily as needed (sex) Take one tablet 8-12 hours before sex 30 tablet 1     sildenafil (REVATIO) 20 MG tablet Take 1 tablet (20 mg) by mouth 3 times daily 30 tablet 4     valACYclovir (VALTREX) 500 MG tablet Take 1 tablet (500 mg) by mouth 2 times daily 30 tablet 0     benzonatate (TESSALON) 100 MG capsule Take 1 capsule (100 mg) by mouth 3 times daily as needed for cough (Patient not taking: Reported on 2/3/2023) 30 capsule 0       Allergies   Allergen Reactions     Gramineae Pollens Other (See Comments)     Other reaction(s): Runny Nose     Physical Exam:  BP (!) 145/89   Pulse 85   Ht 1.803 m (5' 11\")   Wt 104.4 kg (230 lb 3.2 oz)   SpO2 98%   BMI 32.11 kg/m    BMI:Body mass index is 32.11 kg/m .   GEN: NAD,   Head: Normocephalic.  EYES: EOMI  Psych: normal mood, normal affect    Labs/Studies:      No results found for: PH, PHARTERIAL, PO2, XD1YNIHACAO, SAT, PCO2, HCO3, BASEEXCESS, SEAN, BEB  Lab Results   Component " Value Date    TSH 2.20 12/06/2010     Lab Results   Component Value Date     (H) 06/21/2022     Lab Results   Component Value Date    HGB 15.7 06/21/2022    HGB 15.2 04/09/2008     Lab Results   Component Value Date    BUN 13 06/21/2022    CR 0.95 06/21/2022     No results found for: AST, ALT, GGT, ALKPHOS, BILITOTAL, BILICONJ, BILIDIRECT, DYLAN  No results found for: UAMP, UBARB, BENZODIAZEUR, UCANN, UCOC, OPIT, UPCP    Recent Labs   Lab Test 06/21/22  1326      POTASSIUM 4.1   CHLORIDE 103   CO2 26   ANIONGAP 11   *   BUN 13   CR 0.95   KE 9.3       Ferritin   Date Value Ref Range Status   12/06/2010 319 (H) 20 - 300 ng/mL Final       I reviewed the efficacy and compliance report from his device. Data summarized on the HPI and the PAP compliance flow sheet.     Patient verbalized understanding of these issues, agrees with the plan and all questions were answered today. Patient was given an opportuntity to voice any other symptoms or concerns not listed above. Patient did not have any other symptoms or concerns.      Kam Velazquez DO  Board Certified in Internal Medicine and Sleep Medicine    (Note created with Dragon voice recognition and unintended spelling errors and word substitutions may occur)     Audio and visual devices were used for this virtual clinic visit with permission from patient.

## 2023-04-15 ENCOUNTER — HEALTH MAINTENANCE LETTER (OUTPATIENT)
Age: 48
End: 2023-04-15

## 2023-05-05 ENCOUNTER — VIRTUAL VISIT (OUTPATIENT)
Dept: INTERNAL MEDICINE | Facility: CLINIC | Age: 48
End: 2023-05-05
Payer: COMMERCIAL

## 2023-05-05 ENCOUNTER — TELEPHONE (OUTPATIENT)
Dept: INTERNAL MEDICINE | Facility: CLINIC | Age: 48
End: 2023-05-05

## 2023-05-05 DIAGNOSIS — J45.20 MILD INTERMITTENT ASTHMA, UNSPECIFIED WHETHER COMPLICATED: ICD-10-CM

## 2023-05-05 DIAGNOSIS — J30.1 ALLERGIC RHINITIS DUE TO POLLEN, UNSPECIFIED SEASONALITY: Primary | ICD-10-CM

## 2023-05-05 DIAGNOSIS — R09.81 CONGESTION OF PARANASAL SINUS: ICD-10-CM

## 2023-05-05 DIAGNOSIS — J45.20 MILD INTERMITTENT ASTHMA WITHOUT COMPLICATION: Chronic | ICD-10-CM

## 2023-05-05 PROCEDURE — 99213 OFFICE O/P EST LOW 20 MIN: CPT | Mod: VID | Performed by: INTERNAL MEDICINE

## 2023-05-05 NOTE — PROGRESS NOTES
"Juanjose is a 47 year old who is being evaluated via a billable video visit.      How would you like to obtain your AVS? MyChart  If the video visit is dropped, the invitation should be resent by: Text to cell phone: [phone]  Will anyone else be joining your video visit? No          Assessment & Plan     Allergic rhinitis due to pollen, unspecified seasonality  Mild intermittent asthma without complication  Congestion of paranasal sinus    Symptoms spectra most likely secondary to allergic rhinitis.  Discussed with the patient on trial of combination antihistamine medication and decongestant.   - loratadine-pseudoePHEDrine (CLARITIN-D 24-HOUR)  MG 24 hr tablet; Take 1 tablet by mouth daily             BMI:   Estimated body mass index is 32.11 kg/m  as calculated from the following:    Height as of 3/14/23: 1.803 m (5' 11\").    Weight as of 3/14/23: 104.4 kg (230 lb 3.2 oz).           Claudia Hay MD  [facility]    Jose Guadalupe Sow is a 47 year old, presenting for the following health issues:  No chief complaint on file.        4/28/2022     1:42 PM   Additional Questions   Roomed by NL, CMA     HPI     Allergic reaction:most likely 2/2 to pollen. Has tried antihistamine pills. Runny nose and itchy/watery eyes for almost 1-1/2 weeks. No fevers/chills. No cough.no SOB or chest pain. No myalgias.   Patient has had a past medical history of similar symptoms before which have responded to IM steroid injection.            Review of Systems   Constitutional, HEENT, cardiovascular, pulmonary, gi and gu systems are negative, except as otherwise noted.      Objective           Vitals:  No vitals were obtained today due to virtual visit.    Physical Exam   Not done as this was a phone visit.                phone-Visit Details    Type of service:  Phone Visit     Originating Location (pt. Location): Home    Distant Location (provider location):  On-site  Platform used for Phone Visit: " Doximity

## 2023-05-05 NOTE — TELEPHONE ENCOUNTER
Patient calling.  States he would like the Claritin D prescription to go to a different pharmacy - [facility] instead of [facility] pharmacy.    Prescription pended.    Please advise, thanks.

## 2023-05-07 RX ORDER — MONTELUKAST SODIUM 10 MG/1
10 TABLET ORAL AT BEDTIME
Qty: 90 TABLET | Refills: 1 | Status: SHIPPED | OUTPATIENT
Start: 2023-05-07 | End: 2023-10-10

## 2023-05-07 RX ORDER — FLUTICASONE PROPIONATE 50 MCG
1 SPRAY, SUSPENSION (ML) NASAL DAILY
Qty: 16 G | Refills: 3 | Status: SHIPPED | OUTPATIENT
Start: 2023-05-07 | End: 2023-06-26

## 2023-05-09 ENCOUNTER — TELEPHONE (OUTPATIENT)
Dept: FAMILY MEDICINE | Facility: CLINIC | Age: 48
End: 2023-05-09

## 2023-05-09 NOTE — TELEPHONE ENCOUNTER
General Call      Reason for Call: steroid injection    What are your questions or concerns:  Patient was told by allergist to schedule with pcp for a steroid injection for allergies. Patient is scheduled but wants to make sure this can be done.    Date of last appointment with provider: 2-3-23    Could we send this information to you in [employer] or would you prefer to receive a phone call?:   No preference   Okay to leave a detailed message?: Yes at Home number on file [phone] (home)

## 2023-05-10 NOTE — TELEPHONE ENCOUNTER
Pt returned call. States received message from new ENT Dr Champagne in [facility] (5/1) that tells pt that although previous provider gave injections, he does not and would need to go to primary to request. Pt states injection is for allergies that cause his eyes to be itchy and watery. He and his wife get a shot every year. I did ask pt if ever did oral steroids and pt states he hasnt but if it is effective he would be willing to try.     Acosta Hawkins, RN

## 2023-05-10 NOTE — TELEPHONE ENCOUNTER
He needs to schedule with ENT, looks like Dr Khan gave him injection last time    RYAN Crawford CNP

## 2023-05-11 ENCOUNTER — OFFICE VISIT (OUTPATIENT)
Dept: FAMILY MEDICINE | Facility: CLINIC | Age: 48
End: 2023-05-11
Payer: COMMERCIAL

## 2023-05-11 VITALS
OXYGEN SATURATION: 96 % | HEART RATE: 80 BPM | TEMPERATURE: 96.3 F | HEIGHT: 71 IN | WEIGHT: 229 LBS | BODY MASS INDEX: 32.06 KG/M2 | SYSTOLIC BLOOD PRESSURE: 128 MMHG | RESPIRATION RATE: 16 BRPM | DIASTOLIC BLOOD PRESSURE: 82 MMHG

## 2023-05-11 DIAGNOSIS — H10.13 ALLERGIC CONJUNCTIVITIS, BILATERAL: ICD-10-CM

## 2023-05-11 DIAGNOSIS — B00.1 COLD SORE: ICD-10-CM

## 2023-05-11 DIAGNOSIS — Z12.11 SCREEN FOR COLON CANCER: ICD-10-CM

## 2023-05-11 DIAGNOSIS — J30.1 SEASONAL ALLERGIC RHINITIS DUE TO POLLEN: Primary | ICD-10-CM

## 2023-05-11 DIAGNOSIS — E11.9 TYPE 2 DIABETES MELLITUS WITHOUT COMPLICATION, WITHOUT LONG-TERM CURRENT USE OF INSULIN (H): ICD-10-CM

## 2023-05-11 LAB
ANION GAP SERPL CALCULATED.3IONS-SCNC: 9 MMOL/L (ref 7–15)
BUN SERPL-MCNC: 10.5 MG/DL (ref 6–20)
CALCIUM SERPL-MCNC: 9 MG/DL (ref 8.6–10)
CHLORIDE SERPL-SCNC: 103 MMOL/L (ref 98–107)
CREAT SERPL-MCNC: 0.8 MG/DL (ref 0.67–1.17)
DEPRECATED HCO3 PLAS-SCNC: 26 MMOL/L (ref 22–29)
GFR SERPL CREATININE-BSD FRML MDRD: >90 ML/MIN/1.73M2
GLUCOSE SERPL-MCNC: 185 MG/DL (ref 70–99)
HBA1C MFR BLD: 8 % (ref 0–5.6)
POTASSIUM SERPL-SCNC: 4.2 MMOL/L (ref 3.4–5.3)
SODIUM SERPL-SCNC: 138 MMOL/L (ref 136–145)

## 2023-05-11 PROCEDURE — 80048 BASIC METABOLIC PNL TOTAL CA: CPT | Performed by: NURSE PRACTITIONER

## 2023-05-11 PROCEDURE — 96372 THER/PROPH/DIAG INJ SC/IM: CPT | Performed by: NURSE PRACTITIONER

## 2023-05-11 PROCEDURE — 99214 OFFICE O/P EST MOD 30 MIN: CPT | Mod: 25 | Performed by: NURSE PRACTITIONER

## 2023-05-11 PROCEDURE — 83036 HEMOGLOBIN GLYCOSYLATED A1C: CPT | Performed by: NURSE PRACTITIONER

## 2023-05-11 PROCEDURE — 36415 COLL VENOUS BLD VENIPUNCTURE: CPT | Performed by: NURSE PRACTITIONER

## 2023-05-11 RX ORDER — MODAFINIL 200 MG/1
200 TABLET ORAL DAILY
Qty: 30 TABLET | Refills: 0 | Status: CANCELLED | OUTPATIENT
Start: 2023-05-11

## 2023-05-11 RX ORDER — VALACYCLOVIR HYDROCHLORIDE 500 MG/1
500 TABLET, FILM COATED ORAL 2 TIMES DAILY
Qty: 30 TABLET | Refills: 0 | Status: CANCELLED | OUTPATIENT
Start: 2023-05-11

## 2023-05-11 RX ORDER — VALACYCLOVIR HYDROCHLORIDE 500 MG/1
500 TABLET, FILM COATED ORAL 2 TIMES DAILY
Qty: 30 TABLET | Refills: 1 | Status: SHIPPED | OUTPATIENT
Start: 2023-05-11 | End: 2024-01-24

## 2023-05-11 RX ORDER — GLUCOSAMINE HCL/CHONDROITIN SU 500-400 MG
CAPSULE ORAL
Qty: 100 EACH | Refills: 0 | Status: SHIPPED | OUTPATIENT
Start: 2023-05-11

## 2023-05-11 RX ORDER — LANCETS
EACH MISCELLANEOUS
Qty: 100 EACH | Refills: 0 | Status: SHIPPED | OUTPATIENT
Start: 2023-05-11

## 2023-05-11 RX ORDER — METHYLPREDNISOLONE SODIUM SUCCINATE 40 MG/ML
40 INJECTION, POWDER, LYOPHILIZED, FOR SOLUTION INTRAMUSCULAR; INTRAVENOUS ONCE
Status: DISCONTINUED | OUTPATIENT
Start: 2023-05-11 | End: 2023-05-11

## 2023-05-11 RX ORDER — AZELASTINE HYDROCHLORIDE 0.5 MG/ML
1 SOLUTION/ DROPS OPHTHALMIC 2 TIMES DAILY
Qty: 6 ML | Refills: 3 | Status: SHIPPED | OUTPATIENT
Start: 2023-05-11 | End: 2023-06-26

## 2023-05-11 RX ORDER — METHYLPREDNISOLONE ACETATE 40 MG/ML
40 INJECTION, SUSPENSION INTRA-ARTICULAR; INTRALESIONAL; INTRAMUSCULAR; SOFT TISSUE ONCE
Status: COMPLETED | OUTPATIENT
Start: 2023-05-11 | End: 2023-05-11

## 2023-05-11 RX ORDER — METFORMIN HCL 500 MG
500 TABLET, EXTENDED RELEASE 24 HR ORAL 2 TIMES DAILY WITH MEALS
Qty: 60 TABLET | Refills: 2 | Status: SHIPPED | OUTPATIENT
Start: 2023-05-11 | End: 2023-12-27

## 2023-05-11 RX ADMIN — METHYLPREDNISOLONE ACETATE 40 MG: 40 INJECTION, SUSPENSION INTRA-ARTICULAR; INTRALESIONAL; INTRAMUSCULAR; SOFT TISSUE at 07:50

## 2023-05-11 ASSESSMENT — PAIN SCALES - GENERAL: PAINLEVEL: NO PAIN (0)

## 2023-05-11 NOTE — PROGRESS NOTES
Assessment & Plan     Seasonal allergic rhinitis due to pollen  -discussed possible side effects of steroid injection, advised patient that it might raise blood sugars for 1-2 weeks, recommended to monitor blood sugars at home. Advised patient that injection typically can last for 2-3 weeks, if he continue to have symptoms I advised him to follow up with allergist   - methylPREDNISolone (DEPO-MEDROL) injection 40 mg    Cold sore    - valACYclovir (VALTREX) 500 MG tablet; Take 1 tablet (500 mg) by mouth 2 times daily    Screen for colon cancer    - Fecal colorectal cancer screen (FIT); Future    Allergic conjunctivitis, bilateral    - azelastine (OPTIVAR) 0.05 % ophthalmic solution; Place 1 drop into both eyes 2 times daily  - methylPREDNISolone (DEPO-MEDROL) injection 40 mg    Type 2 diabetes mellitus without complication, without long-term current use of insulin (H)    - Hemoglobin A1c; Future  - Basic metabolic panel  (Ca, Cl, CO2, Creat, Gluc, K, Na, BUN); Future  - Basic metabolic panel  (Ca, Cl, CO2, Creat, Gluc, K, Na, BUN)  - Hemoglobin A1c  - PRIMARY CARE FOLLOW-UP SCHEDULING; Future  - metFORMIN (GLUCOPHAGE XR) 500 MG 24 hr tablet; Take 1 tablet (500 mg) by mouth 2 times daily (with meals)  - blood glucose monitoring (NO BRAND SPECIFIED) meter device kit; Use to test blood sugar one time daily or as directed. Preferred blood glucose meter OR supplies to accompany: Blood Glucose Monitor Brands: per insurance.  - blood glucose (NO BRAND SPECIFIED) test strip; Use to test blood sugar once daily or as directed. To accompany: Blood Glucose Monitor Brands: per insurance.  - blood glucose calibration (NO BRAND SPECIFIED) solution; To accompany: Blood Glucose Monitor Brands: per insurance.  - thin (NO BRAND SPECIFIED) lancets; Use with lanceting device. To accompany: Blood Glucose Monitor Brands: per insurance.  - alcohol swab prep pads; Use to swab area of injection/dominick as directed.  -follow up with PCP in 2-3  "months, or sooner if home blood sugars checks over 180 consistently         RYAN Crawford CNP  [facility]    Jose Guadalupe Sow is a 47 year old, presenting for the following health issues:  Allergies (Would like to discuss getting a steroid shot for allergies)        5/11/2023     7:06 AM   Additional Questions   Roomed by Lory         5/11/2023     7:06 AM   Patient Reported Additional Medications   Patient reports taking the following new medications none     HPI           Review of Systems   Constitutional, HEENT, cardiovascular, pulmonary, gi and gu systems are negative, except as otherwise noted.      Objective    /82 (BP Location: Left arm, Patient Position: Sitting, Cuff Size: Adult Large)   Pulse 80   Temp (!) 96.3  F (35.7  C) (Tympanic)   Resp 16   Ht 1.803 m (5' 11\")   Wt 103.9 kg (229 lb)   SpO2 96%   BMI 31.94 kg/m    Body mass index is 31.94 kg/m .  Physical Exam   GENERAL: healthy, alert and no distress  EYES: Eyes grossly normal to inspection, PERRL and conjunctivae and sclerae normal  SKIN: no suspicious lesions or rashes  NEURO: Normal strength and tone, mentation intact and speech normal  PSYCH: mentation appears normal, affect normal/bright    Results for orders placed or performed in visit on 05/11/23 (from the past 24 hour(s))   Basic metabolic panel  (Ca, Cl, CO2, Creat, Gluc, K, Na, BUN)   Result Value Ref Range    Sodium 138 136 - 145 mmol/L    Potassium 4.2 3.4 - 5.3 mmol/L    Chloride 103 98 - 107 mmol/L    Carbon Dioxide (CO2) 26 22 - 29 mmol/L    Anion Gap 9 7 - 15 mmol/L    Urea Nitrogen 10.5 6.0 - 20.0 mg/dL    Creatinine 0.80 0.67 - 1.17 mg/dL    Calcium 9.0 8.6 - 10.0 mg/dL    Glucose 185 (H) 70 - 99 mg/dL    GFR Estimate >90 >60 mL/min/1.73m2   Hemoglobin A1c   Result Value Ref Range    Hemoglobin A1C 8.0 (H) 0.0 - 5.6 %             "

## 2023-05-11 NOTE — NURSING NOTE
Clinic Administered Medication Documentation        Patient was given Methyiprednisolone. Prior to medication administration, verified patient's identity using patient s name and date of birth. Please see MAR and medication order for additional information. Patient instructed to remain in clinic for 15 minutes and report any adverse reaction to staff immediately.    Vial/Syringe: Multi dose vial

## 2023-05-12 ENCOUNTER — MYC MEDICAL ADVICE (OUTPATIENT)
Dept: FAMILY MEDICINE | Facility: CLINIC | Age: 48
End: 2023-05-12

## 2023-05-12 DIAGNOSIS — E11.65 UNCONTROLLED TYPE 2 DIABETES MELLITUS WITH HYPERGLYCEMIA (H): Primary | ICD-10-CM

## 2023-05-16 RX ORDER — PROCHLORPERAZINE 25 MG/1
SUPPOSITORY RECTAL
Qty: 1 EACH | Refills: 1 | Status: SHIPPED | OUTPATIENT
Start: 2023-05-16 | End: 2024-04-24

## 2023-05-16 RX ORDER — PROCHLORPERAZINE 25 MG/1
SUPPOSITORY RECTAL
Qty: 1 EACH | Refills: 0 | Status: SHIPPED | OUTPATIENT
Start: 2023-05-16

## 2023-05-16 RX ORDER — PROCHLORPERAZINE 25 MG/1
SUPPOSITORY RECTAL
Qty: 3 EACH | Refills: 5 | Status: SHIPPED | OUTPATIENT
Start: 2023-05-16 | End: 2024-04-24

## 2023-05-16 NOTE — TELEPHONE ENCOUNTER
No, I am not patient's PCP. I sent Dexcom, recommend follow up with PCP in 2-3 months to recheck diabetes.    RYAN Crawford CNP

## 2023-05-17 ENCOUNTER — TELEPHONE (OUTPATIENT)
Dept: FAMILY MEDICINE | Facility: CLINIC | Age: 48
End: 2023-05-17

## 2023-05-17 NOTE — TELEPHONE ENCOUNTER
Insurance requires a prior authorization for dexcom g6  AND sensors AND transmitter     [employer]  ID# [id number]     [phone]     Thanks!     Portia Spencer [facility]  [phone]

## 2023-05-17 NOTE — TELEPHONE ENCOUNTER
Insurance requires a prior authorization for dexcom g6  AND sensors.    [employer]  ID# [id number]    [phone]    Thanks!    Portia Spencer [facility]  [phone]

## 2023-05-22 ENCOUNTER — PATIENT OUTREACH (OUTPATIENT)
Dept: CARE COORDINATION | Facility: CLINIC | Age: 48
End: 2023-05-22
Payer: COMMERCIAL

## 2023-05-23 NOTE — TELEPHONE ENCOUNTER
Prior Authorization Approval    Medication: DEXCOM G6 SENSOR MISC  Authorization Effective Date: 5/23/2023  Authorization Expiration Date: 5/22/2024  Approved Dose/Quantity:   Reference #: [id number]   Insurance Company: [employer] - Phone [phone] Fax [phone]  Which Pharmacy is filling the prescription: [facility] [address]  Pharmacy Notified: Yes  Patient Notified: Yes

## 2023-05-23 NOTE — TELEPHONE ENCOUNTER
PA Initiation    Medication: DEXCOM G6 SENSOR MISC  Insurance Company: [employer] - Phone [phone] Fax [phone]  Pharmacy Filling the Rx: [facility] [place], [place] - [address] [place] [address]  Filling Pharmacy Phone: [phone]  Filling Pharmacy Fax: [phone]  Start Date: 5/23/2023

## 2023-06-06 ENCOUNTER — PATIENT OUTREACH (OUTPATIENT)
Dept: CARE COORDINATION | Facility: CLINIC | Age: 48
End: 2023-06-06
Payer: COMMERCIAL

## 2023-06-26 ENCOUNTER — OFFICE VISIT (OUTPATIENT)
Dept: ALLERGY | Facility: CLINIC | Age: 48
End: 2023-06-26
Payer: COMMERCIAL

## 2023-06-26 VITALS
HEIGHT: 71 IN | TEMPERATURE: 95.1 F | BODY MASS INDEX: 31.22 KG/M2 | WEIGHT: 223 LBS | SYSTOLIC BLOOD PRESSURE: 159 MMHG | HEART RATE: 98 BPM | DIASTOLIC BLOOD PRESSURE: 92 MMHG | OXYGEN SATURATION: 96 %

## 2023-06-26 DIAGNOSIS — J30.89 ALLERGIC RHINITIS DUE TO DUST MITE: ICD-10-CM

## 2023-06-26 DIAGNOSIS — L30.9 DERMATITIS: ICD-10-CM

## 2023-06-26 DIAGNOSIS — J45.30 MILD PERSISTENT ASTHMA WITHOUT COMPLICATION: ICD-10-CM

## 2023-06-26 DIAGNOSIS — J30.81 ALLERGIC RHINITIS DUE TO ANIMALS: ICD-10-CM

## 2023-06-26 DIAGNOSIS — J30.89 ALLERGIC RHINITIS CAUSED BY MOLD: ICD-10-CM

## 2023-06-26 DIAGNOSIS — J30.1 SEASONAL ALLERGIC RHINITIS DUE TO POLLEN: Primary | ICD-10-CM

## 2023-06-26 DIAGNOSIS — H10.13 ALLERGIC CONJUNCTIVITIS, BILATERAL: ICD-10-CM

## 2023-06-26 PROCEDURE — 99214 OFFICE O/P EST MOD 30 MIN: CPT | Performed by: ALLERGY & IMMUNOLOGY

## 2023-06-26 RX ORDER — CLOBETASOL PROPIONATE 0.5 MG/G
OINTMENT TOPICAL
Qty: 60 G | Refills: 3 | Status: SHIPPED | OUTPATIENT
Start: 2023-06-26 | End: 2024-02-13

## 2023-06-26 RX ORDER — FLUTICASONE PROPIONATE AND SALMETEROL 100; 50 UG/1; UG/1
1 POWDER RESPIRATORY (INHALATION) EVERY 12 HOURS
Qty: 60 EACH | Refills: 5 | Status: SHIPPED | OUTPATIENT
Start: 2023-06-26 | End: 2023-11-06

## 2023-06-26 RX ORDER — AZELASTINE 1 MG/ML
2 SPRAY, METERED NASAL 2 TIMES DAILY
Qty: 30 ML | Refills: 11 | Status: SHIPPED | OUTPATIENT
Start: 2023-06-26 | End: 2023-10-10

## 2023-06-26 RX ORDER — AZELASTINE HYDROCHLORIDE 0.5 MG/ML
1 SOLUTION/ DROPS OPHTHALMIC 2 TIMES DAILY
Qty: 6 ML | Refills: 3 | Status: SHIPPED | OUTPATIENT
Start: 2023-06-26 | End: 2023-10-10

## 2023-06-26 RX ORDER — FLUTICASONE PROPIONATE 50 MCG
2 SPRAY, SUSPENSION (ML) NASAL DAILY
Qty: 16 G | Refills: 11 | Status: SHIPPED | OUTPATIENT
Start: 2023-06-26 | End: 2024-03-06

## 2023-06-26 ASSESSMENT — ENCOUNTER SYMPTOMS
CHEST TIGHTNESS: 0
STRIDOR: 0
COUGH: 0
SHORTNESS OF BREATH: 1
VOMITING: 0
ABDOMINAL PAIN: 0
SINUS PRESSURE: 1
DIARRHEA: 0
FACIAL SWELLING: 0
EYE REDNESS: 0
NAUSEA: 0
EYE DISCHARGE: 1
ACTIVITY CHANGE: 0
WHEEZING: 1
EYE ITCHING: 1
FEVER: 0
HEADACHES: 0
FATIGUE: 0
ADENOPATHY: 0
RHINORRHEA: 1

## 2023-06-26 ASSESSMENT — ASTHMA QUESTIONNAIRES
ACT_TOTALSCORE: 20
QUESTION_2 LAST FOUR WEEKS HOW OFTEN HAVE YOU HAD SHORTNESS OF BREATH: ONCE OR TWICE A WEEK
QUESTION_1 LAST FOUR WEEKS HOW MUCH OF THE TIME DID YOUR ASTHMA KEEP YOU FROM GETTING AS MUCH DONE AT WORK, SCHOOL OR AT HOME: A LITTLE OF THE TIME
QUESTION_3 LAST FOUR WEEKS HOW OFTEN DID YOUR ASTHMA SYMPTOMS (WHEEZING, COUGHING, SHORTNESS OF BREATH, CHEST TIGHTNESS OR PAIN) WAKE YOU UP AT NIGHT OR EARLIER THAN USUAL IN THE MORNING: NOT AT ALL
QUESTION_5 LAST FOUR WEEKS HOW WOULD YOU RATE YOUR ASTHMA CONTROL: SOMEWHAT CONTROLLED
QUESTION_4 LAST FOUR WEEKS HOW OFTEN HAVE YOU USED YOUR RESCUE INHALER OR NEBULIZER MEDICATION (SUCH AS ALBUTEROL): ONCE A WEEK OR LESS
ACT_TOTALSCORE: 20

## 2023-06-26 NOTE — PATIENT INSTRUCTIONS
Call to schedule breathing test    [phone]    Do not use albuterol on the day of the breathing test if possible.     Continue montelukast 10 mg by mouth once daily.   Use Advair 100/50 mcg 1 puff twice daily.   Continue using albuterol inhaler 2 to 4 puffs every 4 hours as needed for persistent cough/chest tightness/wheezing/shortness of breath.   Increase Flonase to 2 sprays in each nostril once daily.  When symptoms are really bad use Flonase 2 sprays in each nostril twice daily.  Once you are better, you can decrease the dose to once daily.   Use azelastine 2 sprays in each nostril twice daily as needed.   Claritin-D once daily only as needed.  Also, when your sinuses flare, you can stop Flonase and use budesonide irrigations since that.       Apply clobetasol on your hands and right foot twice daily sparingly to affected areas only but not more than 14 days in a row.   You may want to show it to dermatology.           Prescription Assistance  If you need assistance with your prescriptions (cost, coverage, etc) please contact: [place] [facility] [phone]        If labs have been ordered/completed, please allow 7-14 business days for final interpretation of results to be sent on My Chart, phone or mail. Some lab results can take up to 28 days for results.       Allergy Staff Appt Hours Shot Hours Locations    Physician     Curt Champagne MD       Support Staff     Eloina Beck CMA    Tuesday:   [place] 7-5     Wednesday:  : :         :  [place] 7-3     [place]        Tuesday: : : :10        Tuesday: :10        Thursday: 7-3:10    [place]       Tues & Wed: :10       Thurs: :10       Fri:            [facility]  [address] [place] [address]  Appt Line: [phone]      [facility]  [address] [place] [address]  Appt Line: [phone]    Pulmonary Function Scheduling:  [place]: [phone]  [place]: [phone]  [phone]

## 2023-06-26 NOTE — PROGRESS NOTES
"SUBJECTIVE:                                                                   Silviano Collins presents today to our Allergy Clinic at [facility] for a follow up visit. He is a 47 year old male with history of asthma and allergic rhinitis.    History of Spring and Fall exacerbated rhinoconjunctivitis symptoms.  History of developing 2-3 sinus infections per year.  In June 2022, elevated total serum IgE, 1811.  Serum IgE for regional aeroallergen panel with sensitivity to cat, dog, dust mites, grass pollen, tree pollen, weed pollen, and molds.  In 2022, nasal endoscopy did not reveal any nasal polyps. In 2021, Dr. Srivastava mentioned possible nasal polyposis on the left side.  Labs without red flags for primary immunodeficiency.     History of asthma since he was a teenager.  Manifested by wheezing and shortness of breath.  Triggered by exposure to animals, pollen, and dust.  Most of the time, symptoms well controlled with Advair 100/50 mcg 1 puff once daily, montelukast 10 mg by mouth once daily, and albuterol as needed. In July 2022, spirometry with Moderately Severe Airflow Obstruction with a significant bronchodilator response.       In June 2020, I recommended a follow-up appointment in 3 months. I have not seen the patient since then.    Silviano reports asthma symptoms are fairly well controlled with Advair 100/50 mcg 1 puff once daily, montelukast 10 mg by mouth once daily, and albuterol as needed. Silviano uses albuterol HFA once- twice weekly for rescue from chest symptoms. He wakes up less than twice per month due to chest symptoms. There has been no use of oral steroids since the last visit. No ED/PCP/urgent care/other specialist visits for asthma flare since the previous visit. The patient denies current chest tightness, cough, wheezing, or shortness of breath, but he gets those symptoms \"every now and then.\"      He uses Flonase 1 spray in each nostril once daily, azelastine 1 " spray in each nostril once daily, and Loratadine D as needed. He doesn't use budesonide all the time. This regimen is being contained, but he still has sinus pressure, nasal congestion, rhinorrhea, and sneezing. He has no time for allergen immunotherapy. Optivar eyedrops are helpful with ocular symptoms.    Patient Active Problem List   Diagnosis     Allergic rhinitis due to pollen     Mild intermittent asthma     Controlled type 2 diabetes mellitus without complication, without long-term current use of insulin (H)     Hyperlipidemia     Obesity     KRISTEL (obstructive sleep apnea)     Asthma     Vitamin D deficiency       Past Medical History:   Diagnosis Date     Allergic rhinitis due to other allergen     seasonal allergies (spring and fall)     Mild intermittent asthma     exercise, seasonal, associated with URIs      Problem (# of Occurrences) Relation (Name,Age of Onset)    Diabetes (1) Mother: D: 40 pneumonia,  Type I DM    Family History Negative (2) Father, Brother        Past Surgical History:   Procedure Laterality Date     HC TOOTH EXTRACTION W/FORCEP  1995    4 wisdom teeth removed withotu complications     Social History     Socioeconomic History     Marital status:      Spouse name: None     Number of children: None     Years of education: None     Highest education level: None   Tobacco Use     Smoking status: Former     Smokeless tobacco: Never   Vaping Use     Vaping Use: Never used   Substance and Sexual Activity     Alcohol use: Yes     Comment: 2x month     Sexual activity: Not Currently   Social History Narrative        6/26/23    ENVIRONMENTAL HISTORY: [place] The home is heated with a forced air. They does have central air conditioning. The patient's bedroom is furnished with feather/wool bedding or pillows and carpet in the bedroom.  Pets inside the house include 2 dog(s). There is no history of cockroach or mice infestation. There is/are 0  smokers in the house.  The house does not have a damp basement.            Review of Systems   Constitutional: Negative for activity change, fatigue and fever.   HENT: Positive for congestion, postnasal drip, rhinorrhea, sinus pressure and sneezing. Negative for ear pain, facial swelling and nosebleeds.    Eyes: Positive for discharge and itching. Negative for redness.   Respiratory: Positive for shortness of breath and wheezing. Negative for cough, chest tightness and stridor.    Gastrointestinal: Negative for abdominal pain, diarrhea, nausea and vomiting.   Skin: Positive for rash.   Allergic/Immunologic: Positive for environmental allergies.   Neurological: Negative for headaches.   Hematological: Negative for adenopathy.   Psychiatric/Behavioral: Negative for self-injury.           Current Outpatient Medications:      albuterol (PROAIR HFA/PROVENTIL HFA/VENTOLIN HFA) 108 (90 Base) MCG/ACT inhaler, Inhale 2 puffs into the lungs every 6 hours, Disp: 36 g, Rfl: 3     alcohol swab prep pads, Use to swab area of injection/dominick as directed., Disp: 100 each, Rfl: 0     azelastine (ASTELIN) 0.1 % nasal spray, Spray 2 sprays into both nostrils 2 times daily, Disp: 30 mL, Rfl: 11     azelastine (OPTIVAR) 0.05 % ophthalmic solution, Place 1 drop into both eyes 2 times daily, Disp: 6 mL, Rfl: 3     blood glucose (NO BRAND SPECIFIED) test strip, Use to test blood sugar once daily or as directed. To accompany: Blood Glucose Monitor Brands: per insurance., Disp: 100 strip, Rfl: 0     blood glucose calibration (NO BRAND SPECIFIED) solution, To accompany: Blood Glucose Monitor Brands: per insurance., Disp: 1 each, Rfl: 0     blood glucose monitoring (NO BRAND SPECIFIED) meter device kit, Use to test blood sugar one time daily or as directed. Preferred blood glucose meter OR supplies to accompany: Blood Glucose Monitor Brands: per insurance., Disp: 1 kit, Rfl: 0     budesonide (PULMICORT) 0.5 MG/2ML neb solution, Add one ampule  into [facility] sinus rinse bottle with saline mixture, and irrigate daily, Disp: 60 mL, Rfl: 11     clobetasol (TEMOVATE) 0.05 % external ointment, Apply sparingly to affected area two times daily as needed but not more than 14 days in a row. Spare face, armpits, neck, and groin., Disp: 60 g, Rfl: 3     Continuous Blood Gluc  (DEXCOM G6 ) EVA, Use to read blood sugars as per 's instructions., Disp: 1 each, Rfl: 0     Continuous Blood Gluc Sensor (DEXCOM G6 SENSOR) MISC, Change every 10 days., Disp: 3 each, Rfl: 5     Continuous Blood Gluc Transmit (DEXCOM G6 TRANSMITTER) MISC, Change every 3 months., Disp: 1 each, Rfl: 1     fluticasone (FLONASE) 50 MCG/ACT nasal spray, Spray 2 sprays into both nostrils daily, Disp: 16 g, Rfl: 11     fluticasone-salmeterol (ADVAIR DISKUS) 100-50 MCG/ACT inhaler, Inhale 1 puff into the lungs every 12 hours, Disp: 60 each, Rfl: 5     loratadine-pseudoePHEDrine (CLARITIN-D 24-HOUR)  MG 24 hr tablet, Take 1 tablet by mouth as needed for allergies or congestion, Disp: 30 tablet, Rfl: 1     metFORMIN (GLUCOPHAGE XR) 500 MG 24 hr tablet, Take 1 tablet (500 mg) by mouth 2 times daily (with meals), Disp: 60 tablet, Rfl: 2     modafinil (PROVIGIL) 200 MG tablet, Take 1 tablet (200 mg) by mouth daily Take 1 tab every morning for sleepiness due to KRISTEL, Disp: 30 tablet, Rfl: 0     montelukast (SINGULAIR) 10 MG tablet, Take 1 tablet (10 mg) by mouth At Bedtime, Disp: 90 tablet, Rfl: 1     MULTI-VITAMIN OR TABS, , Disp: , Rfl:      sertraline (ZOLOFT) 50 MG tablet, Take 1 tablet (50 mg) by mouth daily as needed (sex) Take one tablet 8-12 hours before sex, Disp: 30 tablet, Rfl: 1     sildenafil (REVATIO) 20 MG tablet, Take 1 tablet (20 mg) by mouth 3 times daily, Disp: 30 tablet, Rfl: 4     thin (NO BRAND SPECIFIED) lancets, Use with lanceting device. To accompany: Blood Glucose Monitor Brands: per insurance., Disp: 100 each, Rfl: 0     valACYclovir (VALTREX) 500 MG  "tablet, Take 1 tablet (500 mg) by mouth 2 times daily, Disp: 30 tablet, Rfl: 1  Immunization History   Administered Date(s) Administered     COVID-19 Bivalent 12+ (Pfizer) 12/09/2022     COVID-19 MONOVALENT 12+ (Pfizer) 03/25/2021, 04/15/2021, 11/10/2021     Influenza (IIV3) PF 09/15/2015     Influenza Intranasal Vaccine 10/17/2011     Influenza Vaccine >6 months (Alfuria,Fluzone) 02/18/2020, 12/23/2021     Influenza,INJ,MDCK,PF,Quad >6mo(Flucelvax) 10/08/2020     Pneumococcal 20 valent Conjugate (Prevnar 20) 06/21/2022     Pneumococcal 23 valent 05/04/2021     TD,PF 7+ (Tenivac) 11/01/2004     TDAP Vaccine (Boostrix) 02/27/2015     Allergies   Allergen Reactions     Gramineae Pollens Other (See Comments)     Other reaction(s): Runny Nose     OBJECTIVE:                                                                 BP (!) 159/92   Pulse 98   Temp (!) 95.1  F (35.1  C) (Temporal)   Ht 1.803 m (5' 11\")   Wt 101.2 kg (223 lb)   SpO2 96%   BMI 31.10 kg/m          Physical Exam  Vitals and nursing note reviewed.   Constitutional:       General: He is not in acute distress.     Appearance: He is not diaphoretic.   HENT:      Head: Normocephalic and atraumatic.      Right Ear: Tympanic membrane, ear canal and external ear normal.      Left Ear: Tympanic membrane, ear canal and external ear normal.      Nose: Mucosal edema (Mild) present. No rhinorrhea.      Right Turbinates: Enlarged (Mildly).      Left Turbinates: Enlarged (Mildly).      Mouth/Throat:      Mouth: Mucous membranes are moist.      Pharynx: Oropharynx is clear.   Eyes:      General:         Right eye: No discharge.         Left eye: No discharge.      Conjunctiva/sclera: Conjunctivae normal.   Cardiovascular:      Rate and Rhythm: Normal rate and regular rhythm.      Heart sounds: Normal heart sounds. No murmur heard.  Pulmonary:      Effort: Pulmonary effort is normal. No respiratory distress.      Breath sounds: Normal breath sounds. No wheezing or " rales.   Musculoskeletal:      Cervical back: Normal range of motion.   Skin:     General: Skin is warm.      Comments: On the dorsal aspect of the right foot, there is a circular, erythematous skin lesion with fine scaling.  Thickened skin of both palms noted.  See pictures.   Neurological:      Mental Status: He is alert and oriented to person, place, and time.           WORKUP: ACT 20    ASSESSMENT/PLAN:    Seasonal allergic rhinitis due to pollen  Allergic rhinitis due to animals  Allergic rhinitis caused by mold  Allergic rhinitis due to dust mite  Allergic conjunctivitis, bilateral    Symptoms are frequently suboptimally controlled.  He states that he does not have time for allergen immunotherapy.  - Increase intranasal fluticasone to 2 sprays in each nostril once daily.  For short time, he can increase the dose to 2 sprays in each nostril twice daily.  - Increase azelastine to 2 sprays in each nostril twice daily as needed.  - Take Claritin-D once daily as needed.  - He can substitute intranasal fluticasone with budesonide irrigations prescribed by ENT in the past.  - Continue Optivar 1 drop in each eye twice daily as needed.    - azelastine (OPTIVAR) 0.05 % ophthalmic solution  Dispense: 6 mL; Refill: 3  - loratadine-pseudoePHEDrine (CLARITIN-D 24-HOUR)  MG 24 hr tablet  Dispense: 30 tablet; Refill: 1  - fluticasone (FLONASE) 50 MCG/ACT nasal spray  Dispense: 16 g; Refill: 11  - azelastine (ASTELIN) 0.1 % nasal spray  Dispense: 30 mL; Refill: 11    Mild persistent asthma without complication    He reports the symptoms are fairly well controlled with Advair 100/50 mcg 1 puff once-twice daily, montelukast 10 mg by mouth once daily, and albuterol as needed.  On the other hand, spirometry in 2022 oh suggested moderate obstruction with postbronchodilator response.  - Continue Advair 100/50 mcg 1 puff twice daily and montelukast 10 mg by mouth once daily at bedtime.  - Continue albuterol as needed.  -  Ordered interval spirometry.  If still moderately obstructed, may consider increasing the dose of ICS.    - fluticasone-salmeterol (ADVAIR DISKUS) 100-50 MCG/ACT inhaler  Dispense: 60 each; Refill: 5  - General PFT Lab (Please always keep checked)  - Pulmonary Function Test    Dermatitis    Psoriasis versus eczema versus fungal.  - Trial of clobetasol.  If not helpful, recommend to see Dermatology.    - clobetasol (TEMOVATE) 0.05 % external ointment  Dispense: 60 g; Refill: 3           Return in about 3 months (around 9/26/2023), or if symptoms worsen or fail to improve.    Thank you for allowing us to participate in the care of this patient. Please feel free to contact us if there are any questions or concerns about the patient.    Disclaimer: This note consists of symbols derived from keyboarding, dictation and/or voice recognition software. As a result, there may be errors in the script that have gone undetected. Please consider this when interpreting information found in this chart.    Curt Champagne MD, FAAAAI, FACAAI  Allergy, Asthma and Immunology     [facility]

## 2023-07-29 DIAGNOSIS — J30.89 ALLERGIC RHINITIS CAUSED BY MOLD: ICD-10-CM

## 2023-07-29 DIAGNOSIS — J30.81 ALLERGIC RHINITIS DUE TO ANIMALS: ICD-10-CM

## 2023-07-29 DIAGNOSIS — J30.89 ALLERGIC RHINITIS DUE TO DUST MITE: ICD-10-CM

## 2023-07-29 DIAGNOSIS — J30.1 SEASONAL ALLERGIC RHINITIS DUE TO POLLEN: ICD-10-CM

## 2023-07-29 RX ORDER — LORATADINE AND PSEUDOEPHEDRINE SULFATE 10; 240 MG/1; MG/1
TABLET, FILM COATED, EXTENDED RELEASE ORAL
Qty: 30 TABLET | Refills: 1 | Status: SHIPPED | OUTPATIENT
Start: 2023-07-29 | End: 2024-03-20

## 2023-08-04 DIAGNOSIS — G47.33 OSA (OBSTRUCTIVE SLEEP APNEA): ICD-10-CM

## 2023-08-04 RX ORDER — MODAFINIL 200 MG/1
200 TABLET ORAL DAILY
Qty: 30 TABLET | Refills: 0 | Status: SHIPPED | OUTPATIENT
Start: 2023-08-04 | End: 2023-08-15

## 2023-08-04 NOTE — TELEPHONE ENCOUNTER
Pending Prescriptions:                       Disp   Refills    modafinil (PROVIGIL) 200 MG tablet        30 tab*0            Sig: Take 1 tablet (200 mg) by mouth daily Take 1 tab           every morning for sleepiness due to KRISTEL    Pt is out of medication

## 2023-08-10 DIAGNOSIS — E11.65 UNCONTROLLED TYPE 2 DIABETES MELLITUS WITH HYPERGLYCEMIA (H): Primary | ICD-10-CM

## 2023-08-15 ENCOUNTER — MYC REFILL (OUTPATIENT)
Dept: FAMILY MEDICINE | Facility: CLINIC | Age: 48
End: 2023-08-15
Payer: COMMERCIAL

## 2023-08-15 DIAGNOSIS — G47.33 OSA (OBSTRUCTIVE SLEEP APNEA): ICD-10-CM

## 2023-08-15 RX ORDER — MODAFINIL 200 MG/1
200 TABLET ORAL DAILY
Qty: 30 TABLET | Refills: 0 | Status: SHIPPED | OUTPATIENT
Start: 2023-08-15 | End: 2024-03-31

## 2023-08-16 ENCOUNTER — LAB (OUTPATIENT)
Dept: LAB | Facility: CLINIC | Age: 48
End: 2023-08-16
Payer: COMMERCIAL

## 2023-08-16 ENCOUNTER — MYC MEDICAL ADVICE (OUTPATIENT)
Dept: FAMILY MEDICINE | Facility: CLINIC | Age: 48
End: 2023-08-16

## 2023-08-16 DIAGNOSIS — E11.65 UNCONTROLLED TYPE 2 DIABETES MELLITUS WITH HYPERGLYCEMIA (H): ICD-10-CM

## 2023-08-16 LAB
ANION GAP SERPL CALCULATED.3IONS-SCNC: 13 MMOL/L (ref 7–15)
BUN SERPL-MCNC: 9.7 MG/DL (ref 6–20)
CALCIUM SERPL-MCNC: 9 MG/DL (ref 8.6–10)
CHLORIDE SERPL-SCNC: 101 MMOL/L (ref 98–107)
CREAT SERPL-MCNC: 0.81 MG/DL (ref 0.67–1.17)
DEPRECATED HCO3 PLAS-SCNC: 22 MMOL/L (ref 22–29)
GFR SERPL CREATININE-BSD FRML MDRD: >90 ML/MIN/1.73M2
GLUCOSE SERPL-MCNC: 287 MG/DL (ref 70–99)
HBA1C MFR BLD: 8.2 % (ref 0–5.6)
POTASSIUM SERPL-SCNC: 4.1 MMOL/L (ref 3.4–5.3)
SODIUM SERPL-SCNC: 136 MMOL/L (ref 136–145)

## 2023-08-16 PROCEDURE — 36415 COLL VENOUS BLD VENIPUNCTURE: CPT

## 2023-08-16 PROCEDURE — 83036 HEMOGLOBIN GLYCOSYLATED A1C: CPT

## 2023-08-16 PROCEDURE — 80048 BASIC METABOLIC PNL TOTAL CA: CPT

## 2023-09-16 ENCOUNTER — HEALTH MAINTENANCE LETTER (OUTPATIENT)
Age: 48
End: 2023-09-16

## 2023-09-19 ENCOUNTER — VIRTUAL VISIT (OUTPATIENT)
Dept: PSYCHOLOGY | Facility: CLINIC | Age: 48
End: 2023-09-19
Payer: COMMERCIAL

## 2023-09-19 DIAGNOSIS — F43.23 ADJUSTMENT DISORDER WITH MIXED ANXIETY AND DEPRESSED MOOD: Primary | ICD-10-CM

## 2023-09-19 PROCEDURE — 90791 PSYCH DIAGNOSTIC EVALUATION: CPT | Mod: 95 | Performed by: MARRIAGE & FAMILY THERAPIST

## 2023-09-19 ASSESSMENT — COLUMBIA-SUICIDE SEVERITY RATING SCALE - C-SSRS
2. HAVE YOU ACTUALLY HAD ANY THOUGHTS OF KILLING YOURSELF?: NO
1. HAVE YOU WISHED YOU WERE DEAD OR WISHED YOU COULD GO TO SLEEP AND NOT WAKE UP?: NO
TOTAL  NUMBER OF ABORTED OR SELF INTERRUPTED ATTEMPTS LIFETIME: NO
ATTEMPT LIFETIME: NO
TOTAL  NUMBER OF INTERRUPTED ATTEMPTS LIFETIME: NO
6. HAVE YOU EVER DONE ANYTHING, STARTED TO DO ANYTHING, OR PREPARED TO DO ANYTHING TO END YOUR LIFE?: NO

## 2023-09-19 ASSESSMENT — PATIENT HEALTH QUESTIONNAIRE - PHQ9
10. IF YOU CHECKED OFF ANY PROBLEMS, HOW DIFFICULT HAVE THESE PROBLEMS MADE IT FOR YOU TO DO YOUR WORK, TAKE CARE OF THINGS AT HOME, OR GET ALONG WITH OTHER PEOPLE: NOT DIFFICULT AT ALL
SUM OF ALL RESPONSES TO PHQ QUESTIONS 1-9: 2
SUM OF ALL RESPONSES TO PHQ QUESTIONS 1-9: 2

## 2023-09-19 NOTE — Clinical Note
Shilpa, Dr. Schmitz.  I met with Juanjose and his spouse for Diagnostic Assessment/family therapy intake.  Plan for therapy will be to focus in improving interpersonal functioning/relationship.  Looking forward to working further with Juanjose and his spouse!
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
## 2023-09-25 ENCOUNTER — VIRTUAL VISIT (OUTPATIENT)
Dept: PSYCHOLOGY | Facility: CLINIC | Age: 48
End: 2023-09-25
Payer: COMMERCIAL

## 2023-09-25 DIAGNOSIS — F43.23 ADJUSTMENT DISORDER WITH MIXED ANXIETY AND DEPRESSED MOOD: Primary | ICD-10-CM

## 2023-09-25 PROCEDURE — 90847 FAMILY PSYTX W/PT 50 MIN: CPT | Mod: VID | Performed by: MARRIAGE & FAMILY THERAPIST

## 2023-09-25 ASSESSMENT — PATIENT HEALTH QUESTIONNAIRE - PHQ9
SUM OF ALL RESPONSES TO PHQ QUESTIONS 1-9: 0
10. IF YOU CHECKED OFF ANY PROBLEMS, HOW DIFFICULT HAVE THESE PROBLEMS MADE IT FOR YOU TO DO YOUR WORK, TAKE CARE OF THINGS AT HOME, OR GET ALONG WITH OTHER PEOPLE: NOT DIFFICULT AT ALL
SUM OF ALL RESPONSES TO PHQ QUESTIONS 1-9: 0

## 2023-09-25 ASSESSMENT — ANXIETY QUESTIONNAIRES
5. BEING SO RESTLESS THAT IT IS HARD TO SIT STILL: NOT AT ALL
6. BECOMING EASILY ANNOYED OR IRRITABLE: NOT AT ALL
4. TROUBLE RELAXING: NOT AT ALL
IF YOU CHECKED OFF ANY PROBLEMS ON THIS QUESTIONNAIRE, HOW DIFFICULT HAVE THESE PROBLEMS MADE IT FOR YOU TO DO YOUR WORK, TAKE CARE OF THINGS AT HOME, OR GET ALONG WITH OTHER PEOPLE: NOT DIFFICULT AT ALL
3. WORRYING TOO MUCH ABOUT DIFFERENT THINGS: NOT AT ALL
GAD7 TOTAL SCORE: 0
GAD7 TOTAL SCORE: 0
7. FEELING AFRAID AS IF SOMETHING AWFUL MIGHT HAPPEN: NOT AT ALL
2. NOT BEING ABLE TO STOP OR CONTROL WORRYING: NOT AT ALL
1. FEELING NERVOUS, ANXIOUS, OR ON EDGE: NOT AT ALL

## 2023-09-25 NOTE — PROGRESS NOTES
[facility]                                     Progress Note    Patient Name: Silviano Collins  Date: 9/25/23         Service Type: Family with client present      Session Start Time: 12:00  Session End Time: 12:54     Session Length: 54    Session #: 1    Attendees: Client and Spouse / Significant Other    Service Modality:  Video Visit:      Provider verified identity through the following two step process.  Patient provided:  Patient is known previously to provider and Patient was verified at admission/transfer    Telemedicine Visit: The patient's condition can be safely assessed and treated via synchronous audio and visual telemedicine encounter.      Reason for Telemedicine Visit: Services only offered telehealth    Originating Site (Patient Location): Patient's other home and vehice    Distant Site (Provider Location): Provider Remote Setting- Home Office    Consent:  The patient/guardian has verbally consented to: the potential risks and benefits of telemedicine (video visit) versus in person care; bill my insurance or make self-payment for services provided; and responsibility for payment of non-covered services.     Patient would like the video invitation sent by:  My Chart    Mode of Communication:  Video Conference via [facility]    Distant Location (Provider):  Off-site    As the provider I attest to compliance with applicable laws and regulations related to telemedicine.    DATA  Interactive Complexity: No  Crisis: No        Progress Since Last Session (Related to Symptoms / Goals / Homework):   Symptoms: Improving decreased PHQ-9 score.    Homework: Achieved / completed to satisfaction  Completed in session      Episode of Care Goals: Minimal progress - PREPARATION (Decided to change - considering how); Intervened by negotiating a change plan and determining options / strategies for behavior change, identifying triggers, exploring social supports, and working towards setting a date  "to begin behavior change     Current / Ongoing Stressors and Concerns:   Patient and spouse reported regarding ongoing relationship issues     Treatment Objective(s) Addressed in This Session:   learn & utilize at least some assertive communication skills weekly  Patient and spouse identified their desired family therapy goals.     Intervention:   CBT: reviewed with patient/spouse journaling to increase awareness of necessities for trust-building and values.  Interpersonal Therapy: taught/reviewed with patient/spouse using double \"I\" statements and active/reflective listening skills/strategies to improve mutual understanding, and scheduling weekly check-in times.  Motivational Interviewing: reviewed with patient/spouse their desired family therapy goals.    Assessments completed prior to visit:  The following assessments were completed by patient for this visit:  PHQ9:       8/6/2023     8:49 PM 9/19/2023     1:13 PM 9/25/2023    11:57 AM   PHQ-9 SCORE   PHQ-9 Total Score MyChart 1 (Minimal depression) 2 (Minimal depression) 0   PHQ-9 Total Score 1 2 0     GAD7:       9/25/2023    11:58 AM   SOLIS-7 SCORE   Total Score 0 (minimal anxiety)   Total Score 0         ASSESSMENT: Current Emotional / Mental Status (status of significant symptoms):   Risk status (Self / Other harm or suicidal ideation)   Patient denies current fears or concerns for personal safety.   Patient denies current or recent suicidal ideation or behaviors.   Patient denies current or recent homicidal ideation or behaviors.   Patient denies current or recent self injurious behavior or ideation.   Patient denies other safety concerns.   Patient reports there has been no change in risk factors since their last session.     Patient reports there has been no change in protective factors since their last session.     Recommended that patient call 911 or go to the local ED should there be a change in any of these risk factors.     Appearance:   Appropriate " "   Eye Contact:   Good    Psychomotor Behavior: Normal    Attitude:   Cooperative  Interested Friendly Pleasant Attentive   Orientation:   All   Speech    Rate / Production: Normal/ Responsive Talkative    Volume:  Normal    Mood:    Anxious  Depressed  Normal   Affect:    Appropriate  Subdued  Worrisome    Thought Content:  Clear  Rumination    Thought Form:  Coherent  Goal Directed  Logical    Insight:    Good  and Intellectual Insight     Medication Review:   No changes to current psychiatric medication(s)     Medication Compliance:   Yes     Changes in Health Issues:   None reported     Chemical Use Review:   Substance Use: Chemical use reviewed, no active concerns identified      Tobacco Use: No current tobacco use.      Diagnosis:  1. Adjustment disorder with mixed anxiety and depressed mood        Collateral Reports Completed:   Not Applicable    PLAN: (Patient Tasks / Therapist Tasks / Other)  Patient/spouse agreed to work on journaling to increase awareness of necessities for trust-building and values, using double \"I\" statements and active/reflective listening skills/strategies to improve mutual understanding, and scheduling weekly check-in times.        Nagi Euceda, [facility] 9/25/23                                                         ______________________________________________________________________    Family with client present Treatment Plan    Patient's Name: Silviano Collins  YOB: 1975    Date of Creation: 9/25/23  Date Treatment Plan Last Reviewed/Revised: 9/25/23, next due 12/25/23.    DSM5 Diagnoses: Adjustment Disorders  309.28 (F43.23) With mixed anxiety and depressed mood  Psychosocial / Contextual Factors: relationship conflict related to infidelity  PROMIS (reviewed every 90 days): 27 (9/17/23)    Referral / Collaboration:  Referral to another professional/service is not indicated at this time..    Anticipated number of session for this episode of care:  " 11-20  Anticipation frequency of session: Every other week  Anticipated Duration of each session:  38-53+  Treatment plan will be reviewed in 90 days or when goals have been changed.       MeasurableTreatment Goal(s) related to diagnosis / functional impairment(s)  Goal 1: Patient and spouse will improve interpersonal functioning/relationship by 2 points on a 1-10 Likert scale per self-report (10 = optimal interpersonal functioning).    I will know I've met my goal when my relationship have improved and stabilized, marriage intact.      Objective #A (Client Action)    Status: New - Date: 9/25/23     Patient will learn & utilize at least some assertive communication skills weekly.    Intervention(s)  Therapist will teach assertiveness skills. Nonviolent Communication and DBT Interpersonal Effectiveness skills..    Objective #B  Patient will practice setting boundaries some times in the next 12 weeks.    Status: New - Date: 9/25/23     Intervention(s)  Therapist will teach assertiveness skills. [place]-setting.    Objective #C  Patient and will spouse will engage in positive experiences with each other.  Status:  New - Date: 9/25/23    Intervention(s)  Therapist will teach strategies for connection/increasing intimacy.    Patient and significant other/spouse have reviewed and agreed to the above plan.      Nagi Euceda, LMFT  September 25, 2023

## 2023-10-10 ENCOUNTER — OFFICE VISIT (OUTPATIENT)
Dept: ALLERGY | Facility: CLINIC | Age: 48
End: 2023-10-10
Payer: COMMERCIAL

## 2023-10-10 DIAGNOSIS — J45.20 MILD INTERMITTENT ASTHMA, UNSPECIFIED WHETHER COMPLICATED: ICD-10-CM

## 2023-10-10 DIAGNOSIS — H10.13 ALLERGIC CONJUNCTIVITIS, BILATERAL: ICD-10-CM

## 2023-10-10 DIAGNOSIS — J30.89 ALLERGIC RHINITIS DUE TO DUST MITE: ICD-10-CM

## 2023-10-10 PROCEDURE — 99214 OFFICE O/P EST MOD 30 MIN: CPT | Performed by: DERMATOLOGY

## 2023-10-10 RX ORDER — PREDNISONE 10 MG/1
TABLET ORAL
Qty: 28 TABLET | Refills: 0 | Status: SHIPPED | OUTPATIENT
Start: 2023-10-10 | End: 2023-10-20

## 2023-10-10 RX ORDER — AZELASTINE HYDROCHLORIDE 0.5 MG/ML
1 SOLUTION/ DROPS OPHTHALMIC 2 TIMES DAILY
Qty: 6 ML | Refills: 3 | Status: SHIPPED | OUTPATIENT
Start: 2023-10-10 | End: 2024-03-20

## 2023-10-10 RX ORDER — AZELASTINE 1 MG/ML
2 SPRAY, METERED NASAL 2 TIMES DAILY
Qty: 30 ML | Refills: 11 | Status: SHIPPED | OUTPATIENT
Start: 2023-10-10 | End: 2024-03-06

## 2023-10-10 RX ORDER — MONTELUKAST SODIUM 10 MG/1
10 TABLET ORAL AT BEDTIME
Qty: 90 TABLET | Refills: 1 | Status: SHIPPED | OUTPATIENT
Start: 2023-10-10 | End: 2024-04-24

## 2023-10-10 RX ORDER — LEVOCETIRIZINE DIHYDROCHLORIDE 5 MG/1
5 TABLET, FILM COATED ORAL 2 TIMES DAILY
Qty: 60 TABLET | Refills: 3 | Status: SHIPPED | OUTPATIENT
Start: 2023-10-10 | End: 2023-11-28

## 2023-10-10 RX ORDER — BUDESONIDE AND FORMOTEROL FUMARATE DIHYDRATE 160; 4.5 UG/1; UG/1
2 AEROSOL RESPIRATORY (INHALATION)
Qty: 10.2 G | Refills: 4 | Status: SHIPPED | OUTPATIENT
Start: 2023-10-10 | End: 2024-03-06 | Stop reason: ALTCHOICE

## 2023-10-10 NOTE — NURSING NOTE
Chief Complaint   Patient presents with    Hives     Here for seasonal allergies, itchy eyes, running nose, cough     Carlota URIBE RN-BSN  Dermatology Surgery  [phone]

## 2023-10-10 NOTE — LETTER
10/10/2023         RE: Silviano Collins  [address] [address]        Dear Colleague,    Thank you for referring your patient, Silviano Collins, to the [facility] [place]. Please see a copy of my visit note below.    [facility] Dermato-[facility]ergology Note  {jgvisit:[id number]}  Encounter Date: Oct 10, 2023  ____________________________________________    CC: Hives (Here for seasonal allergies, itchy eyes, running nose, cough)      HPI:  (Oct 10, 2023)  Mr. Silviano Collins is a(n) 47 year old male who presents today as new patient for allergy consultation  - patient is an allergic patient and well known for all of his allergies. Is taking daily Loratadine D in the morning.  - had last skin prick tests about 10 years ago  - otherwise feeling well in usual state of health    Physical exam:  General: In no acute distress, well-developed, well-nourished  Eyes: conjunctivitis  ENT: acute rhinitis   Pulmonary: no wheezing or coughing  Skin: in the moment no active lesions    Past Medical History:   Patient Active Problem List   Diagnosis     Allergic rhinitis due to pollen     Mild intermittent asthma     Controlled type 2 diabetes mellitus without complication, without long-term current use of insulin (H)     Hyperlipidemia     Obesity     KRISTEL (obstructive sleep apnea)     Asthma     Vitamin D deficiency     Past Medical History:   Diagnosis Date     Allergic rhinitis due to other allergen     seasonal allergies (spring and fall)     Mild intermittent asthma     exercise, seasonal, associated with URIs       Allergies:  Allergies   Allergen Reactions     Gramineae Pollens Other (See Comments)     Other reaction(s): Runny Nose       Medications:  Current Outpatient Medications   Medication     albuterol (PROAIR HFA/PROVENTIL HFA/VENTOLIN HFA) 108 (90 Base) MCG/ACT inhaler     alcohol swab prep pads     azelastine (ASTELIN) 0.1 % nasal spray      azelastine (OPTIVAR) 0.05 % ophthalmic solution     blood glucose (NO BRAND SPECIFIED) test strip     blood glucose calibration (NO BRAND SPECIFIED) solution     blood glucose monitoring (NO BRAND SPECIFIED) meter device kit     budesonide (PULMICORT) 0.5 MG/2ML neb solution     clobetasol (TEMOVATE) 0.05 % external ointment     Continuous Blood Gluc  (DEXCOM G6 ) EVA     Continuous Blood Gluc Sensor (DEXCOM G6 SENSOR) MISC     Continuous Blood Gluc Transmit (DEXCOM G6 TRANSMITTER) MISC     fluticasone (FLONASE) 50 MCG/ACT nasal spray     fluticasone-salmeterol (ADVAIR DISKUS) 100-50 MCG/ACT inhaler     metFORMIN (GLUCOPHAGE XR) 500 MG 24 hr tablet     modafinil (PROVIGIL) 200 MG tablet     montelukast (SINGULAIR) 10 MG tablet     MULTI-VITAMIN OR TABS     sertraline (ZOLOFT) 50 MG tablet     sildenafil (REVATIO) 20 MG tablet     SM ALLERGY RELIEF D  MG 24 hr tablet     thin (NO BRAND SPECIFIED) lancets     triamcinolone (KENALOG) 0.1 % external ointment     valACYclovir (VALTREX) 500 MG tablet     No current facility-administered medications for this visit.       Social History:  The patient works as a [employer] business. Patient has the following hobbies or non-occupational exposure: outdoor    Family History:  Family History   Problem Relation Age of Onset     Diabetes Mother         D: 40 pneumonia,  Type I DM     Family History Negative Father      Family History Negative Brother        Previous Labs, Allergy Tests, Dermatopathology, Imaging:  Seen by Dr. Champagne     Seasonal allergic rhinitis due to pollen  Allergic rhinitis due to animals  Allergic rhinitis caused by mold  Allergic rhinitis due to dust mite  Allergic conjunctivitis, bilateral     Symptoms are frequently suboptimally controlled.  He states that he does not have time for allergen immunotherapy.  - Increase intranasal fluticasone to 2 sprays in each nostril once daily.  For short time, he can increase the dose to 2 sprays  in each nostril twice daily.  - Increase azelastine to 2 sprays in each nostril twice daily as needed.  - Take Claritin-D once daily as needed.  - He can substitute intranasal fluticasone with budesonide irrigations prescribed by ENT in the past.  - Continue Optivar 1 drop in each eye twice daily as needed.     - azelastine (OPTIVAR) 0.05 % ophthalmic solution  Dispense: 6 mL; Refill: 3  - loratadine-pseudoePHEDrine (CLARITIN-D 24-HOUR)  MG 24 hr tablet  Dispense: 30 tablet; Refill: 1  - fluticasone (FLONASE) 50 MCG/ACT nasal spray  Dispense: 16 g; Refill: 11  - azelastine (ASTELIN) 0.1 % nasal spray  Dispense: 30 mL; Refill: 11     Mild persistent asthma without complication  He reports the symptoms are fairly well controlled with Advair 100/50 mcg 1 puff once-twice daily, montelukast 10 mg by mouth once daily, and albuterol as needed.  On the other hand, spirometry in 2022 oh suggested moderate obstruction with postbronchodilator response.  - Continue Advair 100/50 mcg 1 puff twice daily and montelukast 10 mg by mouth once daily at bedtime.  - Continue albuterol as needed.  - Ordered interval spirometry.  If still moderately obstructed, may consider increasing the dose of ICS.     - fluticasone-salmeterol (ADVAIR DISKUS) 100-50 MCG/ACT inhaler  Dispense: 60 each; Refill: 5  - General PFT Lab (Please always keep checked)  - Pulmonary Function Test     Dermatitis  Psoriasis versus eczema versus fungal.  - Trial of clobetasol.  If not helpful, recommend to see Dermatology.     - clobetasol (TEMOVATE) 0.05 % external ointment  Dispense: 60 g; Refill: 3    In lab tests total igE 1,811 and very high to various pollens (birch more than 100 and ragweed 20 to 50 and and grass pollens around 20). However, dust mites, dogs, cats and molds not very high    Referred By: Referred Self, MD  No address on file     Allergy Tests:    Past Allergy Test    Order for Future Allergy Testing:    [] Outpatient  [] Inpatient:  [facility]..../ Bed ....       Skin Atopy (atopic dermatitis) [] Yes   [] No .........  Contact allergies:   [] Yes   [] No ..........  Hand eczema:   [] Yes   [] No           Leading hand:   [] R   [] L       [] Ambidextrous         Drug allergies:        [] Yes   [] No  which?......    Urticaria/Angioedema  [] Yes   [] No .........  Food Allergy:  [] Yes   [] No  which?......  Pets :  [] Yes   [] No  which?......         []  Rhinitis   [] Conjunctivitis   [] Sinusitis   [] Polyposis   [] Otitis   [] Pharyngitis         []  Postnasal drip    []  none  Operations:   [] Tonsils   [] Septum   [] Sinus   [] Polyposis        [] Asthma bronchiale   [] Coughing      []  none  Symptoms (mostly Rhinoconjunctivitis and Asthma) aggravated by:  Season   [] I   [] II   [] III   [] IV   []V   []VI   []VII   []VIII   []IX   []X   []XI   []XII     [] perennial   Day time      [] morning   [] noon      [] evening        [] night    [] whole day........  []  none  Location/changes    [] inside        [] outside   [] mountains    [] sea     [] others.............   []  none  Triggers, specific     [] animals     [] plants     [] dust              [] others ...........................    []  none  Triggers, others       [] work          [] psyche    [] sport            [] others .............................  []  none  Irritant                [] phys efforts [] smoke    [] heat/cold     [] odors  []others............... []  none    Order for PATCH TESTS  Reason for tests (suspected allergy): not necessary  Known previous allergies: n/a  Standardized panels  [] Standard panel (40 tests)  [] Preservatives & Antimicrobials (31 tests)  [] Emulsifiers & Additives (25 tests)   [] Perfumes/Flavours & Plants (25 tests)  [] Hairdresser panel (12 tests)  [] Rubber Chemicals (22 tests)  [] Plastics (26 tests)  [] Colorants/Dyes/Food additives (20 tests)  [] Metals (implants/dental) (24 tests)  [] Local anaesthetics/NSAIDs (13 tests)  [] Antibiotics &  Antimycotics (14 tests)   [] Corticosteroids (15 tests)   [] Photopatch test (62 tests)   [] others: ...      [] Patient's own products: ...  DO NOT test if chemical or biological identity is unknown!   always ask from patient the product information and safety sheets (MSDS)       Order for PRICK TESTS    Reason for tests (suspected allergy): severe allergies  Known previous allergies: ***    Standardized prick panels  [] Atopic panel (20 tests)  [] Pediatric Panel (12 tests)  [] Milk, Meat, Eggs, Grains (20 tests)   [] Dust, Epithelia, Feathers (10 tests)  [] Fish, Seafood, Shellfish (17 tests)  [] Nuts, Beans (14 tests)  [] Spice, Vegetable, Fruit (17 tests)  [] Pollen Panel = Tree, Grass, Weed (24 tests)  [] Others: ...      [] Patient's own products: ...  DO NOT test if chemical or biological identity is unknown!   always ask from patient the product information and safety sheets (MSDS)     Standardized intradermal tests  [] Penicillium notatum [] Aspergillus fumigatus [] House dust mites D.far & D. pteron  [] Cat    [] dog  [] Others: ...  [] Bee venom   [] Wasp venom  !!Specific protocol with dilutions!!       Order for Drug allergy tests (prick & Intradermal & *** patch tests)    [] Penicillin G  [] Ampicillin [] Cefazolin   [] Ceftriaxone   [] Ceftazidime  [] Bactrim    [] Others: ...  Order for ... as test date    [] Patient needs consultation with Allergy team (changes of tests may apply)  [] Tests discussed with Allergy team (can have direct appointment for allergy tests)     ________________________________    Assessment & Plan:    ==> Final Diagnosis:     # atopy with   Severe seasonal RC and Asthma from spring to summer and sensitization to various pollens  * chronic illness with exacerbation, progression, side effects from treatment      These conclusions are made at the best of one's knowledge and belief based on the provided evidence such as patient's history and allergy test results and they can  change over time or can be incomplete because of missing information's.    ==> Treatment Plan:  >> increase antihistamines to 2xdaily (maybe Allegra 180mg or Xyzal 5mg), continue with Symbicort 160 (instead of Advair), Azelastin nasal spray and eye drops  >> patient wanted kenalog shot (would prefer for 2 weeks oral Prednisone starting at 50mg and reducing 5mg daily      Staff:  Provider    Follow-up in Derm-Allergy clinic in winter to perform skin tests and evaluate the situation (maybe rediscuss IT)    I spent a total of 34 minutes with Silviano Collins. This time was spent counseling the patient and/or coordinating care, explaining the allergy tests      Again, thank you for allowing me to participate in the care of your patient.        Sincerely,        Tank Joseph MD

## 2023-10-10 NOTE — PROGRESS NOTES
[facility] Dermato-allergology Note  Office visit  Encounter Date: Oct 10, 2023  ____________________________________________    CC: Hives (Here for seasonal allergies, itchy eyes, running nose, cough)      HPI:  (Oct 10, 2023)  Mr. Silviano Collins is a(n) 47 year old male who presents today as new patient for allergy consultation  - patient is an allergic patient and well known for all of his allergies. Is taking daily Loratadine D in the morning.  - had last skin prick tests about 10 years ago  - otherwise feeling well in usual state of health    Physical exam:  General: In no acute distress, well-developed, well-nourished  Eyes: conjunctivitis  ENT: acute rhinitis   Pulmonary: no wheezing or coughing  Skin: in the moment no active lesions    Past Medical History:   Patient Active Problem List   Diagnosis    Allergic rhinitis due to pollen    Mild intermittent asthma    Controlled type 2 diabetes mellitus without complication, without long-term current use of insulin (H)    Hyperlipidemia    Obesity    KRISTEL (obstructive sleep apnea)    Asthma    Vitamin D deficiency     Past Medical History:   Diagnosis Date    Allergic rhinitis due to other allergen     seasonal allergies (spring and fall)    Mild intermittent asthma     exercise, seasonal, associated with URIs       Allergies:  Allergies   Allergen Reactions    Gramineae Pollens Other (See Comments)     Other reaction(s): Runny Nose       Medications:  Current Outpatient Medications   Medication    albuterol (PROAIR HFA/PROVENTIL HFA/VENTOLIN HFA) 108 (90 Base) MCG/ACT inhaler    alcohol swab prep pads    azelastine (ASTELIN) 0.1 % nasal spray    azelastine (OPTIVAR) 0.05 % ophthalmic solution    blood glucose (NO BRAND SPECIFIED) test strip    blood glucose calibration (NO BRAND SPECIFIED) solution    blood glucose monitoring (NO BRAND SPECIFIED) meter device kit    budesonide (PULMICORT) 0.5 MG/2ML neb solution    clobetasol (TEMOVATE)  0.05 % external ointment    Continuous Blood Gluc  (DEXCOM G6 ) EVA    Continuous Blood Gluc Sensor (DEXCOM G6 SENSOR) MISC    Continuous Blood Gluc Transmit (DEXCOM G6 TRANSMITTER) MISC    fluticasone (FLONASE) 50 MCG/ACT nasal spray    fluticasone-salmeterol (ADVAIR DISKUS) 100-50 MCG/ACT inhaler    metFORMIN (GLUCOPHAGE XR) 500 MG 24 hr tablet    modafinil (PROVIGIL) 200 MG tablet    montelukast (SINGULAIR) 10 MG tablet    MULTI-VITAMIN OR TABS    sertraline (ZOLOFT) 50 MG tablet    sildenafil (REVATIO) 20 MG tablet    SM ALLERGY RELIEF D  MG 24 hr tablet    thin (NO BRAND SPECIFIED) lancets    triamcinolone (KENALOG) 0.1 % external ointment    valACYclovir (VALTREX) 500 MG tablet     No current facility-administered medications for this visit.       Social History:  The patient works as a [employer] business. Patient has the following hobbies or non-occupational exposure: outdoor    Family History:  Family History   Problem Relation Age of Onset    Diabetes Mother         D: 40 pneumonia,  Type I DM    Family History Negative Father     Family History Negative Brother        Previous Labs, Allergy Tests, Dermatopathology, Imaging:  Seen by Dr. Champagne     Seasonal allergic rhinitis due to pollen  Allergic rhinitis due to animals  Allergic rhinitis caused by mold  Allergic rhinitis due to dust mite  Allergic conjunctivitis, bilateral     Symptoms are frequently suboptimally controlled.  He states that he does not have time for allergen immunotherapy.  - Increase intranasal fluticasone to 2 sprays in each nostril once daily.  For short time, he can increase the dose to 2 sprays in each nostril twice daily.  - Increase azelastine to 2 sprays in each nostril twice daily as needed.  - Take Claritin-D once daily as needed.  - He can substitute intranasal fluticasone with budesonide irrigations prescribed by ENT in the past.  - Continue Optivar 1 drop in each eye twice daily as needed.     -  azelastine (OPTIVAR) 0.05 % ophthalmic solution  Dispense: 6 mL; Refill: 3  - loratadine-pseudoePHEDrine (CLARITIN-D 24-HOUR)  MG 24 hr tablet  Dispense: 30 tablet; Refill: 1  - fluticasone (FLONASE) 50 MCG/ACT nasal spray  Dispense: 16 g; Refill: 11  - azelastine (ASTELIN) 0.1 % nasal spray  Dispense: 30 mL; Refill: 11     Mild persistent asthma without complication  He reports the symptoms are fairly well controlled with Advair 100/50 mcg 1 puff once-twice daily, montelukast 10 mg by mouth once daily, and albuterol as needed.  On the other hand, spirometry in 2022 oh suggested moderate obstruction with postbronchodilator response.  - Continue Advair 100/50 mcg 1 puff twice daily and montelukast 10 mg by mouth once daily at bedtime.  - Continue albuterol as needed.  - Ordered interval spirometry.  If still moderately obstructed, may consider increasing the dose of ICS.     - fluticasone-salmeterol (ADVAIR DISKUS) 100-50 MCG/ACT inhaler  Dispense: 60 each; Refill: 5  - General PFT Lab (Please always keep checked)  - Pulmonary Function Test     Dermatitis  Psoriasis versus eczema versus fungal.  - Trial of clobetasol.  If not helpful, recommend to see Dermatology.     - clobetasol (TEMOVATE) 0.05 % external ointment  Dispense: 60 g; Refill: 3    In lab tests total igE 1,811 and very high to various pollens (birch more than 100 and ragweed 20 to 50 and and grass pollens around 20). However, dust mites, dogs, cats and molds not very high    Referred By: Referred Self, MD  No address on file     Allergy Tests:    Past Allergy Test    Order for Future Allergy Testing:    [] Outpatient  [] Inpatient: [facility]..../ Bed ....       Skin Atopy (atopic dermatitis) [] Yes   [] No .........  Contact allergies:   [] Yes   [] No ..........  Hand eczema:   [] Yes   [] No           Leading hand:   [] R   [] L       [] Ambidextrous         Drug allergies:        [] Yes   [] No  which?......    Urticaria/Angioedema  [] Yes   [] No  .........  Food Allergy:  [] Yes   [] No  which?......  Pets :  [] Yes   [] No  which?......         []  Rhinitis   [] Conjunctivitis   [] Sinusitis   [] Polyposis   [] Otitis   [] Pharyngitis         []  Postnasal drip    []  none  Operations:   [] Tonsils   [] Septum   [] Sinus   [] Polyposis        [] Asthma bronchiale   [] Coughing      []  none  Symptoms (mostly Rhinoconjunctivitis and Asthma) aggravated by:  Season   [] I   [] II   [] III   [] IV   []V   []VI   []VII   []VIII   []IX   []X   []XI   []XII     [] perennial   Day time      [] morning   [] noon      [] evening        [] night    [] whole day........  []  none  Location/changes    [] inside        [] outside   [] mountains    [] sea     [] others.............   []  none  Triggers, specific     [] animals     [] plants     [] dust              [] others ...........................    []  none  Triggers, others       [] work          [] psyche    [] sport            [] others .............................  []  none  Irritant                [] phys efforts [] smoke    [] heat/cold     [] odors  []others............... []  none    Order for PATCH TESTS  Reason for tests (suspected allergy): not necessary  Known previous allergies: n/a  Standardized panels  [] Standard panel (40 tests)  [] Preservatives & Antimicrobials (31 tests)  [] Emulsifiers & Additives (25 tests)   [] Perfumes/Flavours & Plants (25 tests)  [] Hairdresser panel (12 tests)  [] Rubber Chemicals (22 tests)  [] Plastics (26 tests)  [] Colorants/Dyes/Food additives (20 tests)  [] Metals (implants/dental) (24 tests)  [] Local anaesthetics/NSAIDs (13 tests)  [] Antibiotics & Antimycotics (14 tests)   [] Corticosteroids (15 tests)   [] Photopatch test (62 tests)   [] others: ...      [] Patient's own products: ...  DO NOT test if chemical or biological identity is unknown!   always ask from patient the product information and safety sheets (MSDS)       Order for PRICK TESTS    Reason for  tests (suspected allergy): severe allergies  Known previous allergies: see blood tests by Dr. Champagne    Standardized prick panels  [] Atopic panel (20 tests)  [] Pediatric Panel (12 tests)  [] Milk, Meat, Eggs, Grains (20 tests)   [] Dust, Epithelia, Feathers (10 tests)  [] Fish, Seafood, Shellfish (17 tests)  [] Nuts, Beans (14 tests)  [] Spice, Vegetable, Fruit (17 tests)  [] Pollen Panel = Tree, Grass, Weed (24 tests)  [] Others: ...      [] Patient's own products: ...  DO NOT test if chemical or biological identity is unknown!   always ask from patient the product information and safety sheets (MSDS)     Standardized intradermal tests  [] Penicillium notatum [] Aspergillus fumigatus [] House dust mites D.far & D. pteron  [] Cat    [] dog  [] Others: ...  [] Bee venom   [] Wasp venom  !!Specific protocol with dilutions!!       Order for Drug allergy tests (prick & Intradermal &  patch tests)    [] Penicillin G  [] Ampicillin [] Cefazolin   [] Ceftriaxone   [] Ceftazidime  [] Bactrim    [] Others: ...  Order for ... as test date    [] Patient needs consultation with Allergy team (changes of tests may apply)  [] Tests discussed with Allergy team (can have direct appointment for allergy tests)     ________________________________    Assessment & Plan:    ==> Final Diagnosis:     # atopy with   Severe seasonal RC and Asthma from spring to summer and sensitization to various pollens  * chronic illness with exacerbation, progression, side effects from treatment      These conclusions are made at the best of one's knowledge and belief based on the provided evidence such as patient's history and allergy test results and they can change over time or can be incomplete because of missing information's.    ==> Treatment Plan:  >> increase antihistamines to 2xdaily (maybe Allegra 180mg or Xyzal 5mg), continue with Symbicort 160 (instead of Advair), Azelastin nasal spray and eye drops  >> patient wanted kenalog shot (would  prefer for 2 weeks oral Prednisone starting at 50mg and reducing 5mg daily      Staff:  Provider    Follow-up in Derm-Allergy clinic in winter to perform skin tests and evaluate the situation (maybe rediscuss IT)    I spent a total of 34 minutes with Silviano Collins. This time was spent counseling the patient and/or coordinating care, explaining the allergy tests

## 2023-11-03 DIAGNOSIS — J45.30 MILD PERSISTENT ASTHMA WITHOUT COMPLICATION: ICD-10-CM

## 2023-11-06 RX ORDER — CEPHALEXIN 250 MG/1
1 CAPSULE ORAL EVERY 12 HOURS
Qty: 60 EACH | Refills: 2 | Status: SHIPPED | OUTPATIENT
Start: 2023-11-06 | End: 2024-03-06

## 2023-11-07 ENCOUNTER — TELEPHONE (OUTPATIENT)
Dept: FAMILY MEDICINE | Facility: CLINIC | Age: 48
End: 2023-11-07

## 2023-11-07 NOTE — TELEPHONE ENCOUNTER
Order/Referral Request    Who is requesting: diabetic education    Orders being requested: diabetic education    Reason service is needed/diagnosis: diabetes    When are orders needed by: asap    Has this been discussed with Provider: Yes    Does patient have a preference on a Group/Provider/Facility? [facility]    Does patient have an appointment scheduled?: No    Where to send orders: Place orders within Epic    Could we send this information to you in [facility] or would you prefer to receive a phone call?:   Patient would prefer a phone call   Okay to leave a detailed message?: Yes at Cell number on file:    Telephone Information:   Mobile [phone]

## 2023-11-09 DIAGNOSIS — E11.65 UNCONTROLLED TYPE 2 DIABETES MELLITUS WITH HYPERGLYCEMIA (H): Primary | ICD-10-CM

## 2023-11-10 ENCOUNTER — VIRTUAL VISIT (OUTPATIENT)
Dept: PSYCHOLOGY | Facility: CLINIC | Age: 48
End: 2023-11-10
Payer: COMMERCIAL

## 2023-11-10 DIAGNOSIS — F43.23 ADJUSTMENT DISORDER WITH MIXED ANXIETY AND DEPRESSED MOOD: Primary | ICD-10-CM

## 2023-11-10 PROCEDURE — 90847 FAMILY PSYTX W/PT 50 MIN: CPT | Mod: VID | Performed by: MARRIAGE & FAMILY THERAPIST

## 2023-11-10 ASSESSMENT — ANXIETY QUESTIONNAIRES
1. FEELING NERVOUS, ANXIOUS, OR ON EDGE: NOT AT ALL
6. BECOMING EASILY ANNOYED OR IRRITABLE: NOT AT ALL
2. NOT BEING ABLE TO STOP OR CONTROL WORRYING: NOT AT ALL
7. FEELING AFRAID AS IF SOMETHING AWFUL MIGHT HAPPEN: NOT AT ALL
GAD7 TOTAL SCORE: 0
5. BEING SO RESTLESS THAT IT IS HARD TO SIT STILL: NOT AT ALL
4. TROUBLE RELAXING: NOT AT ALL
IF YOU CHECKED OFF ANY PROBLEMS ON THIS QUESTIONNAIRE, HOW DIFFICULT HAVE THESE PROBLEMS MADE IT FOR YOU TO DO YOUR WORK, TAKE CARE OF THINGS AT HOME, OR GET ALONG WITH OTHER PEOPLE: NOT DIFFICULT AT ALL
GAD7 TOTAL SCORE: 0
3. WORRYING TOO MUCH ABOUT DIFFERENT THINGS: NOT AT ALL

## 2023-11-10 ASSESSMENT — PATIENT HEALTH QUESTIONNAIRE - PHQ9
SUM OF ALL RESPONSES TO PHQ QUESTIONS 1-9: 0
SUM OF ALL RESPONSES TO PHQ QUESTIONS 1-9: 0
10. IF YOU CHECKED OFF ANY PROBLEMS, HOW DIFFICULT HAVE THESE PROBLEMS MADE IT FOR YOU TO DO YOUR WORK, TAKE CARE OF THINGS AT HOME, OR GET ALONG WITH OTHER PEOPLE: NOT DIFFICULT AT ALL

## 2023-11-10 NOTE — PROGRESS NOTES
[facility]                                     Progress Note    Patient Name: Silviano Collins  Date: 11/1023         Service Type: Family with client present      Session Start Time: 9:30  Session End Time: 10:22     Session Length: 52    Session #: 2    Attendees: Client and Spouse / Significant Other    Service Modality:  Video Visit:      Provider verified identity through the following two step process.  Patient provided:  Patient is known previously to provider and Patient was verified at admission/transfer    Telemedicine Visit: The patient's condition can be safely assessed and treated via synchronous audio and visual telemedicine encounter.      Reason for Telemedicine Visit: Services only offered telehealth    Originating Site (Patient Location): Patient's other home and vehice    Distant Site (Provider Location): Provider Remote Setting- Home Office    Consent:  The patient/guardian has verbally consented to: the potential risks and benefits of telemedicine (video visit) versus in person care; bill my insurance or make self-payment for services provided; and responsibility for payment of non-covered services.     Patient would like the video invitation sent by:  My Chart    Mode of Communication:  Video Conference via [facility]    Distant Location (Provider):  Off-site    As the provider I attest to compliance with applicable laws and regulations related to telemedicine.    DATA  Interactive Complexity: No  Crisis: No        Progress Since Last Session (Related to Symptoms / Goals / Homework):   Symptoms: Improving interpersonal functioning/relationship.    Homework: Partially completed      Episode of Care Goals: Satisfactory progress - ACTION (Actively working towards change); Intervened by reinforcing change plan / affirming steps taken     Current / Ongoing Stressors and Concerns:   Patient and spouse reported regarding ongoing though improved relationship issues.  Patient  inquired about anger management services.  Patient and spouse expressed concerns about their adult son with with special needs and high anxiety/ADHD not being currently medicated and not wanting to connect with a provider.     Treatment Objective(s) Addressed in This Session:   use cognitive strategies identified in therapy to challenge anxious thoughts  learn & utilize at least some assertive communication skills weekly     Intervention:   CBT: reviewed with patient/spouse defocusing away from others' thoughts/opinions about them.  Interpersonal Therapy: re-reviewed with patient/spouse using active/reflective listening skills/strategies to improve effective communication/stress response and mutual understanding, and connecting during weekly check-in times.  Solution Focused: reviewed with patient/spouse looking into providers for their adult son.    Assessments completed prior to visit:  The following assessments were completed by patient for this visit:  PHQ9:       8/6/2023     8:49 PM 9/19/2023     1:13 PM 9/25/2023    11:57 AM 11/10/2023     9:22 AM   PHQ-9 SCORE   PHQ-9 Total Score MyChart 1 (Minimal depression) 2 (Minimal depression) 0 0   PHQ-9 Total Score 1 2 0 0     GAD7:       9/25/2023    11:58 AM 11/10/2023     9:23 AM   SOLIS-7 SCORE   Total Score 0 (minimal anxiety) 0 (minimal anxiety)   Total Score 0 0         ASSESSMENT: Current Emotional / Mental Status (status of significant symptoms):   Risk status (Self / Other harm or suicidal ideation)   Patient denies current fears or concerns for personal safety.   Patient denies current or recent suicidal ideation or behaviors.   Patient denies current or recent homicidal ideation or behaviors.   Patient denies current or recent self injurious behavior or ideation.   Patient denies other safety concerns.   Patient reports there has been no change in risk factors since their last session.     Patient reports there has been no change in protective factors since  their last session.     Recommended that patient call 911 or go to the local ED should there be a change in any of these risk factors.     Appearance:   Appropriate    Eye Contact:   Good    Psychomotor Behavior: Normal    Attitude:   Cooperative  Interested Friendly Pleasant Attentive   Orientation:   All   Speech    Rate / Production: Normal/ Responsive Talkative    Volume:  Normal    Mood:    Anxious  Normal   Affect:    Appropriate  Subdued  Worrisome    Thought Content:  Clear  Rumination    Thought Form:  Coherent  Goal Directed  Logical    Insight:    Good  and Intellectual Insight     Medication Review:   No changes to current psychiatric medication(s)     Medication Compliance:   Yes     Changes in Health Issues:   None reported     Chemical Use Review:   Substance Use: Chemical use reviewed, no active concerns identified      Tobacco Use: No current tobacco use.      Diagnosis:  1. Adjustment disorder with mixed anxiety and depressed mood        Collateral Reports Completed:   Not Applicable    PLAN: (Patient Tasks / Therapist Tasks / Other)  Patient/spouse agreed to work on defocusing away from others' thoughts/opinions about them, using active/reflective listening skills/strategies to improve effective communication/stress response and mutual understanding, and connecting during weekly check-in times, and looking into providers for their adult son.        CAROLYN Elias 11/10/23                                                         ______________________________________________________________________    Family with client present Treatment Plan    Patient's Name: Silviano Collins  YOB: 1975    Date of Creation: 9/25/23  Date Treatment Plan Last Reviewed/Revised: 9/25/23, next due 12/25/23.    DSM5 Diagnoses: Adjustment Disorders  309.28 (F43.23) With mixed anxiety and depressed mood  Psychosocial / Contextual Factors: relationship conflict related to infidelity  PROMIS  (reviewed every 90 days): 27 (9/17/23)    Referral / Collaboration:  Referral to another professional/service is not indicated at this time..    Anticipated number of session for this episode of care:  11-20  Anticipation frequency of session: Every other week  Anticipated Duration of each session:  38-53+  Treatment plan will be reviewed in 90 days or when goals have been changed.       MeasurableTreatment Goal(s) related to diagnosis / functional impairment(s)  Goal 1: Patient and spouse will improve interpersonal functioning/relationship by 2 points on a 1-10 Likert scale per self-report (10 = optimal interpersonal functioning).    I will know I've met my goal when my relationship have improved and stabilized, marriage intact.      Objective #A (Client Action)    Status: New - Date: 9/25/23     Patient will learn & utilize at least some assertive communication skills weekly.    Intervention(s)  Therapist will teach assertiveness skills. Nonviolent Communication and DBT Interpersonal Effectiveness skills..    Objective #B  Patient will practice setting boundaries some times in the next 12 weeks.    Status: New - Date: 9/25/23     Intervention(s)  Therapist will teach assertiveness skills. [place]ill-setting.    Objective #C  Patient and will spouse will engage in positive experiences with each other.  Status:  New - Date: 9/25/23    Intervention(s)  Therapist will teach strategies for connection/increasing intimacy.    Patient and significant other/spouse have reviewed and agreed to the above plan.      Nagi Euceda, LMFT  September 25, 2023

## 2023-11-20 ENCOUNTER — IMMUNIZATION (OUTPATIENT)
Dept: NURSING | Facility: CLINIC | Age: 48
End: 2023-11-20
Payer: COMMERCIAL

## 2023-11-20 PROCEDURE — 91320 SARSCV2 VAC 30MCG TRS-SUC IM: CPT

## 2023-11-20 PROCEDURE — 90686 IIV4 VACC NO PRSV 0.5 ML IM: CPT

## 2023-11-20 PROCEDURE — 90480 ADMN SARSCOV2 VAC 1/ONLY CMP: CPT

## 2023-11-20 PROCEDURE — 90471 IMMUNIZATION ADMIN: CPT

## 2023-11-21 ENCOUNTER — VIRTUAL VISIT (OUTPATIENT)
Dept: PSYCHOLOGY | Facility: CLINIC | Age: 48
End: 2023-11-21
Payer: COMMERCIAL

## 2023-11-21 DIAGNOSIS — F43.23 ADJUSTMENT DISORDER WITH MIXED ANXIETY AND DEPRESSED MOOD: Primary | ICD-10-CM

## 2023-11-21 PROCEDURE — 90834 PSYTX W PT 45 MINUTES: CPT | Mod: VID | Performed by: MARRIAGE & FAMILY THERAPIST

## 2023-11-21 ASSESSMENT — PATIENT HEALTH QUESTIONNAIRE - PHQ9
10. IF YOU CHECKED OFF ANY PROBLEMS, HOW DIFFICULT HAVE THESE PROBLEMS MADE IT FOR YOU TO DO YOUR WORK, TAKE CARE OF THINGS AT HOME, OR GET ALONG WITH OTHER PEOPLE: NOT DIFFICULT AT ALL
SUM OF ALL RESPONSES TO PHQ QUESTIONS 1-9: 0
SUM OF ALL RESPONSES TO PHQ QUESTIONS 1-9: 0

## 2023-11-21 NOTE — PROGRESS NOTES
[facility]                                     Progress Note    Patient Name: Silviano Collins  Date: 11/2123         Service Type: Individual      Session Start Time: 10:00  Session End Time: 10:45     Session Length: 45    Session #: 3    Attendees: Client attended alone    Service Modality:  Video Visit:      Provider verified identity through the following two step process.  Patient provided:  Patient is known previously to provider and Patient was verified at admission/transfer    Telemedicine Visit: The patient's condition can be safely assessed and treated via synchronous audio and visual telemedicine encounter.      Reason for Telemedicine Visit: Services only offered telehealth    Originating Site (Patient Location): Patient's other home and vehice    Distant Site (Provider Location): Provider Remote Setting- Home Office    Consent:  The patient/guardian has verbally consented to: the potential risks and benefits of telemedicine (video visit) versus in person care; bill my insurance or make self-payment for services provided; and responsibility for payment of non-covered services.     Patient would like the video invitation sent by:  My Chart    Mode of Communication:  Video Conference via [facility]    Distant Location (Provider):  Off-site    As the provider I attest to compliance with applicable laws and regulations related to telemedicine.    DATA  Interactive Complexity: No  Crisis: No        Progress Since Last Session (Related to Symptoms / Goals / Homework):   Symptoms: Improving interpersonal functioning/relationship.    Homework: Partially completed      Episode of Care Goals: Satisfactory progress - ACTION (Actively working towards change); Intervened by reinforcing change plan / affirming steps taken     Current / Ongoing Stressors and Concerns:   Patient reported regarding past experience of anger issue related to his spouse's affair.  Patient noted this as a single  occurrence, with no difficulties managing anger before or after this event, but did identify some ongoing and upcoming stressors.     Treatment Objective(s) Addressed in This Session:   identify at least some techniques for intervening on the escalation     Intervention:   CBT: taught/reviewed with patient behavioral triad for understanding relationships between thoughts, feelings, and actions, and noticing anger and its message and associated typical and alternative responses to it, focusing on doing what he can/control while defocusing/letting go of things outside of his control.    Assessments completed prior to visit:  The following assessments were completed by patient for this visit:  PHQ9:       8/6/2023     8:49 PM 9/19/2023     1:13 PM 9/25/2023    11:57 AM 11/10/2023     9:22 AM 11/21/2023     8:05 AM   PHQ-9 SCORE   PHQ-9 Total Score MyChart 1 (Minimal depression) 2 (Minimal depression) 0 0 0   PHQ-9 Total Score 1 2 0 0 0     GAD7:       9/25/2023    11:58 AM 11/10/2023     9:23 AM   SOLIS-7 SCORE   Total Score 0 (minimal anxiety) 0 (minimal anxiety)   Total Score 0 0         ASSESSMENT: Current Emotional / Mental Status (status of significant symptoms):   Risk status (Self / Other harm or suicidal ideation)   Patient denies current fears or concerns for personal safety.   Patient denies current or recent suicidal ideation or behaviors.   Patient denies current or recent homicidal ideation or behaviors.   Patient denies current or recent self injurious behavior or ideation.   Patient denies other safety concerns.   Patient reports there has been no change in risk factors since their last session.     Patient reports there has been no change in protective factors since their last session.     Recommended that patient call 911 or go to the local ED should there be a change in any of these risk factors.     Appearance:   Appropriate    Eye Contact:   Good    Psychomotor Behavior: Normal     Attitude:   Cooperative  Interested Friendly Pleasant Attentive   Orientation:   All   Speech    Rate / Production: Normal/ Responsive Talkative    Volume:  Normal    Mood:    Anxious  Normal   Affect:    Appropriate  Subdued  Worrisome    Thought Content:  Clear  Rumination    Thought Form:  Coherent  Goal Directed  Logical    Insight:    Good  and Intellectual Insight     Medication Review:   No changes to current psychiatric medication(s)     Medication Compliance:   Yes     Changes in Health Issues:   None reported     Chemical Use Review:   Substance Use: Chemical use reviewed, no active concerns identified      Tobacco Use: No current tobacco use.      Diagnosis:  1. Adjustment disorder with mixed anxiety and depressed mood          Collateral Reports Completed:   Not Applicable    PLAN: (Patient Tasks / Therapist Tasks / Other)  Patient agreed to work on noticing anger and its message and associated typical and alternative responses to it, focusing on doing what he can/control while defocusing/letting go of things outside of his control.        Nagi Euceda, LMFT 11/21/23                                                         ______________________________________________________________________    Family with client present Treatment Plan    Patient's Name: Silviano Collins  YOB: 1975    Date of Creation: 9/25/23  Date Treatment Plan Last Reviewed/Revised: 9/25/23, next due 12/25/23.    DSM5 Diagnoses: Adjustment Disorders  309.28 (F43.23) With mixed anxiety and depressed mood  Psychosocial / Contextual Factors: relationship conflict related to infidelity  PROMIS (reviewed every 90 days): 27 (9/17/23)    Referral / Collaboration:  Referral to another professional/service is not indicated at this time..    Anticipated number of session for this episode of care:  11-20  Anticipation frequency of session: Every other week  Anticipated Duration of each session:  38-53+  Treatment  plan will be reviewed in 90 days or when goals have been changed.       MeasurableTreatment Goal(s) related to diagnosis / functional impairment(s)  Goal 1: Patient and spouse will improve interpersonal functioning/relationship by 2 points on a 1-10 Likert scale per self-report (10 = optimal interpersonal functioning).    I will know I've met my goal when my relationship have improved and stabilized, marriage intact.      Objective #A (Client Action)    Status: New - Date: 9/25/23     Patient will learn & utilize at least some assertive communication skills weekly.    Intervention(s)  Therapist will teach assertiveness skills. Nonviolent Communication and DBT Interpersonal Effectiveness skills..    Objective #B  Patient will practice setting boundaries some times in the next 12 weeks.    Status: New - Date: 9/25/23     Intervention(s)  Therapist will teach assertiveness skills. [place]-setting.    Objective #C  Patient and will spouse will engage in positive experiences with each other.  Status:  New - Date: 9/25/23    Intervention(s)  Therapist will teach strategies for connection/increasing intimacy.    Patient and significant other/spouse have reviewed and agreed to the above plan.      Nagi Euceda, LMFT  September 25, 2023

## 2023-11-25 ENCOUNTER — HEALTH MAINTENANCE LETTER (OUTPATIENT)
Age: 48
End: 2023-11-25

## 2023-11-27 DIAGNOSIS — J30.89 ALLERGIC RHINITIS DUE TO DUST MITE: ICD-10-CM

## 2023-11-27 DIAGNOSIS — J45.20 MILD INTERMITTENT ASTHMA, UNSPECIFIED WHETHER COMPLICATED: ICD-10-CM

## 2023-11-27 DIAGNOSIS — H10.13 ALLERGIC CONJUNCTIVITIS, BILATERAL: ICD-10-CM

## 2023-11-27 RX ORDER — LEVOCETIRIZINE DIHYDROCHLORIDE 5 MG/1
5 TABLET, FILM COATED ORAL 2 TIMES DAILY
Qty: 60 TABLET | Refills: 3 | OUTPATIENT
Start: 2023-11-27

## 2023-11-28 ENCOUNTER — MYC REFILL (OUTPATIENT)
Dept: ALLERGY | Facility: CLINIC | Age: 48
End: 2023-11-28

## 2023-11-28 DIAGNOSIS — J45.20 MILD INTERMITTENT ASTHMA, UNSPECIFIED WHETHER COMPLICATED: ICD-10-CM

## 2023-11-28 DIAGNOSIS — H10.13 ALLERGIC CONJUNCTIVITIS, BILATERAL: ICD-10-CM

## 2023-11-28 DIAGNOSIS — J30.89 ALLERGIC RHINITIS DUE TO DUST MITE: ICD-10-CM

## 2023-11-28 RX ORDER — LEVOCETIRIZINE DIHYDROCHLORIDE 5 MG/1
5 TABLET, FILM COATED ORAL 2 TIMES DAILY
Qty: 60 TABLET | Refills: 3 | Status: SHIPPED | OUTPATIENT
Start: 2023-11-28 | End: 2024-03-22

## 2023-11-29 DIAGNOSIS — J30.89 ALLERGIC RHINITIS DUE TO DUST MITE: ICD-10-CM

## 2023-11-29 DIAGNOSIS — H10.13 ALLERGIC CONJUNCTIVITIS, BILATERAL: ICD-10-CM

## 2023-11-29 DIAGNOSIS — J45.20 MILD INTERMITTENT ASTHMA, UNSPECIFIED WHETHER COMPLICATED: ICD-10-CM

## 2023-11-29 RX ORDER — LEVOCETIRIZINE DIHYDROCHLORIDE 5 MG/1
5 TABLET, FILM COATED ORAL 2 TIMES DAILY
Qty: 60 TABLET | Refills: 3 | OUTPATIENT
Start: 2023-11-29

## 2023-12-06 ENCOUNTER — VIRTUAL VISIT (OUTPATIENT)
Dept: EDUCATION SERVICES | Facility: CLINIC | Age: 48
End: 2023-12-06
Payer: COMMERCIAL

## 2023-12-06 ENCOUNTER — LAB (OUTPATIENT)
Dept: LAB | Facility: HOSPITAL | Age: 48
End: 2023-12-06
Payer: COMMERCIAL

## 2023-12-06 DIAGNOSIS — E11.65 UNCONTROLLED TYPE 2 DIABETES MELLITUS WITH HYPERGLYCEMIA (H): ICD-10-CM

## 2023-12-06 LAB — HBA1C MFR BLD: 10.6 %

## 2023-12-06 PROCEDURE — G0108 DIAB MANAGE TRN  PER INDIV: HCPCS | Mod: VID | Performed by: DIETITIAN, REGISTERED

## 2023-12-06 PROCEDURE — 83036 HEMOGLOBIN GLYCOSYLATED A1C: CPT

## 2023-12-06 PROCEDURE — 36415 COLL VENOUS BLD VENIPUNCTURE: CPT

## 2023-12-06 NOTE — PROGRESS NOTES
Diabetes Self-Management Education & Support    Presents for: Individual review    Type of service:  Video Visit    If the video visit is dropped, the video visit invitation should be resent by: Text to cell phone: [phone]    Originating Location (pt. Location): Work  Distant Location (provider location): Offsite  Mode of Communication:  Video Conference via [employer]    Video Start Time:  12:59 PM  Video End Time (time video stopped): 1:31 PM    How would patient like to obtain AVS? MyChart      ASSESSMENT:  Met with patient regarding his diabetes. Patient reports feeling thirsty, more tired and urinating more. Patient reports finally putting the Dexcom G6 monitor on he got a while back and saw BG in the high 300's and 400 range. Reviewed importance of checking BG - patient plans to put Dexcom back on - tips given to keep it in place. Reviewed BG goals and medications.    Patient never started metformin due to hearing bad things from friends. Reviewed importance of complying with medications - patient is agreeable to start metformin now. We will discuss other medications once we have more BG to review.   Reviewed food choices very briefly - discuss at next visit further.    Patient's most recent   Lab Results   Component Value Date    A1C 8.2 08/16/2023     is not meeting goal of <7.0    Diabetes knowledge and skills assessment:   Patient is knowledgeable in diabetes management concepts related to: Healthy Coping    Continue education with the following diabetes management concepts: Healthy Eating, Being Active, Monitoring, Taking Medication, Problem Solving, Reducing Risks, and Healthy Coping    Based on learning assessment above, most appropriate setting for further diabetes education would be: Individual setting.      PLAN  Check BG 2-3 times daily with CGM  Connect Dexcom with [place] to share data with providers  Start Metformin 500 mg/day x 1 week then increase to 2 tablets/day  Increase  "activity  Check A1C - ordered  Topics to cover at upcoming visits: Healthy Eating, Being Active, Monitoring, Taking Medication, Problem Solving, Reducing Risks, and Healthy Coping    Follow-up: 4 weeks    See Care Plan for co-developed, patient-state behavior change goals.  AVS provided for patient today.    Education Materials Provided:  No new materials provided today      SUBJECTIVE/OBJECTIVE:  Presents for: Individual review  Accompanied by: Self  Diabetes education in the past 24mo: No  Focus of Visit: Monitoring, Taking Medication  Diabetes type: Type 2  Date of diagnosis: 3 years ago  Disease course: Worsening  How confident are you filling out medical forms by yourself:: Extremely  Diabetes management related comments/concerns: medication and monitoring  Transportation concerns: No  Difficulty affording diabetes medication?: No  Difficulty affording diabetes testing supplies?: No  Other concerns:: None  Cultural Influences/Ethnic Background:  Choose not to answer    Diabetes Symptoms & Complications:  Fatigue: Sometimes  Neuropathy: No  Polydipsia: Yes  Polyphagia: No  Polyuria: Yes  Visual change: Yes  Slow healing wounds: Yes  Complications assessed today?: Yes  Autonomic neuropathy: No  CVA: No  Heart disease: No  Nephropathy: No  Peripheral neuropathy: No  Peripheral Vascular Disease: No  Retinopathy: No  Sexual dysfunction: No    Patient Problem List and Family Medical History reviewed for relevant medical history, current medical status, and diabetes risk factors.    Vitals:  There were no vitals taken for this visit.  Estimated body mass index is 31.1 kg/m  as calculated from the following:    Height as of 6/26/23: 1.803 m (5' 11\").    Weight as of 6/26/23: 101.2 kg (223 lb).   Last 3 BP:   BP Readings from Last 3 Encounters:   06/26/23 (!) 159/92   05/11/23 128/82   03/14/23 (!) 145/89       History   Smoking Status    Former   Smokeless Tobacco    Never       Labs:  Lab Results   Component Value " "Date    A1C 8.2 08/16/2023     Lab Results   Component Value Date     08/16/2023     06/21/2022     Lab Results   Component Value Date     06/29/2022     06/21/2022     Direct Measure HDL   Date Value Ref Range Status   06/29/2022 69 >=40 mg/dL Final   ]  GFR Estimate   Date Value Ref Range Status   08/16/2023 >90 >60 mL/min/1.73m2 Final     No results found for: \"GFRESTBLACK\"  Lab Results   Component Value Date    CR 0.81 08/16/2023     No results found for: \"MICROALBUMIN\"    Healthy Eating:  Healthy Eating Assessed Today: Yes  Meal planning/habits: None  Meals include: Breakfast, Lunch, Dinner  Beverages: Water  Has patient met with a dietitian in the past?: Yes    Being Active:  Being Active Assessed Today: Yes  Exercise:: Currently not exercising    Monitoring:  Monitoring Assessed Today: Yes  Blood Glucose Meter: CGM      Taking Medications:  Diabetes Medication(s)       Biguanides       metFORMIN (GLUCOPHAGE XR) 500 MG 24 hr tablet    Take 1 tablet (500 mg) by mouth 2 times daily (with meals)     Patient not taking: Reported on 12/6/2023            Taking Medication Assessed Today: Yes  Current Treatments: Diet    Problem Solving:                 Reducing Risks:  Reducing Risks Assessed Today: Yes  Diabetes Risks: Age over 45 years    Healthy Coping:  Healthy Coping Assessed Today: Yes  Emotional response to diabetes: Ready to learn  Stage of change: ACTION (Actively working towards change)  Patient Activation Measure Survey Score:       No data to display                  Care Plan and Education Provided:  Care Plan: Diabetes   Updates made by Lakshmi Blakely RD since 12/6/2023 12:00 AM        Problem: HbA1C Not In Goal         Goal: Establish Regular Follow-Ups with PCP         Task: Discuss with PCP the recommended timing for patient's next follow up visit(s)    Responsible User: Lakshmi Blakely RD        Task: Discuss schedule for PCP visits with patient    Responsible " User: Lakshmi Blakely RD        Goal: Get HbA1C Level in Goal         Task: Educate patient on diabetes education self-management topics    Responsible User: Lakshmi Blakely RD        Task: Educate patient on benefits of regular glucose monitoring    Responsible User: Lakshmi Blakely RD        Task: Refer patient to appropriate extended care team member, as needed (Medication Therapy Management, Behavioral Health, Physical Therapy, etc.)    Responsible User: Lakshmi Blakely RD        Task: Discuss diabetes treatment plan with patient    Responsible User: Lakshmi Blakely RD        Problem: Diabetes Self-Management Education Needed to Optimize Self-Care Behaviors         Goal: Understand diabetes pathophysiology and disease progression         Task: Provide education on diabetes pathophysiology and disease progression specfic to patient's diabetes type Completed 12/6/2023   Responsible User: Lakshmi Blakely RD        Goal: Healthy Eating - follow a healthy eating pattern for diabetes         Task: Provide education on portion control and consistency in amount, composition and timing of food intake    Responsible User: Lakshmi Blakely RD        Task: Provide education on managing carbohydrate intake (carbohydrate counting, plate planning method, etc.)    Responsible User: Lakshmi Blakely RD        Task: Provide education on weight management    Responsible User: Lakshmi Blakely RD        Task: Provide education on heart healthy eating    Responsible User: Lakshmi Blakely RD        Task: Provide education on eating out    Responsible User: Lakshmi Blakely RD        Task: Develop individualized healthy eating plan with patient    Responsible User: Lakshmi Blakely RD        Goal: Being Active - get regular physical activity, working up to at least 150 minutes per week         Task: Provide education on relationship of activity to glucose and precautions to take  if at risk for low glucose    Responsible User: Lakshmi Blakely RD        Task: Discuss barriers to physical activity with patient    Responsible User: Lakshmi Blakely RD        Task: Develop physical activity plan with patient    Responsible User: Lakshmi Blakely RD        Task: Explore community resources including walking groups, assistance programs, and home videos    Responsible User: Lakshmi Blakely RD        Goal: Monitoring - monitor glucose and ketones as directed    This Visit's Progress: 0%   Note:    Check BG 2-3 times daily with CGM       Task: Provide education on blood glucose monitoring (purpose, proper technique, frequency, glucose targets, interpreting results, when to use glucose control solution, sharps disposal)    Responsible User: Lakshmi Blakely RD        Task: Provide education on continuous glucose monitoring (sensor placement, use of lyle or /reader, understanding glucose trends, alerts and alarms, differences between sensor glucose and blood glucose) Completed 12/6/2023   Responsible User: Lakshmi Blakely RD        Task: Provide education on ketone monitoring (when to monitor, frequency, etc.)    Responsible User: Lakshmi Blakely RD        Goal: Taking Medication - patient is consistently taking medications as directed         Task: Provide education on action of prescribed medication, including when to take and possible side effects    Responsible User: Lakshmi Blakely RD        Task: Provide education on insulin and injectable diabetes medications, including administration, storage, site selection and rotation for injection sites    Responsible User: Lakshmi Blakely RD        Task: Discuss barriers to medication adherence with patient and provide management technique ideas as appropriate    Responsible User: Lakshmi Blakely RD        Task: Provide education on frequency and refill details of medications    Responsible User:  Lakshmi Blakely RD        Goal: Problem Solving - know how to prevent and manage short-term diabetes complications         Task: Provide education on high blood glucose - causes, signs/symptoms, prevention and treatment Completed 12/6/2023   Responsible User: Lakshmi Blakely RD        Task: Provide education on low blood glucose - causes, signs/symptoms, prevention, treatment, carrying a carbohydrate source at all times, and medical identification    Responsible User: Lakshmi Blakely RD        Task: Provide education on safe travel with diabetes    Responsible User: Lakshmi Blakely RD        Task: Provide education on how to care for diabetes on sick days    Responsible User: Lakshmi Blakely RD        Task: Provide education on when to call a health care provider    Responsible User: Lakshmi Blakely RD        Goal: Reducing Risks - know how to prevent and treat long-term diabetes complications         Task: Provide education on major complications of diabetes, prevention, early diagnostic measures and treatment of complications    Responsible User: Lakshmi Blakely RD        Task: Provide education on recommended care for dental, eye and foot health    Responsible User: Lakshmi Blakely RD        Task: Provide education on Hemoglobin A1c - goals and relationship to blood glucose levels Completed 12/6/2023   Responsible User: Lakshmi Blakely RD        Task: Provide education on recommendations for heart health - lipid levels and goals, blood pressure and goals, and aspirin therapy, if indicated    Responsible User: Lakshmi Blakely RD        Task: Provide education on tobacco cessation    Responsible User: Lakshmi Blakely RD        Goal: Healthy Coping - use available resources to cope with the challenges of managing diabetes         Task: Discuss recognizing feelings about having diabetes Completed 12/6/2023   Responsible User: Lakshmi Blakely RD        Task:  Provide education on the benefits of making appropriate lifestyle changes    Responsible User: Lakshmi Blakely RD        Task: Provide education on benefits of utilizing support systems    Responsible User: Lakshmi Blakely RD        Task: Discuss methods for coping with stress    Responsible User: Lakshmi Blakely RD        Task: Provide education on when to seek professional counseling    Responsible User: Lakshmi Blakely RD            Time Spent: 30 minutes  Encounter Type: Individual    Any diabetes medication dose changes were made via the CDE Protocol per the patient's referring provider. A copy of this encounter was shared with the provider.

## 2023-12-06 NOTE — LETTER
12/6/2023         RE: Silviano Collins  [address] [address]        Dear Colleague,    Thank you for referring your patient, Silviano Collins, to the [facility]. Please see a copy of my visit note below.    Diabetes Self-Management Education & Support    Presents for: Individual review    Type of service:  Video Visit    If the video visit is dropped, the video visit invitation should be resent by: Text to cell phone: [phone]    Originating Location (pt. Location): Work  Distant Location (provider location): Offsite  Mode of Communication:  Video Conference via [employer]    Video Start Time:  12:59 PM  Video End Time (time video stopped): 1:31 PM    How would patient like to obtain AVS? MyChart      ASSESSMENT:  Met with patient regarding his diabetes. Patient reports feeling thirsty, more tired and urinating more. Patient reports finally putting the Dexcom G6 monitor on he got a while back and saw BG in the high 300's and 400 range. Reviewed importance of checking BG - patient plans to put Dexcom back on - tips given to keep it in place. Reviewed BG goals and medications.    Patient never started metformin due to hearing bad things from friends. Reviewed importance of complying with medications - patient is agreeable to start metformin now. We will discuss other medications once we have more BG to review.   Reviewed food choices very briefly - discuss at next visit further.    Patient's most recent   Lab Results   Component Value Date    A1C 8.2 08/16/2023     is not meeting goal of <7.0    Diabetes knowledge and skills assessment:   Patient is knowledgeable in diabetes management concepts related to: Healthy Coping    Continue education with the following diabetes management concepts: Healthy Eating, Being Active, Monitoring, Taking Medication, Problem Solving, Reducing Risks, and Healthy Coping    Based on learning assessment above, most  "appropriate setting for further diabetes education would be: Individual setting.      PLAN  Check BG 2-3 times daily with CGM  Connect Dexcom with [employer] to share data with providers  Start Metformin 500 mg/day x 1 week then increase to 2 tablets/day  Increase activity  Check A1C - ordered  Topics to cover at upcoming visits: Healthy Eating, Being Active, Monitoring, Taking Medication, Problem Solving, Reducing Risks, and Healthy Coping    Follow-up: 4 weeks    See Care Plan for co-developed, patient-state behavior change goals.  AVS provided for patient today.    Education Materials Provided:  No new materials provided today      SUBJECTIVE/OBJECTIVE:  Presents for: Individual review  Accompanied by: Self  Diabetes education in the past 24mo: No  Focus of Visit: Monitoring, Taking Medication  Diabetes type: Type 2  Date of diagnosis: 3 years ago  Disease course: Worsening  How confident are you filling out medical forms by yourself:: Extremely  Diabetes management related comments/concerns: medication and monitoring  Transportation concerns: No  Difficulty affording diabetes medication?: No  Difficulty affording diabetes testing supplies?: No  Other concerns:: None  Cultural Influences/Ethnic Background:  Choose not to answer    Diabetes Symptoms & Complications:  Fatigue: Sometimes  Neuropathy: No  Polydipsia: Yes  Polyphagia: No  Polyuria: Yes  Visual change: Yes  Slow healing wounds: Yes  Complications assessed today?: Yes  Autonomic neuropathy: No  CVA: No  Heart disease: No  Nephropathy: No  Peripheral neuropathy: No  Peripheral Vascular Disease: No  Retinopathy: No  Sexual dysfunction: No    Patient Problem List and Family Medical History reviewed for relevant medical history, current medical status, and diabetes risk factors.    Vitals:  There were no vitals taken for this visit.  Estimated body mass index is 31.1 kg/m  as calculated from the following:    Height as of 6/26/23: 1.803 m (5' 11\").    Weight " "as of 6/26/23: 101.2 kg (223 lb).   Last 3 BP:   BP Readings from Last 3 Encounters:   06/26/23 (!) 159/92   05/11/23 128/82   03/14/23 (!) 145/89       History   Smoking Status     Former   Smokeless Tobacco     Never       Labs:  Lab Results   Component Value Date    A1C 8.2 08/16/2023     Lab Results   Component Value Date     08/16/2023     06/21/2022     Lab Results   Component Value Date     06/29/2022     06/21/2022     Direct Measure HDL   Date Value Ref Range Status   06/29/2022 69 >=40 mg/dL Final   ]  GFR Estimate   Date Value Ref Range Status   08/16/2023 >90 >60 mL/min/1.73m2 Final     No results found for: \"GFRESTBLACK\"  Lab Results   Component Value Date    CR 0.81 08/16/2023     No results found for: \"MICROALBUMIN\"    Healthy Eating:  Healthy Eating Assessed Today: Yes  Meal planning/habits: None  Meals include: Breakfast, Lunch, Dinner  Beverages: Water  Has patient met with a dietitian in the past?: Yes    Being Active:  Being Active Assessed Today: Yes  Exercise:: Currently not exercising    Monitoring:  Monitoring Assessed Today: Yes  Blood Glucose Meter: CGM      Taking Medications:  Diabetes Medication(s)       Biguanides       metFORMIN (GLUCOPHAGE XR) 500 MG 24 hr tablet    Take 1 tablet (500 mg) by mouth 2 times daily (with meals)     Patient not taking: Reported on 12/6/2023            Taking Medication Assessed Today: Yes  Current Treatments: Diet    Problem Solving:                 Reducing Risks:  Reducing Risks Assessed Today: Yes  Diabetes Risks: Age over 45 years    Healthy Coping:  Healthy Coping Assessed Today: Yes  Emotional response to diabetes: Ready to learn  Stage of change: ACTION (Actively working towards change)  Patient Activation Measure Survey Score:       No data to display                  Care Plan and Education Provided:  Care Plan: Diabetes   Updates made by Lakshmi Blakely RD since 12/6/2023 12:00 AM        Problem: HbA1C Not In Goal "         Goal: Establish Regular Follow-Ups with PCP         Task: Discuss with PCP the recommended timing for patient's next follow up visit(s)    Responsible User: Lakshmi Blakely RD        Task: Discuss schedule for PCP visits with patient    Responsible User: Lakshmi Blakely RD        Goal: Get HbA1C Level in Goal         Task: Educate patient on diabetes education self-management topics    Responsible User: Lakshmi Blakely RD        Task: Educate patient on benefits of regular glucose monitoring    Responsible User: Lakshmi Blakely RD        Task: Refer patient to appropriate extended care team member, as needed (Medication Therapy Management, Behavioral Health, Physical Therapy, etc.)    Responsible User: Lakshmi Blakely RD        Task: Discuss diabetes treatment plan with patient    Responsible User: Lakshmi Blakely RD        Problem: Diabetes Self-Management Education Needed to Optimize Self-Care Behaviors         Goal: Understand diabetes pathophysiology and disease progression         Task: Provide education on diabetes pathophysiology and disease progression specfic to patient's diabetes type Completed 12/6/2023   Responsible User: Lakshmi Blakely RD        Goal: Healthy Eating - follow a healthy eating pattern for diabetes         Task: Provide education on portion control and consistency in amount, composition and timing of food intake    Responsible User: Lakshmi Blakely RD        Task: Provide education on managing carbohydrate intake (carbohydrate counting, plate planning method, etc.)    Responsible User: Lakshmi Blakely RD        Task: Provide education on weight management    Responsible User: Lakshmi Blakely RD        Task: Provide education on heart healthy eating    Responsible User: Lakshmi Blakely RD        Task: Provide education on eating out    Responsible User: Lakshmi Blakely RD        Task: Develop individualized healthy eating  plan with patient    Responsible User: Lakshmi Blakely RD        Goal: Being Active - get regular physical activity, working up to at least 150 minutes per week         Task: Provide education on relationship of activity to glucose and precautions to take if at risk for low glucose    Responsible User: Lakshmi Blakely RD        Task: Discuss barriers to physical activity with patient    Responsible User: Lakshmi Blakely RD        Task: Develop physical activity plan with patient    Responsible User: Lakshmi Blakely RD        Task: Explore community resources including walking groups, assistance programs, and home videos    Responsible User: Lakshmi Blakely RD        Goal: Monitoring - monitor glucose and ketones as directed    This Visit's Progress: 0%   Note:    Check BG 2-3 times daily with CGM       Task: Provide education on blood glucose monitoring (purpose, proper technique, frequency, glucose targets, interpreting results, when to use glucose control solution, sharps disposal)    Responsible User: Lakshmi Blakely RD        Task: Provide education on continuous glucose monitoring (sensor placement, use of lyle or /reader, understanding glucose trends, alerts and alarms, differences between sensor glucose and blood glucose) Completed 12/6/2023   Responsible User: Lakshmi Blakely RD        Task: Provide education on ketone monitoring (when to monitor, frequency, etc.)    Responsible User: Lakshmi Blakely RD        Goal: Taking Medication - patient is consistently taking medications as directed         Task: Provide education on action of prescribed medication, including when to take and possible side effects    Responsible User: Lakshmi Blakely RD        Task: Provide education on insulin and injectable diabetes medications, including administration, storage, site selection and rotation for injection sites    Responsible User: Lakshmi Blakely RD         Task: Discuss barriers to medication adherence with patient and provide management technique ideas as appropriate    Responsible User: Lakshmi Blakely RD        Task: Provide education on frequency and refill details of medications    Responsible User: Lakshmi Blakely RD        Goal: Problem Solving - know how to prevent and manage short-term diabetes complications         Task: Provide education on high blood glucose - causes, signs/symptoms, prevention and treatment Completed 12/6/2023   Responsible User: Lakshmi Blakely RD        Task: Provide education on low blood glucose - causes, signs/symptoms, prevention, treatment, carrying a carbohydrate source at all times, and medical identification    Responsible User: Lakshmi Blakely RD        Task: Provide education on safe travel with diabetes    Responsible User: Lakshmi Blakely RD        Task: Provide education on how to care for diabetes on sick days    Responsible User: Lakshmi Blakely RD        Task: Provide education on when to call a health care provider    Responsible User: Lakshmi Blakely RD        Goal: Reducing Risks - know how to prevent and treat long-term diabetes complications         Task: Provide education on major complications of diabetes, prevention, early diagnostic measures and treatment of complications    Responsible User: Lakshmi Blakely RD        Task: Provide education on recommended care for dental, eye and foot health    Responsible User: Lakshmi Blakely RD        Task: Provide education on Hemoglobin A1c - goals and relationship to blood glucose levels Completed 12/6/2023   Responsible User: Lakshmi Blakely RD        Task: Provide education on recommendations for heart health - lipid levels and goals, blood pressure and goals, and aspirin therapy, if indicated    Responsible User: Lakshmi Blakely RD        Task: Provide education on tobacco cessation    Responsible User:  Lakshmi Blakely RD        Goal: Healthy Coping - use available resources to cope with the challenges of managing diabetes         Task: Discuss recognizing feelings about having diabetes Completed 12/6/2023   Responsible User: Lakshmi Blakely RD        Task: Provide education on the benefits of making appropriate lifestyle changes    Responsible User: Lakshmi Blakely RD        Task: Provide education on benefits of utilizing support systems    Responsible User: Lakshmi Blakely RD        Task: Discuss methods for coping with stress    Responsible User: Lakshmi Blakely RD        Task: Provide education on when to seek professional counseling    Responsible User: Lakshmi Blakely RD            Time Spent: 30 minutes  Encounter Type: Individual    Any diabetes medication dose changes were made via the CDE Protocol per the patient's referring provider. A copy of this encounter was shared with the provider.

## 2023-12-06 NOTE — PATIENT INSTRUCTIONS
Goals for Diabetes Care:    1. Eat 3 balanced meals each day - Monitor carb intake and aim for 45-60 grams per meal  This would be equal to about 4 choices of carbohydrates. Carbohydrate 1 choice = 15 grams  Aim to eat the plate method style - half your plate fruits/veggies, 1/4 the plate protein and 1/4 plate starch (rice, potato, pasta)    2. Check blood sugars at least 2- 3 times each day at varying times   Blood Glucose Targets:   1. Fasting and before meal target is 80 - 130   2. 2 hours after a meal target is < 180  Always remember to bring meter and log book to all appointments.    3. Activity really helps improve blood sugars. Try to Incorporate 30 minutes activity into each day - does not need to be all at one time & walking counts!    4. Treating low BG. Rule of 15 = BG 50-70 mg/dL = 15 gram carb (4 oz juice, 4 glucose tabs, OR 4 oz pop), then recheck BG in 15 minutes. If still low repeat. (If BG <50 mg/dL = 8 oz pop/juice or 8 glucose tabs).    5. Take diabetes medications as prescribed   -Metformin  mg/day once daily for one week at metformin  -After one week increase to 2 tablets at breakfast and continue      [employer]   Download - log in   Go to account settings> Connected Apps> enter in [id number] (share code)  Let me know when it is working and I will take a look to see if it is sharing with the clinic    Follow up with your Diabetic Educator to assess BG targets and need for modifications to medications and/or lifestyle.  Call with any questions.  Thank you!  Lakshmi Blakely RDN, LD, [facility]   Certified Diabetes Care &   [facility]  Triage [phone]

## 2023-12-07 ENCOUNTER — VIRTUAL VISIT (OUTPATIENT)
Dept: FAMILY MEDICINE | Facility: CLINIC | Age: 48
End: 2023-12-07
Payer: COMMERCIAL

## 2023-12-07 DIAGNOSIS — E11.65 TYPE 2 DIABETES MELLITUS WITH HYPERGLYCEMIA, WITHOUT LONG-TERM CURRENT USE OF INSULIN (H): Primary | ICD-10-CM

## 2023-12-07 DIAGNOSIS — E78.5 HYPERLIPIDEMIA, UNSPECIFIED HYPERLIPIDEMIA TYPE: Chronic | ICD-10-CM

## 2023-12-07 DIAGNOSIS — L30.9 DERMATITIS: ICD-10-CM

## 2023-12-07 DIAGNOSIS — L30.1 DYSHIDROTIC ECZEMA: ICD-10-CM

## 2023-12-07 PROCEDURE — 99214 OFFICE O/P EST MOD 30 MIN: CPT | Mod: VID | Performed by: FAMILY MEDICINE

## 2023-12-07 NOTE — PROGRESS NOTES
Juanjose is a 48 year old who is being evaluated via a billable video visit.      How would you like to obtain your AVS? MyChart  If the video visit is dropped, the invitation should be resent by: Text to cell phone: [phone]  Will anyone else be joining your video visit? No          Type 2 diabetes mellitus with hyperglycemia, without long-term current use of insulin (H)  2 diabetes reviewed with A1c recently increasing from 8.2% up to 10.6% unfortunately.  Has met with diabetes educator yesterday.  Medication adjustments with initiation of metformin  mg daily x 1 week then increasing to 2 tablets daily until follow-up visit with her January 9, 2024.  I have encouraged patient to be seen in person through my office in the next 3 to 4 months to ensure ongoing improvement demonstrated as well as additional cardiovascular monitoring.    Hyperlipidemia, unspecified hyperlipidemia type  Patient currently not on statin with history of hyperlipidemia and will reassess at follow-up in 3 months through this office with dietary and exercise modification attempts ongoing.    Dermatitis  Dermatitis involving palms of hands consistent with dyshidrotic eczema.  Has tried betamethasone without benefits and states that this in fact made it worse and using O'Marlon moisturizing lotion OTC.    Dyshidrotic eczema  As above, patient describes dermatologist utilizing betamethasone which in fact made rash worse therefore discontinued.  Was scheduled to see dermatologist tomorrow but feels like he will cancel his appointment per his request since O'Marlon moisturizing lotion is helping.           Subjective   Juanjose is a 48 year old, presenting for the following health issues:  follow up diabetes        12/7/2023     4:43 PM   Additional Questions   Roomed by [facility]     Patient seen for virtual visit.  Patient has type 2 diabetes.  Prior A1c at 8.2%.  Has had hyperglycemia.  Hard to have Dexcom 6 stay attached but has been told  ways to cover this so that it does not come off.  A1c did increase from 8.2% to 10.6% December 7, 2023 and states that he has been resistant to starting metformin which had been prescribed in the past.  He is not on statin therapy.  Blood pressure elevation previously at 159/92.  Needs to establish care with this office with in person visit.  Comprehensive review of systems as above otherwise all negative.            Review of Systems   Constitutional, HEENT, cardiovascular, pulmonary, GI, , musculoskeletal, neuro, skin, endocrine and psych systems are negative, except as otherwise noted.      Objective           Vitals:  No vitals were obtained today due to virtual visit.    Physical Exam   GENERAL: Healthy, alert and no distress  EYES: Eyes grossly normal to inspection.  No discharge or erythema, or obvious scleral/conjunctival abnormalities.  RESP: No audible wheeze, cough, or visible cyanosis.  No visible retractions or increased work of breathing.    SKIN: Visible skin clear. No significant rash, abnormal pigmentation or lesions.  NEURO: Cranial nerves grossly intact.  Mentation and speech appropriate for age.  PSYCH: Mentation appears normal, affect normal/bright, judgement and insight intact, normal speech and appearance well-groomed.                Video-Visit Details    Type of service:  Video Visit     Originating Location (pt. Location): Home    Distant Location (provider location):  On-site  Platform used for Video Visit: Domingo

## 2023-12-23 ENCOUNTER — MYC MEDICAL ADVICE (OUTPATIENT)
Dept: EDUCATION SERVICES | Facility: CLINIC | Age: 48
End: 2023-12-23

## 2023-12-23 DIAGNOSIS — E11.9 TYPE 2 DIABETES MELLITUS WITHOUT COMPLICATION, WITHOUT LONG-TERM CURRENT USE OF INSULIN (H): ICD-10-CM

## 2023-12-27 ENCOUNTER — TELEPHONE (OUTPATIENT)
Dept: FAMILY MEDICINE | Facility: CLINIC | Age: 48
End: 2023-12-27

## 2023-12-27 RX ORDER — TIRZEPATIDE 2.5 MG/.5ML
2.5 INJECTION, SOLUTION SUBCUTANEOUS
Qty: 2 ML | Refills: 1 | Status: SHIPPED | OUTPATIENT
Start: 2023-12-27 | End: 2024-01-30

## 2023-12-27 RX ORDER — METFORMIN HCL 500 MG
1000 TABLET, EXTENDED RELEASE 24 HR ORAL 2 TIMES DAILY WITH MEALS
Qty: 120 TABLET | Refills: 2 | Status: SHIPPED | OUTPATIENT
Start: 2023-12-27 | End: 2024-03-20

## 2023-12-27 NOTE — TELEPHONE ENCOUNTER
Diabetes Education Follow-up    Subjective/Objective:    Silviano Collins sent in blood glucose log for review. Last date of communication was: 12/6/23.    Diabetes is being managed with Diabetes Medications   Diabetes Medication(s)       Biguanides       metFORMIN (GLUCOPHAGE XR) 500 MG 24 hr tablet Take 1 tablet (500 mg) by mouth 2 times daily (with meals)            BG/Food Log:             Assessment:    BG are consistantly elevated on 2 tablets of 500 mg metformin/day. Pt agreeable to adding a 2nd agent and increasing metformin to 2,000 mg/day    Plan/Response:  See Patient Instructions for co-developed, patient-stated behavior change goals.    Recommend increase Metformin to 2,000 mg/day slowly titrating up by 500 mg/day each week    Recommend adding a second agent - Mounjaro    Any diabetes medication dose changes were made via the CDE Protocol and Collaborative Practice Agreement with the patient's referring provider. A copy of this encounter was shared with the provider.

## 2023-12-27 NOTE — TELEPHONE ENCOUNTER
Prior Authorization Retail Medication Request    Medication/Dose: Mounjaro 2.5mg/0.5ml sopn  Diagnosis and ICD code (if different than what is on RX):    New/renewal/insurance change PA/secondary ins. PA:  Previously Tried and Failed:    Rationale:      Insurance   Primary: [employer]   Insurance ID:  [id number]     Secondary (if applicable):  Insurance ID:      Pharmacy Information (if different than what is on RX)  Name:    Phone:    Fax:    Dot Esparza  Certified Pharmacy Technician  [facility]  [phone]

## 2023-12-28 ENCOUNTER — TELEPHONE (OUTPATIENT)
Dept: FAMILY MEDICINE | Facility: CLINIC | Age: 48
End: 2023-12-28

## 2023-12-28 NOTE — TELEPHONE ENCOUNTER
Prior Authorization Retail Medication Request    Medication/Dose: Dexcom G6 Sensor  Diagnosis and ICD code (if different than what is on RX):    New/renewal/insurance change PA/secondary ins. PA:  Previously Tried and Failed:    Rationale:      Insurance   Primary: [employer]  Insurance ID:  [id number]    Secondary (if applicable):  Insurance ID:      Pharmacy Information (if different than what is on RX)  Name:  Phone:    Fax:    Dot Esparza  Certified Pharmacy Technician  [facility]  [phone]

## 2023-12-31 NOTE — TELEPHONE ENCOUNTER
Central Prior Authorization Team   Phone: [phone]    PA Initiation    Medication: MOUNJARO 2.5 MG/0.5ML SC SOPN  Insurance Company: [employer] - Phone [phone] Fax [phone]  Pharmacy Filling the Rx: [facility] [place], [place] - [address]  Filling Pharmacy Phone: [phone]  Filling Pharmacy Fax:    Start Date: 12/30/2023

## 2024-01-02 NOTE — TELEPHONE ENCOUNTER
Prior Authorization Approval    Authorization Effective Date: 1/2/2024  Authorization Expiration Date: 1/1/2025  Medication: Dexcom G6 Sensor  Approved Dose/Quantity:    Reference #:     Insurance Company: [employer] - Phone [phone] Fax [phone]  Expected CoPay:       CoPay Card Available:      Foundation Assistance Needed:    Which Pharmacy is filling the prescription (Not needed for infusion/clinic administered): [facility] [place], [place] - [address] [place] [address]  Pharmacy Notified:  Yes  Patient Notified:  **Instructed pharmacy to notify patient when script is ready to /ship.**

## 2024-01-02 NOTE — TELEPHONE ENCOUNTER
Central Prior Authorization Team   Phone: [phone]    PA Initiation    Medication: Dexcom G6 Sensor  Insurance Company: [employer] - Phone [phone] Fax [phone]  Pharmacy Filling the Rx: [facility] [place], [place] - [address]  Filling Pharmacy Phone: [phone]  Filling Pharmacy Fax:    Start Date: 1/2/2024

## 2024-01-03 NOTE — TELEPHONE ENCOUNTER
Prior Authorization Not Needed per Insurance    Medication: MOUNJARO 2.5 MG/0.5ML SC SOPN  Insurance Company: [employer] - Phone [phone] Fax [phone]  Expected CoPay: $    Pharmacy Filling the Rx: [facility] [place], [place] - [address]  Pharmacy Notified: YES  Patient Notified: YES (pharmacy will notify the patient when ready)

## 2024-01-09 ENCOUNTER — VIRTUAL VISIT (OUTPATIENT)
Dept: EDUCATION SERVICES | Facility: CLINIC | Age: 49
End: 2024-01-09
Payer: COMMERCIAL

## 2024-01-09 DIAGNOSIS — E11.9 TYPE 2 DIABETES MELLITUS WITHOUT COMPLICATION, WITHOUT LONG-TERM CURRENT USE OF INSULIN (H): Primary | ICD-10-CM

## 2024-01-09 PROCEDURE — G0108 DIAB MANAGE TRN  PER INDIV: HCPCS | Mod: 95 | Performed by: DIETITIAN, REGISTERED

## 2024-01-09 NOTE — PATIENT INSTRUCTIONS
Goals for Diabetes Care:    1. Eat 3 balanced meals each day - Monitor carb intake and aim for 45-60 grams per meal  This would be equal to about 4 choices of carbohydrates. Carbohydrate 1 choice = 15 grams  Aim to eat the plate method style - half your plate fruits/veggies, 1/4 the plate protein and 1/4 plate starch (rice, potato, pasta)    2. Check blood sugars at least 2-3 times each day at varying times   Blood Glucose Targets:   1. Fasting and before meal target is 80 - 130   2. 2 hours after a meal target is < 180  Always remember to bring meter and log book to all appointments.    3. Activity really helps improve blood sugars. Try to Incorporate 30 minutes activity into each day - does not need to be all at one time & walking counts!    4. Treating low BG. Rule of 15 = BG 50-70 mg/dL = 15 gram carb (4 oz juice, 4 glucose tabs, OR 4 oz pop), then recheck BG in 15 minutes. If still low repeat. (If BG <50 mg/dL = 8 oz pop/juice or 8 glucose tabs).    5. Take diabetes medications as prescribed   -Continue Mounjaro 2.5 mg/week  -Continue Metformin 2,000 mg/day    Follow up with your Diabetic Educator to assess BG targets and need for modifications to medications and/or lifestyle.    Call with any questions.  Thank you!  Lakshmi Blakely RDN, HO, [facility]   Certified Diabetes Care &   [facility]  Triage [phone]

## 2024-01-09 NOTE — LETTER
1/9/2024         RE: Silviano Collins  [address] [facility] [address]        Dear Colleague,    Thank you for referring your patient, Silviano Collins, to the [facility]. Please see a copy of my visit note below.    Diabetes Self-Management Education & Support    Presents for: Individual review    Type of service:  Video Visit    If the video visit is dropped, the video visit invitation should be resent by: Text to cell phone: [phone]    Originating Location (pt. Location): Home  Distant Location (provider location): Offsite  Mode of Communication:  Video Conference via ViaSat Start Time:  08:00 AM  Video End Time (time video stopped): 8:30 AM    How would patient like to obtain AVS? Jcarlos      REPORTS:            Pt verbalized understanding of concepts discussed and recommendations provided today.       Continue education with the following diabetes management concepts: Healthy Eating, Being Active, Monitoring, Taking Medication, Problem Solving, Reducing Risks, and Healthy Coping    ASSESSMENT:  Patient has taken one week of Mounjaro and is noticing that his glucose numbers are improving and he is feeling less fatigued the last few days. Reviewed medication, use and importance of continuing to take. Patient is hoping to loose weight - he reports being down about 10 lbs. He is in transition of jobs, so not a lot of time to incorporate a lot of activity. However, he is considering doing the [employer] more or getting back to the gym.   Reviewed benefit of 7% weight loss for insulin resistance and benefits of activity. Reviewed, BG goals, clarity lyle and importance of keeping his dexcom lyle up and running to get full data.   Glucose Patterns & Trends:  Time in range of  mg/dL, 5% of the time.    PLAN  Continue Mounjaro at 2.5 mg/week  Continue Metformin 2,000 mg/day  Continue checking glucose 2-3 times daily with Dexcom   Schedule eye and dental  "exams  Topics to cover at upcoming visits: Healthy Eating, Being Active, Monitoring, Taking Medication, Problem Solving, Reducing Risks, and Healthy Coping    Follow-up: 4 weeks    See Care Plan for co-developed, patient-state behavior change goals.  AVS provided for patient today.    Education Materials Provided:  No new materials provided today      SUBJECTIVE/OBJECTIVE:  Presents for: Individual review  Accompanied by: Self  Diabetes education in the past 24mo: No  Focus of Visit: Monitoring, Taking Medication  Diabetes type: Type 2  Date of diagnosis: 3 years ago  Disease course: Improving  How confident are you filling out medical forms by yourself:: Extremely  Diabetes management related comments/concerns: medication and monitoring  Transportation concerns: No  Difficulty affording diabetes medication?: No  Difficulty affording diabetes testing supplies?: No  Other concerns:: None  Cultural Influences/Ethnic Background:  Choose not to answer      Diabetes Symptoms & Complications:  Fatigue: Sometimes  Neuropathy: No  Polydipsia: Yes  Polyphagia: No  Polyuria: Yes  Visual change: Yes  Slow healing wounds: Yes  Symptom course: Improving  Weight trend: Decreasing  Complications assessed today?: Yes  Autonomic neuropathy: No  CVA: No  Heart disease: No  Nephropathy: No  Peripheral neuropathy: No  Peripheral Vascular Disease: No  Retinopathy: No  Sexual dysfunction: Yes    Patient Problem List and Family Medical History reviewed for relevant medical history, current medical status, and diabetes risk factors.    Vitals:  There were no vitals taken for this visit.  Estimated body mass index is 31.1 kg/m  as calculated from the following:    Height as of 6/26/23: 1.803 m (5' 11\").    Weight as of 6/26/23: 101.2 kg (223 lb).   Last 3 BP:   BP Readings from Last 3 Encounters:   06/26/23 (!) 159/92   05/11/23 128/82   03/14/23 (!) 145/89       History   Smoking Status     Former   Smokeless Tobacco     Never " "      Labs:  Lab Results   Component Value Date    A1C 10.6 12/06/2023     Lab Results   Component Value Date     08/16/2023     06/21/2022     Lab Results   Component Value Date     06/29/2022     06/21/2022     Direct Measure HDL   Date Value Ref Range Status   06/29/2022 69 >=40 mg/dL Final   ]  GFR Estimate   Date Value Ref Range Status   08/16/2023 >90 >60 mL/min/1.73m2 Final     No results found for: \"GFRESTBLACK\"  Lab Results   Component Value Date    CR 0.81 08/16/2023     No results found for: \"MICROALBUMIN\"    Healthy Eating:  Healthy Eating Assessed Today: Yes  Meal planning/habits: None  Meals include: Breakfast, Lunch, Dinner  Beverages: Water  Has patient met with a dietitian in the past?: Yes    Being Active:  Being Active Assessed Today: Yes  Exercise:: Currently not exercising    Monitoring:  Monitoring Assessed Today: Yes  Blood Glucose Meter: CGM      Taking Medications:  Diabetes Medication(s)       Biguanides       metFORMIN (GLUCOPHAGE XR) 500 MG 24 hr tablet Take 2 tablets (1,000 mg) by mouth 2 times daily (with meals)       Incretin Mimetic Agents       tirzepatide (MOUNJARO) 2.5 MG/0.5ML pen Inject 2.5 mg Subcutaneous every 7 days            Taking Medication Assessed Today: Yes  Current Treatments: Diet    Problem Solving:                 Reducing Risks:  Reducing Risks Assessed Today: Yes  Diabetes Risks: Age over 45 years  Has dilated eye exam at least once a year?: No    Healthy Coping:  Healthy Coping Assessed Today: Yes  Emotional response to diabetes: Ready to learn  Stage of change: ACTION (Actively working towards change)  Patient Activation Measure Survey Score:       No data to display                  Care Plan and Education Provided:  Care Plan: Diabetes   Updates made by Lakshmi Blakely RD since 1/9/2024 12:00 AM        Problem: Diabetes Self-Management Education Needed to Optimize Self-Care Behaviors         Goal: Healthy Eating - follow a " healthy eating pattern for diabetes         Task: Provide education on portion control and consistency in amount, composition and timing of food intake Completed 1/9/2024   Responsible User: Lakshmi Blakely RD        Goal: Monitoring - monitor glucose and ketones as directed    This Visit's Progress: 80%   Recent Progress: 0%   Note:    Check BG 2-3 times daily with CGM       Goal: Reducing Risks - know how to prevent and treat long-term diabetes complications         Task: Provide education on major complications of diabetes, prevention, early diagnostic measures and treatment of complications Completed 1/9/2024   Responsible User: Lakshmi Blakely RD        Task: Provide education on recommended care for dental, eye and foot health Completed 1/9/2024   Responsible User: Lakshmi Blakely RD        Goal: Healthy Coping - use available resources to cope with the challenges of managing diabetes         Task: Provide education on the benefits of making appropriate lifestyle changes Completed 1/9/2024   Responsible User: Lakshmi Blakely RD            Time Spent: 30 minutes  Encounter Type: Individual    Any diabetes medication dose changes were made via the CDE Protocol per the patient's referring provider. A copy of this encounter was shared with the provider.

## 2024-01-09 NOTE — PROGRESS NOTES
Diabetes Self-Management Education & Support    Presents for: Individual review    Type of service:  Video Visit    If the video visit is dropped, the video visit invitation should be resent by: Text to cell phone: [phone]    Originating Location (pt. Location): Home  Distant Location (provider location): Offsite  Mode of Communication:  Video Conference via Solaiemes    Video Start Time:  08:00 AM  Video End Time (time video stopped): 8:30 AM    How would patient like to obtain AVS? Jcarlos      REPORTS:            Pt verbalized understanding of concepts discussed and recommendations provided today.       Continue education with the following diabetes management concepts: Healthy Eating, Being Active, Monitoring, Taking Medication, Problem Solving, Reducing Risks, and Healthy Coping    ASSESSMENT:  Patient has taken one week of Mounjaro and is noticing that his glucose numbers are improving and he is feeling less fatigued the last few days. Reviewed medication, use and importance of continuing to take. Patient is hoping to loose weight - he reports being down about 10 lbs. He is in transition of jobs, so not a lot of time to incorporate a lot of activity. However, he is considering doing the [employer] more or getting back to the gym.   Reviewed benefit of 7% weight loss for insulin resistance and benefits of activity. Reviewed, BG goals, clarity lyle and importance of keeping his dexcom lyle up and running to get full data.   Glucose Patterns & Trends:  Time in range of  mg/dL, 5% of the time.    PLAN  Continue Mounjaro at 2.5 mg/week  Continue Metformin 2,000 mg/day  Continue checking glucose 2-3 times daily with Dexcom   Schedule eye and dental exams  Topics to cover at upcoming visits: Healthy Eating, Being Active, Monitoring, Taking Medication, Problem Solving, Reducing Risks, and Healthy Coping    Follow-up: 4 weeks    See Care Plan for co-developed, patient-state behavior change goals.  AVS provided  "for patient today.    Education Materials Provided:  No new materials provided today      SUBJECTIVE/OBJECTIVE:  Presents for: Individual review  Accompanied by: Self  Diabetes education in the past 24mo: No  Focus of Visit: Monitoring, Taking Medication  Diabetes type: Type 2  Date of diagnosis: 3 years ago  Disease course: Improving  How confident are you filling out medical forms by yourself:: Extremely  Diabetes management related comments/concerns: medication and monitoring  Transportation concerns: No  Difficulty affording diabetes medication?: No  Difficulty affording diabetes testing supplies?: No  Other concerns:: None  Cultural Influences/Ethnic Background:  Choose not to answer      Diabetes Symptoms & Complications:  Fatigue: Sometimes  Neuropathy: No  Polydipsia: Yes  Polyphagia: No  Polyuria: Yes  Visual change: Yes  Slow healing wounds: Yes  Symptom course: Improving  Weight trend: Decreasing  Complications assessed today?: Yes  Autonomic neuropathy: No  CVA: No  Heart disease: No  Nephropathy: No  Peripheral neuropathy: No  Peripheral Vascular Disease: No  Retinopathy: No  Sexual dysfunction: Yes    Patient Problem List and Family Medical History reviewed for relevant medical history, current medical status, and diabetes risk factors.    Vitals:  There were no vitals taken for this visit.  Estimated body mass index is 31.1 kg/m  as calculated from the following:    Height as of 6/26/23: 1.803 m (5' 11\").    Weight as of 6/26/23: 101.2 kg (223 lb).   Last 3 BP:   BP Readings from Last 3 Encounters:   06/26/23 (!) 159/92   05/11/23 128/82   03/14/23 (!) 145/89       History   Smoking Status    Former   Smokeless Tobacco    Never       Labs:  Lab Results   Component Value Date    A1C 10.6 12/06/2023     Lab Results   Component Value Date     08/16/2023     06/21/2022     Lab Results   Component Value Date     06/29/2022     06/21/2022     Direct Measure HDL   Date Value Ref " "Range Status   06/29/2022 69 >=40 mg/dL Final   ]  GFR Estimate   Date Value Ref Range Status   08/16/2023 >90 >60 mL/min/1.73m2 Final     No results found for: \"GFRESTBLACK\"  Lab Results   Component Value Date    CR 0.81 08/16/2023     No results found for: \"MICROALBUMIN\"    Healthy Eating:  Healthy Eating Assessed Today: Yes  Meal planning/habits: None  Meals include: Breakfast, Lunch, Dinner  Beverages: Water  Has patient met with a dietitian in the past?: Yes    Being Active:  Being Active Assessed Today: Yes  Exercise:: Currently not exercising    Monitoring:  Monitoring Assessed Today: Yes  Blood Glucose Meter: CGM      Taking Medications:  Diabetes Medication(s)       Biguanides       metFORMIN (GLUCOPHAGE XR) 500 MG 24 hr tablet Take 2 tablets (1,000 mg) by mouth 2 times daily (with meals)       Incretin Mimetic Agents       tirzepatide (MOUNJARO) 2.5 MG/0.5ML pen Inject 2.5 mg Subcutaneous every 7 days            Taking Medication Assessed Today: Yes  Current Treatments: Diet    Problem Solving:                 Reducing Risks:  Reducing Risks Assessed Today: Yes  Diabetes Risks: Age over 45 years  Has dilated eye exam at least once a year?: No    Healthy Coping:  Healthy Coping Assessed Today: Yes  Emotional response to diabetes: Ready to learn  Stage of change: ACTION (Actively working towards change)  Patient Activation Measure Survey Score:       No data to display                  Care Plan and Education Provided:  Care Plan: Diabetes   Updates made by Lakshmi Blakely RD since 1/9/2024 12:00 AM        Problem: Diabetes Self-Management Education Needed to Optimize Self-Care Behaviors         Goal: Healthy Eating - follow a healthy eating pattern for diabetes         Task: Provide education on portion control and consistency in amount, composition and timing of food intake Completed 1/9/2024   Responsible User: Lakshmi Blakely RD        Goal: Monitoring - monitor glucose and ketones as directed  "   This Visit's Progress: 80%   Recent Progress: 0%   Note:    Check BG 2-3 times daily with CGM       Goal: Reducing Risks - know how to prevent and treat long-term diabetes complications         Task: Provide education on major complications of diabetes, prevention, early diagnostic measures and treatment of complications Completed 1/9/2024   Responsible User: Lakshmi Blakely RD        Task: Provide education on recommended care for dental, eye and foot health Completed 1/9/2024   Responsible User: Lakshmi Blakely RD        Goal: Healthy Coping - use available resources to cope with the challenges of managing diabetes         Task: Provide education on the benefits of making appropriate lifestyle changes Completed 1/9/2024   Responsible User: Lakshmi Blakely RD            Time Spent: 30 minutes  Encounter Type: Individual    Any diabetes medication dose changes were made via the CDE Protocol per the patient's referring provider. A copy of this encounter was shared with the provider.

## 2024-01-11 ENCOUNTER — TRANSFERRED RECORDS (OUTPATIENT)
Dept: HEALTH INFORMATION MANAGEMENT | Facility: CLINIC | Age: 49
End: 2024-01-11
Payer: COMMERCIAL

## 2024-01-11 LAB — RETINOPATHY: NEGATIVE

## 2024-01-15 ENCOUNTER — TELEPHONE (OUTPATIENT)
Dept: FAMILY MEDICINE | Facility: CLINIC | Age: 49
End: 2024-01-15
Payer: COMMERCIAL

## 2024-01-15 NOTE — TELEPHONE ENCOUNTER
Patient called into the clinic to report that he had accidentally just taken three Modafinil tablets (200 mg each tablet with 600 mg total) around 5 minutes ago (around 8 am), instead of 3 tablets of his 500 mg Metformin (for 1500 mg total).    Writer huddled with Dr. Mccormick right away, who stated that the patient should call Poison Control now to find out if the modafinil dose is to high for the patient at [phone].    Writer called the patient back and relayed the poison control phone number to the patient.    Writer recommended that the patient to call back for any concerns or questions.    Patient verbalized understanding and agrees with the plan.    Denies other questions or concerns at this time.    Michelle Deleon, RN, BSN  [facility]

## 2024-01-24 ENCOUNTER — VIRTUAL VISIT (OUTPATIENT)
Dept: FAMILY MEDICINE | Facility: CLINIC | Age: 49
End: 2024-01-24
Payer: COMMERCIAL

## 2024-01-24 ENCOUNTER — TELEPHONE (OUTPATIENT)
Dept: FAMILY MEDICINE | Facility: CLINIC | Age: 49
End: 2024-01-24

## 2024-01-24 DIAGNOSIS — H10.13 ALLERGIC CONJUNCTIVITIS, BILATERAL: Primary | ICD-10-CM

## 2024-01-24 DIAGNOSIS — B00.1 COLD SORE: ICD-10-CM

## 2024-01-24 PROCEDURE — 99213 OFFICE O/P EST LOW 20 MIN: CPT | Mod: 95 | Performed by: FAMILY MEDICINE

## 2024-01-24 RX ORDER — KETOTIFEN FUMARATE 0.35 MG/ML
1 SOLUTION/ DROPS OPHTHALMIC EVERY 8 HOURS
Qty: 5 ML | Refills: 0 | Status: SHIPPED | OUTPATIENT
Start: 2024-01-24 | End: 2024-06-28

## 2024-01-24 RX ORDER — VALACYCLOVIR HYDROCHLORIDE 500 MG/1
500 TABLET, FILM COATED ORAL 2 TIMES DAILY
Qty: 30 TABLET | Refills: 0 | Status: SHIPPED | OUTPATIENT
Start: 2024-01-24 | End: 2024-08-22

## 2024-01-24 ASSESSMENT — ASTHMA QUESTIONNAIRES
QUESTION_3 LAST FOUR WEEKS HOW OFTEN DID YOUR ASTHMA SYMPTOMS (WHEEZING, COUGHING, SHORTNESS OF BREATH, CHEST TIGHTNESS OR PAIN) WAKE YOU UP AT NIGHT OR EARLIER THAN USUAL IN THE MORNING: NOT AT ALL
QUESTION_1 LAST FOUR WEEKS HOW MUCH OF THE TIME DID YOUR ASTHMA KEEP YOU FROM GETTING AS MUCH DONE AT WORK, SCHOOL OR AT HOME: NONE OF THE TIME
ACT_TOTALSCORE: 24
QUESTION_5 LAST FOUR WEEKS HOW WOULD YOU RATE YOUR ASTHMA CONTROL: WELL CONTROLLED
ACT_TOTALSCORE: 24
QUESTION_4 LAST FOUR WEEKS HOW OFTEN HAVE YOU USED YOUR RESCUE INHALER OR NEBULIZER MEDICATION (SUCH AS ALBUTEROL): NOT AT ALL
QUESTION_2 LAST FOUR WEEKS HOW OFTEN HAVE YOU HAD SHORTNESS OF BREATH: NOT AT ALL

## 2024-01-24 NOTE — PROGRESS NOTES
"Juanjose is a 48 year old who is being evaluated via a billable video visit.      How would you like to obtain your AVS? MyChart  If the video visit is dropped, the invitation should be resent by: Text to cell phone: [phone]  Will anyone else be joining your video visit? No          Assessment & Plan     Allergic conjunctivitis, bilateral  Taking benadryl  No tongue swelling, swallowing issues, trouble breathing   Start ketotifen   If worsening will need to be seen   - ketotifen fumarate 0.035% 0.035 % SOLN ophthalmic solution; Place 1 drop into both eyes every 8 hours    BMI  Estimated body mass index is 31.1 kg/m  as calculated from the following:    Height as of 6/26/23: 1.803 m (5' 11\").    Weight as of 6/26/23: 101.2 kg (223 lb).       Subjective   Juanjose is a 48 year old, presenting for the following health issues:  Nasal Congestion    HPI     Has history of sinus issues and swollen eyes   Today, his right eye is very swollen   Ate some berries this morning and a few minutes later started getting symptoms   Hard to read anything right now because his eyes are puffy   Nose really plugged up   Takes his normal allergy medication montelukast and xyzal   Took an extra zyrtec   Took 2 benadryl   No trouble swallowing  No issues breathing   No diarrhea, nausea, or vomiting   No tongue swelling     New symptoms today are runny nose and eye puffiness and trouble seeing do to puffiness         Objective           Vitals:  No vitals were obtained today due to virtual visit.    Physical Exam   GENERAL: alert and no distress  EYES: Eyes grossly normal to inspection.  No discharge or erythema, or obvious scleral/conjunctival abnormalities.  RESP: No audible wheeze, cough, or visible cyanosis.    SKIN: Visible skin clear. No significant rash, abnormal pigmentation or lesions.  NEURO: Cranial nerves grossly intact.  Mentation and speech appropriate for age.  PSYCH: Appropriate affect, tone, and pace of words        Video-Visit " Details    Type of service:  Video Visit     Originating Location (pt. Location): Home    Distant Location (provider location):  On-site  Platform used for Video Visit: Domingo  Signed Electronically by: Ganesh New DO    [id number]

## 2024-01-24 NOTE — COMMUNITY RESOURCES LIST (ENGLISH)
01/24/2024   [facility] - Outpatient Clinics  N/A  For additional resource needs, please contact your health insurance member services or your primary care team.  Phone: [phone]   Email: N/A   Address: [facility] [place] [address]   Hours: N/A        Food and Nutrition       Food pantry  1  [employer] - [place] Distance: 2.14 miles      Pickup   [address] [place] [address]  Language: English  Hours: Mon - Tue 9:00 AM - 5:00 PM , Thu 9:00 AM - 5:00 PM , Sat 9:00 AM - 12:00 PM  Fees: Free   Phone: (671) 156-1556 Ext.112 Email: aram@[employer].org Website: http://www.[employer].org     2  [facility] - Dry Food Pantry Distance: 2.3 miles      Pickup   [address] [place] [address]  Language: English  Hours: Mon - Fri 9:00 AM - 3:00 PM  Fees: Free   Phone: (485) 935-1352 Email: office@[facility].[employer] [place] Website: http://www.[facility].[place]uy [place]/     SNAP application assistance  3  [employer] [place] Distance: 24.26 miles      Phone/Virtual   [address] [address]  Language: English, Hmong, Equatorial Guinean, Indonesian, Finnish  Hours: Mon - Fri 8:30 AM - 4:30 PM  Fees: Free   Phone: (930) 934-9728 Email: helpline@hungers[employer].org Website: https://www.hungersolutions.org/programs/[place]-food-helpline/     4  [facility] Distance: 4.06 miles      In-Person, Phone/Virtual   [address] [place] [address]  Language: English  Hours: Mon - Fri 8:00 AM - 4:30 PM  Fees: Free   Phone: (939) 405-6348 Email: lise@[facility].mn. Website: https://www.[facility].us/Facilities/Facility/Details/23     Soup kitchen or free meals  5  [facility] - Homeless Outreach Services Team - Food Assistance Distance: 16.19 miles      Pickup   [address] [place] [address]  Language: English  Hours: Mon - Thu 9:30 AM - 3:30 PM  Fees:  Free   Phone: (457) 716-6421 Email: office@saint58.[employer]billion.com Website: https://www.saintandrews.org/community-resource-center/     6  [facility] - Community Resource [place] - Thursday Night Community Meal Distance: 16.3 miles      In-Person   [address] [place] [address]  Language: English, Slovenian  Hours: Thu 6:00 PM - 7:00 PM  Fees: Free   Phone: (422) 439-6724 Email: center@[employer].com Website: https://www.saintandrews.org/communityCellPlyresource-center/          Important Numbers & Websites       [address]org  Poison Control   (578) 774-3698 [place]  Suicide and Crisis Lifeline   [address] [place], [place] [address]line.org  Childhelp [place] Child Abuse Hotline   369.683.1389 Childhelphotline.org  [place] Sexual Assault Hotline   (954) 593-3258 (HOPE) [facility].org  National Runaway Safeline   (476) 125-8500 (RUNAWAY) [facility].org  Pregnancy & Postpartum Support [place]   Call/text 686-262-2369 Ppsupport[place].org  Substance Abuse National Helpline ([facility]   182-085-HELP (0880) Findtreatment.gov  Emergency Services   911

## 2024-01-24 NOTE — COMMUNITY RESOURCES LIST (ENGLISH)
01/24/2024   [facility] - Outpatient Clinics  N/A  For additional resource needs, please contact your health insurance member services or your primary care team.  Phone: [phone]   Email: N/A   Address: [facility] [place] [address]   Hours: N/A        Food and Nutrition       Food pantry  1  [employer] - [place] Distance: 2.14 miles      Pickup   [address] [place] [address]  Language: English  Hours: Mon - Tue 9:00 AM - 5:00 PM , Thu 9:00 AM - 5:00 PM , Sat 9:00 AM - 12:00 PM  Fees: Free   Phone: (834) 434-9301 Ext.112 Email: aram@[employer].org Website: http://www.[employer].org     2  [facility] - Dry Food Pantry Distance: 2.3 miles      Pickup   [address] [place] [address]  Language: English  Hours: Mon - Fri 9:00 AM - 3:00 PM  Fees: Free   Phone: (672) 709-6398 Email: office@[facility].[employer] Website: http://www.[facility].Wantster/     SNAP application assistance  3  [employer] [place] Distance: 24.26 miles      Phone/Virtual   [address] [address]  Language: English, Hmong, Monegasque, Armenian, Yemeni  Hours: Mon - Fri 8:30 AM - 4:30 PM  Fees: Free   Phone: (588) 294-2575 Email: helpline@[employer].org Website: https://www.hungersolutions.org/programs/[place]-food-helpline/     4  [facility] Distance: 4.06 miles      In-Person, Phone/Virtual   [address] [place] [address]  Language: English  Hours: Mon - Fri 8:00 AM - 4:30 PM  Fees: Free   Phone: (892) 455-1522 Email: lise@[facility].mn. Website: https://www.[facility].us/Facilities/Facility/Details/23     Soup kitchen or free meals  5  [facility] - Homeless Outreach Services Team - Food Assistance Distance: 16.19 miles      Pickup   [address] [place] [address]  Language: English  Hours: Mon - Thu 9:30 AM - 3:30 PM  Fees:  Free   Phone: (675) 924-8985 Email: office@[employer] Website: https://www.saintandrews.org/community-resource-center/     6  [facility] - Community Resource [place] - Thursday Night Community Meal Distance: 16.3 miles      In-Person   [address] [place] [address]  Language: English, Bengali  Hours: Thu 6:00 PM - 7:00 PM  Fees: Free   Phone: (499) 471-2676 Email: center@[employer]rews[employer] Website: https://www.saintandrews.org/communityCapsule Techresource-center/          Important Numbers & Websites       [address].org  Poison Control   (449) 115-2278 [place]  Suicide and Crisis Lifeline   [address] [place], [place] [address]line.org  Childhelp [place] Child Abuse Hotline   459.400.1449 Childhelphotline.org  [place] Sexual Assault Hotline   (999) 313-5213 (HOPE) [facility].org  National Runaway Safeline   (721) 294-4099 (RUNAWAY) [facility].org  Pregnancy & Postpartum Support [place]   Call/text 115-965-0714 Ppsupport[place].org  Substance Abuse National Helpline ([facility]   149-866-HELP (1945) Findtreatment.gov  Emergency Services   911

## 2024-01-24 NOTE — TELEPHONE ENCOUNTER
"S-(situation): Patient calling to schedule an appointment for eye symptoms.     B-(background): Using Monjaro weekly for past 4-5 weeks. Last injection this morning. \"Terrible Allergies\".    A-(assessment): Eyes are watery today, some swelling present per usual. They are red and itchy and has to blink frequently to see. He says they are hard to open.No yellow thick discharge. Nose is more congested than usual.     R-(recommendations): Possible viral infection. Patient transferred to central scheduling to look for an appointment near where he is. Advised urgent care otherwise. He is concerned maybe he is having a reaction to a medication. Denies breathing issues.  Norma IBARRA RN      "

## 2024-01-29 DIAGNOSIS — E11.9 TYPE 2 DIABETES MELLITUS WITHOUT COMPLICATION, WITHOUT LONG-TERM CURRENT USE OF INSULIN (H): ICD-10-CM

## 2024-01-30 ENCOUNTER — VIRTUAL VISIT (OUTPATIENT)
Dept: PSYCHOLOGY | Facility: CLINIC | Age: 49
End: 2024-01-30
Payer: COMMERCIAL

## 2024-01-30 DIAGNOSIS — F43.23 ADJUSTMENT DISORDER WITH MIXED ANXIETY AND DEPRESSED MOOD: Primary | ICD-10-CM

## 2024-01-30 PROCEDURE — 90834 PSYTX W PT 45 MINUTES: CPT | Mod: 95 | Performed by: MARRIAGE & FAMILY THERAPIST

## 2024-01-30 RX ORDER — TIRZEPATIDE 2.5 MG/.5ML
INJECTION, SOLUTION SUBCUTANEOUS
Qty: 2 ML | Refills: 1 | Status: SHIPPED | OUTPATIENT
Start: 2024-01-30 | End: 2024-04-24

## 2024-01-30 ASSESSMENT — ANXIETY QUESTIONNAIRES
7. FEELING AFRAID AS IF SOMETHING AWFUL MIGHT HAPPEN: NOT AT ALL
7. FEELING AFRAID AS IF SOMETHING AWFUL MIGHT HAPPEN: NOT AT ALL
4. TROUBLE RELAXING: NOT AT ALL
2. NOT BEING ABLE TO STOP OR CONTROL WORRYING: NOT AT ALL
1. FEELING NERVOUS, ANXIOUS, OR ON EDGE: NOT AT ALL
8. IF YOU CHECKED OFF ANY PROBLEMS, HOW DIFFICULT HAVE THESE MADE IT FOR YOU TO DO YOUR WORK, TAKE CARE OF THINGS AT HOME, OR GET ALONG WITH OTHER PEOPLE?: NOT DIFFICULT AT ALL
6. BECOMING EASILY ANNOYED OR IRRITABLE: NOT AT ALL
5. BEING SO RESTLESS THAT IT IS HARD TO SIT STILL: NOT AT ALL
GAD7 TOTAL SCORE: 0
3. WORRYING TOO MUCH ABOUT DIFFERENT THINGS: NOT AT ALL
IF YOU CHECKED OFF ANY PROBLEMS ON THIS QUESTIONNAIRE, HOW DIFFICULT HAVE THESE PROBLEMS MADE IT FOR YOU TO DO YOUR WORK, TAKE CARE OF THINGS AT HOME, OR GET ALONG WITH OTHER PEOPLE: NOT DIFFICULT AT ALL

## 2024-01-30 NOTE — PROGRESS NOTES
[facility]                                     Progress Note    Patient Name: Silviano Collins  Date: 1/30/24         Service Type: Individual      Session Start Time: 12:00  Session End Time: 12:39     Session Length: 39    Session #: 4    Attendees: Client attended alone    Service Modality:  Video Visit:      Provider verified identity through the following two step process.  Patient provided:  Patient is known previously to provider and Patient was verified at admission/transfer    Telemedicine Visit: The patient's condition can be safely assessed and treated via synchronous audio and visual telemedicine encounter.      Reason for Telemedicine Visit: Services only offered telehealth    Originating Site (Patient Location): Patient's other vehicle    Distant Site (Provider Location): Provider Remote Setting- Home Office    Consent:  The patient/guardian has verbally consented to: the potential risks and benefits of telemedicine (video visit) versus in person care; bill my insurance or make self-payment for services provided; and responsibility for payment of non-covered services.     Patient would like the video invitation sent by:  My Chart    Mode of Communication:  Video Conference via [facility]    Distant Location (Provider):  Off-site    As the provider I attest to compliance with applicable laws and regulations related to telemedicine.    DATA  Interactive Complexity: No  Crisis: No        Progress Since Last Session (Related to Symptoms / Goals / Homework):   Symptoms: Improving interpersonal functioning/relationship.    Homework: Partially completed      Episode of Care Goals: Satisfactory progress - ACTION (Actively working towards change); Intervened by reinforcing change plan / affirming steps taken     Current / Ongoing Stressors and Concerns:   Patient reported regarding ongoing though decreased conflict with his spouse.  Patient reported regarding differing wants for  socialization and a lack of intimacy.     Treatment Objective(s) Addressed in This Session:   learn & utilize at least some assertive communication skills weekly  Patient identified his desired continued therapy goal.     Intervention:   Interpersonal Therapy: reviewed with patient using assertive/direct/communication with his spouse to discuss differences and relationship wants.  Motivational Interviewing: reviewed with patient his desired continued therapy goal.    Assessments completed prior to visit:  The following assessments were completed by patient for this visit:  PHQ9:       8/6/2023     8:49 PM 9/19/2023     1:13 PM 9/25/2023    11:57 AM 11/10/2023     9:22 AM 11/21/2023     8:05 AM 12/12/2023     1:51 PM 1/30/2024     9:55 AM   PHQ-9 SCORE   PHQ-9 Total Score MyChart 1 (Minimal depression) 2 (Minimal depression) 0 0 0 0 0   PHQ-9 Total Score 1 2 0 0 0 0 0     GAD7:       9/25/2023    11:58 AM 11/10/2023     9:23 AM 12/12/2023     1:51 PM 1/30/2024     9:56 AM   SOLIS-7 SCORE   Total Score 0 (minimal anxiety) 0 (minimal anxiety) 0 (minimal anxiety) 0 (minimal anxiety)   Total Score 0 0 0 0         ASSESSMENT: Current Emotional / Mental Status (status of significant symptoms):   Risk status (Self / Other harm or suicidal ideation)   Patient denies current fears or concerns for personal safety.   Patient denies current or recent suicidal ideation or behaviors.   Patient denies current or recent homicidal ideation or behaviors.   Patient denies current or recent self injurious behavior or ideation.   Patient denies other safety concerns.   Patient reports there has been no change in risk factors since their last session.     Patient reports there has been no change in protective factors since their last session.     Recommended that patient call 911 or go to the local ED should there be a change in any of these risk factors.     Appearance:   Appropriate    Eye Contact:   Good    Psychomotor Behavior: Normal     Attitude:   Cooperative  Interested Friendly Pleasant Attentive   Orientation:   All   Speech    Rate / Production: Normal/ Responsive Talkative    Volume:  Normal    Mood:    Anxious  Normal   Affect:    Appropriate  Subdued  Worrisome    Thought Content:  Clear  Rumination    Thought Form:  Coherent  Goal Directed  Logical    Insight:    Good  and Intellectual Insight     Medication Review:   No changes to current psychiatric medication(s)     Medication Compliance:   Yes     Changes in Health Issues:   None reported     Chemical Use Review:   Substance Use: Chemical use reviewed, no active concerns identified      Tobacco Use: No current tobacco use.      Diagnosis:  1. Adjustment disorder with mixed anxiety and depressed mood          Collateral Reports Completed:   Not Applicable    PLAN: (Patient Tasks / Therapist Tasks / Other)  Patient agreed to work on using assertive/direct/communication with his spouse to discuss differences and relationship wants.        Nagi Euceda, LMFT 1/30/24                                                         ______________________________________________________________________    Family with client present Treatment Plan    Patient's Name: Silviano Collins  YOB: 1975    Date of Creation: 9/25/23  Date Treatment Plan Last Reviewed/Revised: 1/30/24, next due 4/30/24.    DSM5 Diagnoses: Adjustment Disorders  309.28 (F43.23) With mixed anxiety and depressed mood  Psychosocial / Contextual Factors: relationship conflict related to infidelity  PROMIS (reviewed every 90 days): 28 (1/27/24)    Referral / Collaboration:  Referral to another professional/service is not indicated at this time..    Anticipated number of session for this episode of care:  11-20  Anticipation frequency of session: Every other week  Anticipated Duration of each session:  38-53+  Treatment plan will be reviewed in 90 days or when goals have been changed.        MeasurableTreatment Goal(s) related to diagnosis / functional impairment(s)  Goal 1: Patient and spouse will improve interpersonal functioning/relationship by 2 points on a 1-10 Likert scale per self-report (10 = optimal interpersonal functioning).    I will know I've met my goal when my relationship have improved and stabilized, marriage intact.      Objective #A (Client Action)    Status: Continued - Date: 1/30/24     Patient will learn & utilize at least some assertive communication skills weekly.    Intervention(s)  Therapist will teach assertiveness skills. Nonviolent Communication and DBT Interpersonal Effectiveness skills..    Objective #B  Patient will practice setting boundaries some times in the next 12 weeks.    Status: Continued - Date: 1/30/24     Intervention(s)  Therapist will teach assertiveness skills. King-setting.    Objective #C  Patient and will spouse will engage in positive experiences with each other.  Status:  Continued - Date: 1/30/24    Intervention(s)  Therapist will teach strategies for connection/increasing intimacy.    Patient and significant other/spouse have reviewed and agreed to the above plan.      Nagi Euceda, LMFT  January 30, 2024

## 2024-02-06 ENCOUNTER — VIRTUAL VISIT (OUTPATIENT)
Dept: EDUCATION SERVICES | Facility: CLINIC | Age: 49
End: 2024-02-06
Payer: COMMERCIAL

## 2024-02-06 ENCOUNTER — MYC MEDICAL ADVICE (OUTPATIENT)
Dept: FAMILY MEDICINE | Facility: CLINIC | Age: 49
End: 2024-02-06

## 2024-02-06 DIAGNOSIS — E11.9 CONTROLLED TYPE 2 DIABETES MELLITUS WITHOUT COMPLICATION, WITHOUT LONG-TERM CURRENT USE OF INSULIN (H): Primary | ICD-10-CM

## 2024-02-06 PROCEDURE — 98968 PH1 ASSMT&MGMT NQHP 21-30: CPT | Mod: 95 | Performed by: DIETITIAN, REGISTERED

## 2024-02-06 RX ORDER — BLOOD-GLUCOSE SENSOR
1 EACH MISCELLANEOUS
Qty: 2 EACH | Refills: 4 | Status: SHIPPED | OUTPATIENT
Start: 2024-02-06 | End: 2024-05-14

## 2024-02-06 NOTE — LETTER
2/6/2024         RE: Silviano Collins  [address] [address]        Dear Colleague,    Thank you for referring your patient, Silviano Collins, to the [facility]. Please see a copy of my visit note below.    Diabetes Self-Management Education & Support    Presents for: Individual review    Type of Service: Telephone Visit    Originating Location (Patient Location): Home  Distant Location (Provider Location): Offsite  Mode of Communication:  Telephone    Telephone Visit Start Time:  8:15 AM  Telephone Visit End Time (telephone visit stop time): 8:40 AM    How would patient like to obtain AVS? Jcarlos      REPORTS:          Pt verbalized understanding of concepts discussed and recommendations provided today.       Continue education with the following diabetes management concepts: Healthy Eating, Being Active, Monitoring, Taking Medication, Problem Solving, Reducing Risks, and Healthy Coping    ASSESSMENT:  Met with Juanjose who states he is tolerating 3 tabs Metformin and taking 2.5 mg Mounjaro a week. Patient reports feeling like he has gotten more hungry lately and been wanting to snack. When he first started the Mounjaro these were improved. He has lost 10 lbs, but has not been able to loose more - he is hoping to get more activity in soon as well.   Discussed checking labs and increasing Mounjaro.   Patient dislikes the Dexcom G6 and wants to try the Viri 3 - discussed options.  Glucose Patterns & Trends:  Time in range of  mg/dL, 72% of the time.    PLAN  Check BMP an if labs stable then increase Mounjaro to 5 mg/week  Continue Metformin 1500 mg/day   Check glucose 3 times daily  Topics to cover at upcoming visits: Healthy Eating, Being Active, Monitoring, and Taking Medication    Follow-up: 4-6 weeks (pt did not schedule yet)    See Care Plan for co-developed, patient-state behavior change goals.  AVS provided for patient today.    Education Materials  "Provided:  No new materials provided today      SUBJECTIVE/OBJECTIVE:  Presents for: Individual review  Accompanied by: Self  Diabetes education in the past 24mo: No  Focus of Visit: Monitoring, Taking Medication, CGM  Type of CGM visit: Personal CGM Follow-up  Diabetes type: Type 2  Date of diagnosis: 3 years ago  Disease course: Improving  How confident are you filling out medical forms by yourself:: Extremely  Diabetes management related comments/concerns: medication and monitoring  Transportation concerns: No  Difficulty affording diabetes medication?: No  Difficulty affording diabetes testing supplies?: No  Other concerns:: None  Cultural Influences/Ethnic Background:  Choose not to answer    Diabetes Symptoms & Complications:  Weight trend: Decreasing  Symptom course: Improving  Disease course: Improving  Complications assessed today?: Yes  Autonomic neuropathy: No  CVA: No  Heart disease: No  Nephropathy: No  Peripheral neuropathy: No  Peripheral Vascular Disease: No  Retinopathy: No  Sexual dysfunction: Yes    Patient Problem List and Family Medical History reviewed for relevant medical history, current medical status, and diabetes risk factors.    Vitals:  There were no vitals taken for this visit.  Estimated body mass index is 31.1 kg/m  as calculated from the following:    Height as of 6/26/23: 1.803 m (5' 11\").    Weight as of 6/26/23: 101.2 kg (223 lb).   Last 3 BP:   BP Readings from Last 3 Encounters:   06/26/23 (!) 159/92   05/11/23 128/82   03/14/23 (!) 145/89       History   Smoking Status     Former   Smokeless Tobacco     Never       Labs:  Lab Results   Component Value Date    A1C 10.6 12/06/2023     Lab Results   Component Value Date     08/16/2023     06/21/2022     Lab Results   Component Value Date     06/29/2022     06/21/2022     Direct Measure HDL   Date Value Ref Range Status   06/29/2022 69 >=40 mg/dL Final   ]  GFR Estimate   Date Value Ref Range Status " "  08/16/2023 >90 >60 mL/min/1.73m2 Final     No results found for: \"GFRESTBLACK\"  Lab Results   Component Value Date    CR 0.81 08/16/2023     No results found for: \"MICROALBUMIN\"    Healthy Eating:  Healthy Eating Assessed Today: Yes  Meal planning/habits: None  Meals include: Breakfast, Lunch, Dinner  Other: Patient reports eating smaller amounts  Beverages: Water  Has patient met with a dietitian in the past?: Yes    Being Active:  Being Active Assessed Today: Yes  Exercise:: Currently not exercising    Monitoring:  Monitoring Assessed Today: Yes  Blood Glucose Meter: CGM  Times checking blood sugar at home (number): 4  Times checking blood sugar at home (per): Day  Blood glucose trend: Decreasing      Taking Medications:  Diabetes Medication(s)       Biguanides       metFORMIN (GLUCOPHAGE XR) 500 MG 24 hr tablet Take 2 tablets (1,000 mg) by mouth 2 times daily (with meals)     Patient taking differently: Take 1,000 mg by mouth 2 times daily (with meals) 3 tablets - taking 1500 mg/day       Incretin Mimetic Agents       MOUNJARO 2.5 MG/0.5ML pen INJECT 2.5 MG UNDER THE SKIN EVERY 7 DAYS            Taking Medication Assessed Today: Yes  Current Treatments: Diet  Problems taking diabetes medications regularly?: No  Diabetes medication side effects?: No    Problem Solving:                 Reducing Risks:  Reducing Risks Assessed Today: Yes  Diabetes Risks: Age over 45 years, Sedentary Lifestyle  CAD Risks: Diabetes Mellitus, Male sex, Obesity, Sedentary lifestyle  Has dilated eye exam at least once a year?: Yes    Healthy Coping:  Healthy Coping Assessed Today: Yes  Emotional response to diabetes: Ready to learn  Stage of change: ACTION (Actively working towards change)  Patient Activation Measure Survey Score:       No data to display                  Care Plan and Education Provided:  Care Plan: Diabetes   Updates made by Lakshmi Blakely RD since 2/6/2024 12:00 AM        Problem: Diabetes Self-Management " Education Needed to Optimize Self-Care Behaviors         Goal: Monitoring - monitor glucose and ketones as directed    This Visit's Progress: 100%   Recent Progress: 80%   Note:    Check BG 2-3 times daily with CGM           Time Spent: 25 minutes  Encounter Type: Individual    Any diabetes medication dose changes were made via the CDE Protocol per the patient's referring provider. A copy of this encounter was shared with the provider.

## 2024-02-06 NOTE — PATIENT INSTRUCTIONS
Goals for Diabetes Care:    1. Eat 3 balanced meals each day - Monitor carb intake and aim for 45-60 grams per meal  This would be equal to about 4 choices of carbohydrates. Carbohydrate 1 choice = 15 grams  Aim to eat the plate method style - half your plate fruits/veggies, 1/4 the plate protein and 1/4 plate starch (rice, potato, pasta)    2. Check blood sugars at least 3-3 times each day at varying times   Blood Glucose Targets:   1. Fasting and before meal target is 80 - 130   2. 2 hours after a meal target is < 180  Always remember to bring meter and log book to all appointments.    3. Activity really helps improve blood sugars. Try to Incorporate 30 minutes activity into each day - does not need to be all at one time & walking counts!    4. Treating low BG. Rule of 15 = BG 50-70 mg/dL = 15 gram carb (4 oz juice, 4 glucose tabs, OR 4 oz pop), then recheck BG in 15 minutes. If still low repeat. (If BG <50 mg/dL = 8 oz pop/juice or 8 glucose tabs).    5. Take diabetes medications as prescribed   -Continue Metformin 1500 mg/day  -Continue Mounjaro 2.5 mg/week until labs checked then we will consider increasing to 5 mg/week    Follow up with your Diabetic Educator to assess BG targets and need for modifications to medications and/or lifestyle.    Call with any questions.  Thank you!  Lakshmi Blakely RDN, LD, [facility]   Certified Diabetes Care &   [facility]  Triage [phone]

## 2024-02-06 NOTE — PROGRESS NOTES
Diabetes Self-Management Education & Support    Presents for: Individual review    Type of Service: Telephone Visit    Originating Location (Patient Location): Home  Distant Location (Provider Location): Offsite  Mode of Communication:  Telephone    Telephone Visit Start Time:  8:15 AM  Telephone Visit End Time (telephone visit stop time): 8:40 AM    How would patient like to obtain AVS? Jcarlos      REPORTS:          Pt verbalized understanding of concepts discussed and recommendations provided today.       Continue education with the following diabetes management concepts: Healthy Eating, Being Active, Monitoring, Taking Medication, Problem Solving, Reducing Risks, and Healthy Coping    ASSESSMENT:  Met with Juanjose who states he is tolerating 3 tabs Metformin and taking 2.5 mg Mounjaro a week. Patient reports feeling like he has gotten more hungry lately and been wanting to snack. When he first started the Mounjaro these were improved. He has lost 10 lbs, but has not been able to loose more - he is hoping to get more activity in soon as well.   Discussed checking labs and increasing Mounjaro.   Patient dislikes the Dexcom G6 and wants to try the Viri 3 - discussed options.  Glucose Patterns & Trends:  Time in range of  mg/dL, 72% of the time.    PLAN  Check BMP an if labs stable then increase Mounjaro to 5 mg/week  Continue Metformin 1500 mg/day   Check glucose 3 times daily  Topics to cover at upcoming visits: Healthy Eating, Being Active, Monitoring, and Taking Medication    Follow-up: 4-6 weeks (pt did not schedule yet)    See Care Plan for co-developed, patient-state behavior change goals.  AVS provided for patient today.    Education Materials Provided:  No new materials provided today      SUBJECTIVE/OBJECTIVE:  Presents for: Individual review  Accompanied by: Self  Diabetes education in the past 24mo: No  Focus of Visit: Monitoring, Taking Medication, CGM  Type of CGM visit: Personal CGM  "Follow-up  Diabetes type: Type 2  Date of diagnosis: 3 years ago  Disease course: Improving  How confident are you filling out medical forms by yourself:: Extremely  Diabetes management related comments/concerns: medication and monitoring  Transportation concerns: No  Difficulty affording diabetes medication?: No  Difficulty affording diabetes testing supplies?: No  Other concerns:: None  Cultural Influences/Ethnic Background:  Choose not to answer    Diabetes Symptoms & Complications:  Weight trend: Decreasing  Symptom course: Improving  Disease course: Improving  Complications assessed today?: Yes  Autonomic neuropathy: No  CVA: No  Heart disease: No  Nephropathy: No  Peripheral neuropathy: No  Peripheral Vascular Disease: No  Retinopathy: No  Sexual dysfunction: Yes    Patient Problem List and Family Medical History reviewed for relevant medical history, current medical status, and diabetes risk factors.    Vitals:  There were no vitals taken for this visit.  Estimated body mass index is 31.1 kg/m  as calculated from the following:    Height as of 6/26/23: 1.803 m (5' 11\").    Weight as of 6/26/23: 101.2 kg (223 lb).   Last 3 BP:   BP Readings from Last 3 Encounters:   06/26/23 (!) 159/92   05/11/23 128/82   03/14/23 (!) 145/89       History   Smoking Status    Former   Smokeless Tobacco    Never       Labs:  Lab Results   Component Value Date    A1C 10.6 12/06/2023     Lab Results   Component Value Date     08/16/2023     06/21/2022     Lab Results   Component Value Date     06/29/2022     06/21/2022     Direct Measure HDL   Date Value Ref Range Status   06/29/2022 69 >=40 mg/dL Final   ]  GFR Estimate   Date Value Ref Range Status   08/16/2023 >90 >60 mL/min/1.73m2 Final     No results found for: \"GFRESTBLACK\"  Lab Results   Component Value Date    CR 0.81 08/16/2023     No results found for: \"MICROALBUMIN\"    Healthy Eating:  Healthy Eating Assessed Today: Yes  Meal planning/habits: " None  Meals include: Breakfast, Lunch, Dinner  Other: Patient reports eating smaller amounts  Beverages: Water  Has patient met with a dietitian in the past?: Yes    Being Active:  Being Active Assessed Today: Yes  Exercise:: Currently not exercising    Monitoring:  Monitoring Assessed Today: Yes  Blood Glucose Meter: CGM  Times checking blood sugar at home (number): 4  Times checking blood sugar at home (per): Day  Blood glucose trend: Decreasing      Taking Medications:  Diabetes Medication(s)       Biguanides       metFORMIN (GLUCOPHAGE XR) 500 MG 24 hr tablet Take 2 tablets (1,000 mg) by mouth 2 times daily (with meals)     Patient taking differently: Take 1,000 mg by mouth 2 times daily (with meals) 3 tablets - taking 1500 mg/day       Incretin Mimetic Agents       MOUNJARO 2.5 MG/0.5ML pen INJECT 2.5 MG UNDER THE SKIN EVERY 7 DAYS            Taking Medication Assessed Today: Yes  Current Treatments: Diet  Problems taking diabetes medications regularly?: No  Diabetes medication side effects?: No    Problem Solving:                 Reducing Risks:  Reducing Risks Assessed Today: Yes  Diabetes Risks: Age over 45 years, Sedentary Lifestyle  CAD Risks: Diabetes Mellitus, Male sex, Obesity, Sedentary lifestyle  Has dilated eye exam at least once a year?: Yes    Healthy Coping:  Healthy Coping Assessed Today: Yes  Emotional response to diabetes: Ready to learn  Stage of change: ACTION (Actively working towards change)  Patient Activation Measure Survey Score:       No data to display                  Care Plan and Education Provided:  Care Plan: Diabetes   Updates made by Lakshmi Blakely RD since 2/6/2024 12:00 AM        Problem: Diabetes Self-Management Education Needed to Optimize Self-Care Behaviors         Goal: Monitoring - monitor glucose and ketones as directed    This Visit's Progress: 100%   Recent Progress: 80%   Note:    Check BG 2-3 times daily with CGM           Time Spent: 25 minutes  Encounter  Type: Individual    Any diabetes medication dose changes were made via the CDE Protocol per the patient's referring provider. A copy of this encounter was shared with the provider.

## 2024-02-06 NOTE — TELEPHONE ENCOUNTER
Per [facility] messages 8/16/23 triamcinolone cream was prescribed for patient's alopecia. Patient updated via [employer].    AKIL StovallN, RN  [facility]

## 2024-02-13 ENCOUNTER — MYC REFILL (OUTPATIENT)
Dept: ALLERGY | Facility: CLINIC | Age: 49
End: 2024-02-13
Payer: COMMERCIAL

## 2024-02-13 DIAGNOSIS — L30.9 DERMATITIS: ICD-10-CM

## 2024-02-13 RX ORDER — CLOBETASOL PROPIONATE 0.5 MG/G
OINTMENT TOPICAL
Qty: 60 G | Refills: 3 | Status: CANCELLED | OUTPATIENT
Start: 2024-02-13

## 2024-02-13 RX ORDER — CLOBETASOL PROPIONATE 0.5 MG/G
OINTMENT TOPICAL
Qty: 60 G | Refills: 3 | Status: SHIPPED | OUTPATIENT
Start: 2024-02-13 | End: 2024-10-02

## 2024-02-13 NOTE — TELEPHONE ENCOUNTER
Routing refill request to provider for review/approval because:  Drug not on the [facility] refill protocol.    Pt was last seen on 6/26/2023 with request to be seen back in 3 months. No appointment scheduled at this time.     Eloina SCHOFIELD RN  Specialty/Allergy Clinics

## 2024-02-13 NOTE — TELEPHONE ENCOUNTER
Pt requesting, not filled by by Dr Schmitz previously   Refill Request  Medication name: Pending Prescriptions:                       Disp   Refills    clobetasol (TEMOVATE) 0.05 % external oin*60 g   3            Sig: Apply sparingly to affected area two times daily           as needed but not more than 14 days in a row.           Spare face, armpits, neck, and groin.    Requested Pharmacy:       [facility] [place], [place] - [address]

## 2024-02-14 ENCOUNTER — MYC REFILL (OUTPATIENT)
Dept: UROLOGY | Facility: CLINIC | Age: 49
End: 2024-02-14
Payer: COMMERCIAL

## 2024-02-14 DIAGNOSIS — N52.9 ERECTILE DYSFUNCTION, UNSPECIFIED ERECTILE DYSFUNCTION TYPE: ICD-10-CM

## 2024-02-14 RX ORDER — SILDENAFIL CITRATE 20 MG/1
20 TABLET ORAL 3 TIMES DAILY
Qty: 270 TABLET | Refills: 0 | Status: SHIPPED | OUTPATIENT
Start: 2024-02-14 | End: 2024-04-24

## 2024-02-14 NOTE — TELEPHONE ENCOUNTER
ONE TIME CATINA refill.   Last OV= 02/2023.   Does patient have an upcoming appointment? No.   Follow up: Send [employer] message to patient     Deena SCHOFIELD RN Urology 2/14/2024 3:35 PM

## 2024-02-14 NOTE — TELEPHONE ENCOUNTER
Rx was approved by Dr. Schmitz on 2/13/24. Closing note.    Desiree Martin RN on 2/14/2024 at 9:05 AM

## 2024-02-15 ENCOUNTER — TELEPHONE (OUTPATIENT)
Dept: UROLOGY | Facility: CLINIC | Age: 49
End: 2024-02-15
Payer: COMMERCIAL

## 2024-02-15 DIAGNOSIS — N52.9 ERECTILE DYSFUNCTION, UNSPECIFIED ERECTILE DYSFUNCTION TYPE: Primary | ICD-10-CM

## 2024-02-15 NOTE — TELEPHONE ENCOUNTER
.Prior Authorization Retail Medication Request    Medication/Dose: Sildenafil 20mg requires a Prior Auth  Diagnosis and ICD code (if different than what is on RX):  N/A  New/renewal/insurance change PA/secondary ins. PA:  Previously Tried and Failed:  n/a  Rationale:  N/A    Insurance   Primary: [employer]  Insurance ID:  [id number]    Secondary (if applicable):N/A  Insurance ID:  N/A    Pharmacy Information (if different than what is on RX)  Name:  [facility] [place]  Phone:  [phone]   Fax:[phone]

## 2024-02-21 ENCOUNTER — LAB (OUTPATIENT)
Dept: LAB | Facility: CLINIC | Age: 49
End: 2024-02-21
Payer: COMMERCIAL

## 2024-02-21 DIAGNOSIS — E11.9 CONTROLLED TYPE 2 DIABETES MELLITUS WITHOUT COMPLICATION, WITHOUT LONG-TERM CURRENT USE OF INSULIN (H): ICD-10-CM

## 2024-02-21 LAB
ANION GAP SERPL CALCULATED.3IONS-SCNC: 10 MMOL/L (ref 7–15)
BUN SERPL-MCNC: 12.2 MG/DL (ref 6–20)
CALCIUM SERPL-MCNC: 9.2 MG/DL (ref 8.6–10)
CHLORIDE SERPL-SCNC: 102 MMOL/L (ref 98–107)
CREAT SERPL-MCNC: 0.82 MG/DL (ref 0.67–1.17)
DEPRECATED HCO3 PLAS-SCNC: 26 MMOL/L (ref 22–29)
EGFRCR SERPLBLD CKD-EPI 2021: >90 ML/MIN/1.73M2
GLUCOSE SERPL-MCNC: 105 MG/DL (ref 70–99)
POTASSIUM SERPL-SCNC: 4.7 MMOL/L (ref 3.4–5.3)
SODIUM SERPL-SCNC: 138 MMOL/L (ref 135–145)

## 2024-02-21 PROCEDURE — 36415 COLL VENOUS BLD VENIPUNCTURE: CPT

## 2024-02-21 PROCEDURE — 80048 BASIC METABOLIC PNL TOTAL CA: CPT

## 2024-02-22 ENCOUNTER — MYC MEDICAL ADVICE (OUTPATIENT)
Dept: EDUCATION SERVICES | Facility: CLINIC | Age: 49
End: 2024-02-22
Payer: COMMERCIAL

## 2024-02-22 DIAGNOSIS — E11.9 CONTROLLED TYPE 2 DIABETES MELLITUS WITHOUT COMPLICATION, WITHOUT LONG-TERM CURRENT USE OF INSULIN (H): Primary | ICD-10-CM

## 2024-02-22 RX ORDER — TIRZEPATIDE 5 MG/.5ML
5 INJECTION, SOLUTION SUBCUTANEOUS
Qty: 2 ML | Refills: 1 | Status: SHIPPED | OUTPATIENT
Start: 2024-02-22 | End: 2024-04-22

## 2024-02-28 NOTE — TELEPHONE ENCOUNTER
Central Prior Authorization Team   Phone: [phone]    PA Initiation    Medication: Sildenafil 20mg  Insurance Company: [employer] - Phone [phone] Fax [phone]  Pharmacy Filling the Rx: [place], [place] - [address] [place] [address]  Filling Pharmacy Phone: [phone]  Filling Pharmacy Fax:    Start Date: 2/28/2024

## 2024-02-28 NOTE — TELEPHONE ENCOUNTER
Insurance requires more information. See below what insurance is asking for. Once response is documented, please route back to me or the PA team. Thank you.

## 2024-03-06 ENCOUNTER — TELEPHONE (OUTPATIENT)
Dept: FAMILY MEDICINE | Facility: CLINIC | Age: 49
End: 2024-03-06

## 2024-03-06 ENCOUNTER — VIRTUAL VISIT (OUTPATIENT)
Dept: FAMILY MEDICINE | Facility: CLINIC | Age: 49
End: 2024-03-06
Payer: COMMERCIAL

## 2024-03-06 DIAGNOSIS — J45.30 MILD PERSISTENT ASTHMA WITHOUT COMPLICATION: ICD-10-CM

## 2024-03-06 DIAGNOSIS — J45.31 MILD PERSISTENT ASTHMA WITH EXACERBATION: Primary | ICD-10-CM

## 2024-03-06 DIAGNOSIS — J32.9 CHRONIC SINUSITIS, UNSPECIFIED LOCATION: ICD-10-CM

## 2024-03-06 DIAGNOSIS — Z87.09 HISTORY OF ALLERGIC RHINITIS: ICD-10-CM

## 2024-03-06 DIAGNOSIS — H10.13 ALLERGIC CONJUNCTIVITIS, BILATERAL: ICD-10-CM

## 2024-03-06 DIAGNOSIS — J30.89 ALLERGIC RHINITIS DUE TO DUST MITE: ICD-10-CM

## 2024-03-06 PROCEDURE — 99214 OFFICE O/P EST MOD 30 MIN: CPT | Mod: 95 | Performed by: PHYSICIAN ASSISTANT

## 2024-03-06 RX ORDER — FLUTICASONE PROPIONATE 50 MCG
2 SPRAY, SUSPENSION (ML) NASAL DAILY
Qty: 16 G | Refills: 11 | Status: SHIPPED | OUTPATIENT
Start: 2024-03-06 | End: 2024-08-23

## 2024-03-06 RX ORDER — SILDENAFIL 25 MG/1
25 TABLET, FILM COATED ORAL DAILY PRN
Qty: 90 TABLET | Refills: 0 | Status: SHIPPED | OUTPATIENT
Start: 2024-03-06

## 2024-03-06 RX ORDER — AZELASTINE 1 MG/ML
2 SPRAY, METERED NASAL 2 TIMES DAILY
Qty: 30 ML | Refills: 11 | Status: SHIPPED | OUTPATIENT
Start: 2024-03-06

## 2024-03-06 RX ORDER — PREDNISONE 20 MG/1
40 TABLET ORAL DAILY
Qty: 13 TABLET | Refills: 0 | Status: SHIPPED | OUTPATIENT
Start: 2024-03-06 | End: 2024-04-24

## 2024-03-06 RX ORDER — ALBUTEROL SULFATE 90 UG/1
2 AEROSOL, METERED RESPIRATORY (INHALATION) EVERY 6 HOURS
Qty: 36 G | Refills: 3 | Status: SHIPPED | OUTPATIENT
Start: 2024-03-06

## 2024-03-06 RX ORDER — FLUTICASONE PROPIONATE AND SALMETEROL 100; 50 UG/1; UG/1
1 POWDER RESPIRATORY (INHALATION) EVERY 12 HOURS
Qty: 60 EACH | Refills: 2 | Status: SHIPPED | OUTPATIENT
Start: 2024-03-06 | End: 2024-05-22

## 2024-03-06 NOTE — TELEPHONE ENCOUNTER
RN received call from patient.    Patient states he developed a cough and nasal congestion 2 days ago.    Denies fever.  Coughing up clear sputim.    Patient has history of asthma.    Patient has covid test at home but has not taken it.    Patient asking if Paxlovid is used for only Covid.  RN advised it is only for Covid.      Patient asking for something to be prescribed for his cough/cold.    RN advised patient would need to have visit to discuss any prescription medication.    RN offered in person visit today.    RN also discussed E-vist or virtual visit.    RN assisted patient in scheduling virtual visit.    Ace Mendoza RN, BSN, PHN  [facility]

## 2024-03-06 NOTE — PROGRESS NOTES
Juanjose is a 48 year old who is being evaluated via a billable video visit.      How would you like to obtain your AVS? MyChart  If the video visit is dropped, the invitation should be resent by: Text to cell phone: [phone]  Will anyone else be joining your video visit? No          Assessment & Plan     Mild persistent asthma with exacerbation  Patient presents with asthma exacerbation. He is stable during visit, no concerns for respiratory distress. Will treat out patient  with a course of prednisone. I do not recommend antibiotic at this time. In addition advise to take rescue inhaler consistently every 4-6 hrs until feeling better. Lots of rest, fluids, and humidifier in bedroom and steam shows. If symptoms do not improve then follow up in clinic. If symptoms worsen or change go to ER. Patient agree's with this plan and has no further questions  - predniSONE (DELTASONE) 20 MG tablet; Take 2 tablets (40 mg) by mouth daily For 5 days then 1 tablet 3 days then stop    Mild persistent asthma without complication  Chronic, stable on current medication, refills sent  - fluticasone-salmeterol (ADVAIR DISKUS) 100-50 MCG/ACT inhaler; Inhale 1 puff into the lungs every 12 hours    History of allergic rhinitis  Chronic, stable on current medication, refills sent  - albuterol (PROAIR HFA/PROVENTIL HFA/VENTOLIN HFA) 108 (90 Base) MCG/ACT inhaler; Inhale 2 puffs into the lungs every 6 hours    Allergic rhinitis due to dust mite  Chronic, stable on current medication, refills sent  - azelastine (ASTELIN) 0.1 % nasal spray; Spray 2 sprays into both nostrils 2 times daily  - fluticasone (FLONASE) 50 MCG/ACT nasal spray; Spray 2 sprays into both nostrils daily    Allergic conjunctivitis, bilateral  Chronic, stable, continue with current treatment regimen  - azelastine (ASTELIN) 0.1 % nasal spray; Spray 2 sprays into both nostrils 2 times daily    Chronic sinusitis, unspecified location  Chronic, stable, continue with current  "treatment regimen  - albuterol (PROAIR HFA/PROVENTIL HFA/VENTOLIN HFA) 108 (90 Base) MCG/ACT inhaler; Inhale 2 puffs into the lungs every 6 hours            BMI  Estimated body mass index is 31.1 kg/m  as calculated from the following:    Height as of 6/26/23: 1.803 m (5' 11\").    Weight as of 6/26/23: 101.2 kg (223 lb).             Jose Guadalupe Sow is a 48 year old, presenting for the following health issues:  URI    History of Present Illness       Reason for visit:  Cold  Symptom onset:  1-3 days ago  Symptoms include:  Cough cold  Symptom intensity:  Moderate  Symptom progression:  Worsening  Had these symptoms before:  No    He eats 0-1 servings of fruits and vegetables daily.He consumes 0 sweetened beverage(s) daily.He exercises with enough effort to increase his heart rate 9 or less minutes per day.  He exercises with enough effort to increase his heart rate 3 or less days per week.   He is taking medications regularly.  Additional provider notes :    URI - Symptoms started 5 days ago. Having a dry hacking cough. Having more wheezing and SOB with this cough. Is having nasal congestion and sinus congestion. Has hx of asthma and he has started to use his albuterol consistently for the last few days in addition to his daily Advair and Singular. He also has been using his nasal sprays with little relief. No fevers. No body aches. He is very tired as he is not sleeping well due to cough.         Review of Systems  Constitutional, HEENT, cardiovascular, pulmonary, gi and gu systems are negative, except as otherwise noted.      Objective    Vitals - Patient Reported  Pain Score: No Pain (0)        Physical Exam   GENERAL: alert and no distress  EYES: Eyes grossly normal to inspection.  No discharge or erythema, or obvious scleral/conjunctival abnormalities.  RESP: No audible wheeze, or visible cyanosis.  Dry cough noted during visit  SKIN: Visible skin clear. No significant rash, abnormal pigmentation or " lesions.  NEURO: Cranial nerves grossly intact.  Mentation and speech appropriate for age.  PSYCH: Appropriate affect, tone, and pace of words          Video-Visit Details    Type of service:  Video Visit     Originating Location (pt. Location): Home    Distant Location (provider location):  On-site  Platform used for Video Visit: Domingo  Signed Electronically by: MARISOL Grant

## 2024-03-20 ENCOUNTER — MYC REFILL (OUTPATIENT)
Dept: ALLERGY | Facility: CLINIC | Age: 49
End: 2024-03-20
Payer: COMMERCIAL

## 2024-03-20 ENCOUNTER — MYC REFILL (OUTPATIENT)
Dept: INTERNAL MEDICINE | Facility: CLINIC | Age: 49
End: 2024-03-20
Payer: COMMERCIAL

## 2024-03-20 DIAGNOSIS — J30.1 SEASONAL ALLERGIC RHINITIS DUE TO POLLEN: ICD-10-CM

## 2024-03-20 DIAGNOSIS — J30.89 ALLERGIC RHINITIS CAUSED BY MOLD: ICD-10-CM

## 2024-03-20 DIAGNOSIS — H10.13 ALLERGIC CONJUNCTIVITIS, BILATERAL: ICD-10-CM

## 2024-03-20 DIAGNOSIS — E11.9 TYPE 2 DIABETES MELLITUS WITHOUT COMPLICATION, WITHOUT LONG-TERM CURRENT USE OF INSULIN (H): ICD-10-CM

## 2024-03-20 DIAGNOSIS — J45.20 MILD INTERMITTENT ASTHMA, UNSPECIFIED WHETHER COMPLICATED: ICD-10-CM

## 2024-03-20 DIAGNOSIS — J30.81 ALLERGIC RHINITIS DUE TO ANIMALS: ICD-10-CM

## 2024-03-20 DIAGNOSIS — J30.89 ALLERGIC RHINITIS DUE TO DUST MITE: ICD-10-CM

## 2024-03-20 RX ORDER — LORATADINE AND PSEUDOEPHEDRINE SULFATE 10; 240 MG/1; MG/1
1 TABLET, FILM COATED, EXTENDED RELEASE ORAL DAILY
Qty: 30 TABLET | Refills: 1 | Status: SHIPPED | OUTPATIENT
Start: 2024-03-20

## 2024-03-20 RX ORDER — METFORMIN HCL 500 MG
1000 TABLET, EXTENDED RELEASE 24 HR ORAL 2 TIMES DAILY WITH MEALS
Qty: 120 TABLET | Refills: 2 | Status: SHIPPED | OUTPATIENT
Start: 2024-03-20 | End: 2024-06-28

## 2024-03-20 NOTE — TELEPHONE ENCOUNTER
azelastine (OPTIVAR) 0.05 % ophthalmic solution   Last Written Prescription Date: 10/10/2023  Last Fill Quantity: 6 ml,   # refills: 3  Last Office Visit : 10/10/2023  Future Office visit:  none  Routing azelastine (OPTIVAR) 0.05 % ophthalmic solution refill request to provider for review/approval because: Medication not on the Dermatology refill protocol.

## 2024-03-21 RX ORDER — AZELASTINE HYDROCHLORIDE 0.5 MG/ML
1 SOLUTION/ DROPS OPHTHALMIC 2 TIMES DAILY
Qty: 6 ML | Refills: 5 | Status: SHIPPED | OUTPATIENT
Start: 2024-03-21 | End: 2024-08-09

## 2024-03-22 ENCOUNTER — MYC REFILL (OUTPATIENT)
Dept: ALLERGY | Facility: CLINIC | Age: 49
End: 2024-03-22
Payer: COMMERCIAL

## 2024-03-22 DIAGNOSIS — J30.89 ALLERGIC RHINITIS DUE TO DUST MITE: ICD-10-CM

## 2024-03-22 DIAGNOSIS — J45.20 MILD INTERMITTENT ASTHMA, UNSPECIFIED WHETHER COMPLICATED: ICD-10-CM

## 2024-03-22 DIAGNOSIS — H10.13 ALLERGIC CONJUNCTIVITIS, BILATERAL: ICD-10-CM

## 2024-03-22 RX ORDER — LEVOCETIRIZINE DIHYDROCHLORIDE 5 MG/1
5 TABLET, FILM COATED ORAL 2 TIMES DAILY
Qty: 60 TABLET | Refills: 3 | Status: SHIPPED | OUTPATIENT
Start: 2024-03-22 | End: 2024-06-23

## 2024-03-22 NOTE — TELEPHONE ENCOUNTER
10/10/2023  [facility] [place]     Tank Joseph MD  Dermatology Allergic rhinitis due to dust mite +2 more      Refilled per protocol.

## 2024-03-31 ENCOUNTER — MYC REFILL (OUTPATIENT)
Dept: FAMILY MEDICINE | Facility: CLINIC | Age: 49
End: 2024-03-31
Payer: COMMERCIAL

## 2024-03-31 DIAGNOSIS — G47.33 OSA (OBSTRUCTIVE SLEEP APNEA): ICD-10-CM

## 2024-04-01 RX ORDER — MODAFINIL 200 MG/1
200 TABLET ORAL DAILY
Qty: 30 TABLET | Refills: 0 | Status: SHIPPED | OUTPATIENT
Start: 2024-04-01

## 2024-04-13 ENCOUNTER — HEALTH MAINTENANCE LETTER (OUTPATIENT)
Age: 49
End: 2024-04-13

## 2024-04-21 ENCOUNTER — MYC REFILL (OUTPATIENT)
Dept: ALLERGY | Facility: CLINIC | Age: 49
End: 2024-04-21
Payer: COMMERCIAL

## 2024-04-21 ENCOUNTER — MYC REFILL (OUTPATIENT)
Dept: EDUCATION SERVICES | Facility: CLINIC | Age: 49
End: 2024-04-21
Payer: COMMERCIAL

## 2024-04-21 DIAGNOSIS — J30.89 ALLERGIC RHINITIS DUE TO DUST MITE: ICD-10-CM

## 2024-04-21 DIAGNOSIS — H10.13 ALLERGIC CONJUNCTIVITIS, BILATERAL: ICD-10-CM

## 2024-04-21 DIAGNOSIS — J45.20 MILD INTERMITTENT ASTHMA, UNSPECIFIED WHETHER COMPLICATED: ICD-10-CM

## 2024-04-21 DIAGNOSIS — E11.9 CONTROLLED TYPE 2 DIABETES MELLITUS WITHOUT COMPLICATION, WITHOUT LONG-TERM CURRENT USE OF INSULIN (H): ICD-10-CM

## 2024-04-22 DIAGNOSIS — E11.9 CONTROLLED TYPE 2 DIABETES MELLITUS WITHOUT COMPLICATION, WITHOUT LONG-TERM CURRENT USE OF INSULIN (H): ICD-10-CM

## 2024-04-22 RX ORDER — TIRZEPATIDE 5 MG/.5ML
INJECTION, SOLUTION SUBCUTANEOUS
Qty: 2 ML | Refills: 1 | Status: SHIPPED | OUTPATIENT
Start: 2024-04-22 | End: 2024-06-23

## 2024-04-22 RX ORDER — MONTELUKAST SODIUM 10 MG/1
10 TABLET ORAL AT BEDTIME
Qty: 90 TABLET | Refills: 1 | OUTPATIENT
Start: 2024-04-22

## 2024-04-22 RX ORDER — TIRZEPATIDE 5 MG/.5ML
5 INJECTION, SOLUTION SUBCUTANEOUS
Qty: 2 ML | Refills: 1 | OUTPATIENT
Start: 2024-04-22

## 2024-04-22 NOTE — TELEPHONE ENCOUNTER
Pt needs follow up appt before any further refills will be given. Pt can see PCP for this medications as well.

## 2024-04-24 ENCOUNTER — OFFICE VISIT (OUTPATIENT)
Dept: FAMILY MEDICINE | Facility: CLINIC | Age: 49
End: 2024-04-24
Payer: COMMERCIAL

## 2024-04-24 VITALS
DIASTOLIC BLOOD PRESSURE: 80 MMHG | HEART RATE: 85 BPM | BODY MASS INDEX: 28.98 KG/M2 | OXYGEN SATURATION: 98 % | SYSTOLIC BLOOD PRESSURE: 120 MMHG | HEIGHT: 71 IN | TEMPERATURE: 97.3 F | RESPIRATION RATE: 13 BRPM | WEIGHT: 207 LBS

## 2024-04-24 DIAGNOSIS — J45.20 MILD INTERMITTENT ASTHMA, UNSPECIFIED WHETHER COMPLICATED: ICD-10-CM

## 2024-04-24 DIAGNOSIS — J30.89 ALLERGIC RHINITIS DUE TO DUST MITE: ICD-10-CM

## 2024-04-24 DIAGNOSIS — E11.65 TYPE 2 DIABETES MELLITUS WITH HYPERGLYCEMIA, WITHOUT LONG-TERM CURRENT USE OF INSULIN (H): Primary | ICD-10-CM

## 2024-04-24 DIAGNOSIS — H10.13 ALLERGIC CONJUNCTIVITIS, BILATERAL: ICD-10-CM

## 2024-04-24 DIAGNOSIS — E78.5 HYPERLIPIDEMIA, UNSPECIFIED HYPERLIPIDEMIA TYPE: Chronic | ICD-10-CM

## 2024-04-24 LAB
HBA1C MFR BLD: 6.2 % (ref 0–5.6)
HOLD SPECIMEN: NORMAL

## 2024-04-24 PROCEDURE — 36415 COLL VENOUS BLD VENIPUNCTURE: CPT | Performed by: FAMILY MEDICINE

## 2024-04-24 PROCEDURE — 83036 HEMOGLOBIN GLYCOSYLATED A1C: CPT | Performed by: FAMILY MEDICINE

## 2024-04-24 PROCEDURE — 82043 UR ALBUMIN QUANTITATIVE: CPT | Performed by: FAMILY MEDICINE

## 2024-04-24 PROCEDURE — 80053 COMPREHEN METABOLIC PANEL: CPT | Performed by: FAMILY MEDICINE

## 2024-04-24 PROCEDURE — 82570 ASSAY OF URINE CREATININE: CPT | Performed by: FAMILY MEDICINE

## 2024-04-24 PROCEDURE — 80061 LIPID PANEL: CPT | Performed by: FAMILY MEDICINE

## 2024-04-24 PROCEDURE — 99214 OFFICE O/P EST MOD 30 MIN: CPT | Performed by: FAMILY MEDICINE

## 2024-04-24 RX ORDER — MONTELUKAST SODIUM 10 MG/1
10 TABLET ORAL AT BEDTIME
Qty: 90 TABLET | Refills: 3 | Status: SHIPPED | OUTPATIENT
Start: 2024-04-24

## 2024-04-24 ASSESSMENT — PAIN SCALES - GENERAL: PAINLEVEL: NO PAIN (0)

## 2024-04-24 NOTE — PROGRESS NOTES
Assessment/Plan:    Controlled type 2 diabetes mellitus without complication, without long-term current use of insulin (H)  Type 2 diabetes with significant improvement.  Prior A1c of 10.6% now down to 6.2%.  Freestyle sherlyn device showing significant improvement in glycemic control and has been able to wean from metformin  mg when she was using 4 tablets a day down to current 2 tablets a day while continuing Mounjaro 5 mg weekly.  Patient declines further dose adjustment of Mounjaro at this time and will continue attempts at weight goal less than 200 pounds initially, less than 190 pounds ideally.  Update microalbumin screen.  Diabetic recheck at physical exam in 4 months anticipated for monofilament testing etc.  Annual diabetic eye exams to continue.  - Lipid panel reflex to direct LDL Non-fasting  - Albumin Random Urine Quantitative with Creat Ratio  - Hemoglobin A1c  - Hemoglobin A1c  - Comprehensive metabolic panel  - Albumin Random Urine Quantitative with Creat Ratio  - Lipid panel reflex to direct LDL Non-fasting  - Comprehensive metabolic panel    Hyperlipidemia, unspecified hyperlipidemia type  Hyperlipidemia.  Nonfasting status.  Will update with recommendation for statin therapy with underlying type 2 diabetes.  Repeat fasting labs at physical exam likely.  - Comprehensive metabolic panel  - Comprehensive metabolic panel    Mild intermittent asthma, unspecified whether complicated  Intermittent asthma/allergies.  Montelukast 10 mg every afternoon with benefit.  - montelukast (SINGULAIR) 10 MG tablet  Dispense: 90 tablet; Refill: 3    Allergic rhinitis due to dust mite  Montelukast 10 mg every afternoon.  Addition of cetirizine 10 mg daily.  Fluticasone nasal spray using 2 sprays per nostril daily recommended with seasonal component.  - montelukast (SINGULAIR) 10 MG tablet  Dispense: 90 tablet; Refill: 3    Allergic conjunctivitis, bilateral  Benefits of montelukast 10 mg every afternoon.  Addition  of cetirizine 10 mg daily.  - montelukast (SINGULAIR) 10 MG tablet  Dispense: 90 tablet; Refill: 3               Subjective:    Silviano Collins is seen today for follow-up assessment.  Type 2 diabetes reviewed.  A1c previously increasing from 8.2% up to 10.6% 2023.  Diabetes educator referral.  Had been started on metformin XR titrated to 2000 mg daily but now has weaned it to 1000 mg daily with breakfast since starting Mounjaro and has titrated from 2-1/2 mg a week up to 5 mg a week.  Decreased appetite.  Feeling well.  Blood sugars have improved significantly.  Not on statin therapy with history of hyperlipidemia.  Nonfasting today.  Allergies and intermittent asthma described.  Bilateral conjunctivitis associated with allergies as well.  Comprehensive review of systems as above otherwise all negative.       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)  Hobbies:  boating, [employer], water skiing      Past Surgical History:   Procedure Laterality Date    HC TOOTH EXTRACTION W/FORCEP      4 wisdom teeth removed withotu complications        Family History   Problem Relation Age of Onset    Diabetes Mother         D: 40 pneumonia,  Type I DM    Family History Negative Father     Family History Negative Brother         Past Medical History:   Diagnosis Date    Allergic rhinitis due to other allergen     seasonal allergies (spring and fall)    Mild intermittent asthma     exercise, seasonal, associated with URIs        Social History     Tobacco Use    Smoking status: Former    Smokeless tobacco: Never   Vaping Use    Vaping status: Never Used   Substance Use Topics    Alcohol use: Yes     Comment: 2x month    Drug use: Never        Current Outpatient Medications   Medication Sig Dispense Refill     albuterol (PROAIR HFA/PROVENTIL HFA/VENTOLIN HFA) 108 (90 Base) MCG/ACT inhaler Inhale 2 puffs into the lungs every 6 hours 36 g 3    alcohol swab prep pads Use to swab area of injection/dominick as directed. 100 each 0    azelastine (ASTELIN) 0.1 % nasal spray Spray 2 sprays into both nostrils 2 times daily 30 mL 11    azelastine (OPTIVAR) 0.05 % ophthalmic solution Place 1 drop into both eyes 2 times daily 6 mL 5    blood glucose (NO BRAND SPECIFIED) test strip Use to test blood sugar once daily or as directed. To accompany: Blood Glucose Monitor Brands: per insurance. 100 strip 0    blood glucose calibration (NO BRAND SPECIFIED) solution To accompany: Blood Glucose Monitor Brands: per insurance. 1 each 0    blood glucose monitoring (NO BRAND SPECIFIED) meter device kit Use to test blood sugar one time daily or as directed. Preferred blood glucose meter OR supplies to accompany: Blood Glucose Monitor Brands: per insurance. 1 kit 0    clobetasol (TEMOVATE) 0.05 % external ointment Apply sparingly to affected area two times daily as needed but not more than 14 days in a row. Spare face, armpits, neck, and groin. 60 g 3    Continuous Blood Gluc  (DEXCOM G6 ) EVA Use to read blood sugars as per 's instructions. 1 each 0    Continuous Blood Gluc Sensor (FREESTYLE EKATERINA 3 SENSOR) [facility] 1 each every 14 days 2 each 4    fluticasone (FLONASE) 50 MCG/ACT nasal spray Spray 2 sprays into both nostrils daily 16 g 11    fluticasone-salmeterol (ADVAIR DISKUS) 100-50 MCG/ACT inhaler Inhale 1 puff into the lungs every 12 hours 60 each 2    ketotifen fumarate 0.035% 0.035 % SOLN ophthalmic solution Place 1 drop into both eyes every 8 hours 5 mL 0    levocetirizine (XYZAL) 5 MG tablet Take 1 tablet (5 mg) by mouth 2 times daily 60 tablet 3    loratadine-pseudoePHEDrine (SM ALLERGY RELIEF D)  MG 24 hr tablet Take 1 tablet by mouth daily 30 tablet 1    metFORMIN (GLUCOPHAGE XR) 500 MG 24 hr tablet TAKE  "TWO TABLETS BY MOUTH TWICE A DAY WITH MEALS 120 tablet 2    modafinil (PROVIGIL) 200 MG tablet Take 1 tablet (200 mg) by mouth daily Take 1 tab every morning for sleepiness due to KRISTEL 30 tablet 0    montelukast (SINGULAIR) 10 MG tablet Take 1 tablet (10 mg) by mouth at bedtime 90 tablet 3    MOUNJARO 5 MG/0.5ML pen INJECT 5 MG UNDER THE SKIN EVERY 7 DAYS 2 mL 1    MULTI-VITAMIN OR TABS       sertraline (ZOLOFT) 50 MG tablet Take 1 tablet (50 mg) by mouth daily as needed (sex) Take one tablet 8-12 hours before sex 30 tablet 1    sildenafil (VIAGRA) 25 MG tablet Take 1 tablet (25 mg) by mouth daily as needed 90 tablet 0    thin (NO BRAND SPECIFIED) lancets Use with lanceting device. To accompany: Blood Glucose Monitor Brands: per insurance. 100 each 0    triamcinolone (KENALOG) 0.1 % external ointment Apply topically 2 times daily 30 g 1    valACYclovir (VALTREX) 500 MG tablet TAKE 1 TABLET (500 MG) BY MOUTH 2 TIMES DAILY 30 tablet 0    Continuous Blood Gluc Sensor (DEXCOM G6 SENSOR) MISC Change every 10 days. (Patient not taking: Reported on 4/24/2024) 3 each 5    Continuous Blood Gluc Transmit (DEXCOM G6 TRANSMITTER) MISC Change every 3 months. (Patient not taking: Reported on 4/24/2024) 1 each 1    MOUNJARO 2.5 MG/0.5ML pen INJECT 2.5 MG UNDER THE SKIN EVERY 7 DAYS (Patient not taking: Reported on 4/24/2024) 2 mL 1    predniSONE (DELTASONE) 20 MG tablet Take 2 tablets (40 mg) by mouth daily For 5 days then 1 tablet 3 days then stop (Patient not taking: Reported on 4/24/2024) 13 tablet 0    sildenafil (REVATIO) 20 MG tablet Take 1 tablet (20 mg) by mouth 3 times daily (Patient not taking: Reported on 4/24/2024) 270 tablet 0          Objective:    Vitals:    04/24/24 0845   BP: 120/80   Pulse: 85   Resp: 13   Temp: 97.3  F (36.3  C)   SpO2: 98%   Weight: 93.9 kg (207 lb)   Height: 1.791 m (5' 10.5\")      Body mass index is 29.28 kg/m .    Alert.  No apparent distress.  Nasal congestion associated with described " seasonal allergies currently.  Chest otherwise clear.  Cardiac exam regular.  Extremities warm and dry.      This note has been dictated using voice recognition software and as a result may contain minor grammatical errors and unintended word substitutions.         Answers submitted by the patient for this visit:  Diabetes Visit (Submitted on 4/23/2024)  Chief Complaint: Chronic problems general questions HPI Form  Frequency of checking blood sugars:: two times daily  What time of day are you checking your blood sugars : after meals  Have you had any blood sugars above 200?: Yes  Have you had any blood sugars below 70?: No  Hypoglycemia symptoms:: none  Diabetic concerns:: frequent infections, blood sugar frequently over 200  Paraesthesia present:: numbness in feet, redness, sores, or blisters on feet, excessive thirst, blurry vision  General Questionnaire (Submitted on 4/23/2024)  Chief Complaint: Chronic problems general questions HPI Form  How many servings of fruits and vegetables do you eat daily?: 0-1  On average, how many sweetened beverages do you drink each day (Examples: soda, juice, sweet tea, etc.  Do NOT count diet or artificially sweetened beverages)?: 1  How many minutes a day do you exercise enough to make your heart beat faster?: 10 to 19  How many days a week do you exercise enough to make your heart beat faster?: 3 or less  How many days per week do you miss taking your medication?: 7  What makes it hard for you to take your medication every day?: other

## 2024-04-25 ENCOUNTER — MYC REFILL (OUTPATIENT)
Dept: FAMILY MEDICINE | Facility: CLINIC | Age: 49
End: 2024-04-25
Payer: COMMERCIAL

## 2024-04-25 DIAGNOSIS — E78.5 HYPERLIPIDEMIA, UNSPECIFIED HYPERLIPIDEMIA TYPE: Primary | ICD-10-CM

## 2024-04-25 DIAGNOSIS — E11.9 CONTROLLED TYPE 2 DIABETES MELLITUS WITHOUT COMPLICATION, WITHOUT LONG-TERM CURRENT USE OF INSULIN (H): ICD-10-CM

## 2024-04-25 LAB
ALBUMIN SERPL BCG-MCNC: 4.4 G/DL (ref 3.5–5.2)
ALP SERPL-CCNC: 66 U/L (ref 40–150)
ALT SERPL W P-5'-P-CCNC: 43 U/L (ref 0–70)
ANION GAP SERPL CALCULATED.3IONS-SCNC: 12 MMOL/L (ref 7–15)
AST SERPL W P-5'-P-CCNC: 33 U/L (ref 0–45)
BILIRUB SERPL-MCNC: 0.5 MG/DL
BUN SERPL-MCNC: 11.3 MG/DL (ref 6–20)
CALCIUM SERPL-MCNC: 9.1 MG/DL (ref 8.6–10)
CHLORIDE SERPL-SCNC: 102 MMOL/L (ref 98–107)
CHOLEST SERPL-MCNC: 210 MG/DL
CREAT SERPL-MCNC: 0.79 MG/DL (ref 0.67–1.17)
CREAT UR-MCNC: 118 MG/DL
DEPRECATED HCO3 PLAS-SCNC: 23 MMOL/L (ref 22–29)
EGFRCR SERPLBLD CKD-EPI 2021: >90 ML/MIN/1.73M2
GLUCOSE SERPL-MCNC: 116 MG/DL (ref 70–99)
HDLC SERPL-MCNC: 66 MG/DL
LDLC SERPL CALC-MCNC: 105 MG/DL
MICROALBUMIN UR-MCNC: <12 MG/L
MICROALBUMIN/CREAT UR: NORMAL MG/G{CREAT}
NONHDLC SERPL-MCNC: 144 MG/DL
POTASSIUM SERPL-SCNC: 4.7 MMOL/L (ref 3.4–5.3)
PROT SERPL-MCNC: 7.1 G/DL (ref 6.4–8.3)
SODIUM SERPL-SCNC: 137 MMOL/L (ref 135–145)
TRIGL SERPL-MCNC: 194 MG/DL

## 2024-04-25 RX ORDER — ROSUVASTATIN CALCIUM 10 MG/1
10 TABLET, COATED ORAL DAILY
Qty: 30 TABLET | Refills: 5 | Status: SHIPPED | OUTPATIENT
Start: 2024-04-25

## 2024-04-25 RX ORDER — TIRZEPATIDE 5 MG/.5ML
INJECTION, SOLUTION SUBCUTANEOUS
Qty: 2 ML | Refills: 1 | OUTPATIENT
Start: 2024-04-25

## 2024-04-26 ENCOUNTER — TELEPHONE (OUTPATIENT)
Dept: FAMILY MEDICINE | Facility: CLINIC | Age: 49
End: 2024-04-26
Payer: COMMERCIAL

## 2024-04-26 NOTE — TELEPHONE ENCOUNTER
Prior Authorization Retail Medication Request    Medication/Dose: MOUNJARO 5 MG/0.5ML pen     Previously Tried and Failed:  Patient needs PA for 8ml because he takes two pens at once    Insurance   Primary: [employer]  Insurance ID:  [id number]    Pharmacy Information (if different than what is on RX)  Name:  [facility]  Phone:  [phone]  Fax:[phone]

## 2024-05-10 NOTE — TELEPHONE ENCOUNTER
Central Prior Authorization Team   Phone: [phone]    PA Initiation    Medication: MOUNJARO 5 MG/0.5ML pen   Insurance Company: "[employer]" - Phone [phone] Fax [phone]  Pharmacy Filling the Rx: [facility] [place], [place] - [facility]  Filling Pharmacy Phone: [phone]  Filling Pharmacy Fax:    Start Date: 5/10/2024    Patient already picked this medication up.

## 2024-05-12 ASSESSMENT — ANXIETY QUESTIONNAIRES
8. IF YOU CHECKED OFF ANY PROBLEMS, HOW DIFFICULT HAVE THESE MADE IT FOR YOU TO DO YOUR WORK, TAKE CARE OF THINGS AT HOME, OR GET ALONG WITH OTHER PEOPLE?: SOMEWHAT DIFFICULT
6. BECOMING EASILY ANNOYED OR IRRITABLE: SEVERAL DAYS
7. FEELING AFRAID AS IF SOMETHING AWFUL MIGHT HAPPEN: NOT AT ALL
GAD7 TOTAL SCORE: 3
IF YOU CHECKED OFF ANY PROBLEMS ON THIS QUESTIONNAIRE, HOW DIFFICULT HAVE THESE PROBLEMS MADE IT FOR YOU TO DO YOUR WORK, TAKE CARE OF THINGS AT HOME, OR GET ALONG WITH OTHER PEOPLE: SOMEWHAT DIFFICULT
2. NOT BEING ABLE TO STOP OR CONTROL WORRYING: NOT AT ALL
5. BEING SO RESTLESS THAT IT IS HARD TO SIT STILL: SEVERAL DAYS
7. FEELING AFRAID AS IF SOMETHING AWFUL MIGHT HAPPEN: NOT AT ALL
3. WORRYING TOO MUCH ABOUT DIFFERENT THINGS: NOT AT ALL
1. FEELING NERVOUS, ANXIOUS, OR ON EDGE: NOT AT ALL
4. TROUBLE RELAXING: SEVERAL DAYS
GAD7 TOTAL SCORE: 3

## 2024-05-12 ASSESSMENT — PATIENT HEALTH QUESTIONNAIRE - PHQ9: SUM OF ALL RESPONSES TO PHQ QUESTIONS 1-9: 1

## 2024-05-13 ENCOUNTER — VIRTUAL VISIT (OUTPATIENT)
Dept: PSYCHOLOGY | Facility: CLINIC | Age: 49
End: 2024-05-13
Payer: COMMERCIAL

## 2024-05-13 DIAGNOSIS — F43.23 ADJUSTMENT DISORDER WITH MIXED ANXIETY AND DEPRESSED MOOD: Primary | ICD-10-CM

## 2024-05-13 PROCEDURE — 90832 PSYTX W PT 30 MINUTES: CPT | Mod: 93 | Performed by: MARRIAGE & FAMILY THERAPIST

## 2024-05-13 ASSESSMENT — PATIENT HEALTH QUESTIONNAIRE - PHQ9
10. IF YOU CHECKED OFF ANY PROBLEMS, HOW DIFFICULT HAVE THESE PROBLEMS MADE IT FOR YOU TO DO YOUR WORK, TAKE CARE OF THINGS AT HOME, OR GET ALONG WITH OTHER PEOPLE: NOT DIFFICULT AT ALL
SUM OF ALL RESPONSES TO PHQ QUESTIONS 1-9: 1

## 2024-05-13 ASSESSMENT — ANXIETY QUESTIONNAIRES: GAD7 TOTAL SCORE: 3

## 2024-05-13 NOTE — PROGRESS NOTES
"[facility]                                     Progress Note    Patient Name: Silviano Collins  Date: 5/13/24         Service Type: Individual      Session Start Time: 11:30  Session End Time: 11:51     Session Length: 21    Session #: 5    Attendees: Client attended alone    Service Modality:  Phone Visit:      The patient has been notified of the following:      \"We have found that certain health care needs can be provided without the need for a face to face visit.  This service lets us provide the care you need with a phone conversation.       I will have full access to your [facility] medical record during this entire phone call.   I will be taking notes for your medical record.      Since this is like an office visit, we will bill your insurance company for this service.       There are potential benefits and risks of telephone visits (e.g. limits to patient confidentiality) that differ from in-person visits.?  Confidentiality still applies for telephone services, and nobody will record the visit.  It is important to be in a quiet, private space that is free of distractions (including cell phone or other devices) during the visit.??      If during the course of the call I believe a telephone visit is not appropriate, you will not be charged for this service\"     Consent has been obtained for this service by care team member: Yes    Telephone Visit: The patient's condition can be safely assessed and treated via synchronous audio telemedicine encounter.      Reason for Audio Telemedicine Visit: Patient has requested telehealth visit and Services only offered telehealth    Originating Site (Patient Location): Patient's other vehicle    Distant Site (Provider Location): Provider Remote Setting- Home Office     DATA  Interactive Complexity: No  Crisis: No        Progress Since Last Session (Related to Symptoms / Goals / Homework):   Symptoms:  ongoing interpersonal conflict    Homework: " Partially completed      Episode of Care Goals: Minimal progress - ACTION (Actively working towards change); Intervened by reinforcing change plan / affirming steps taken     Current / Ongoing Stressors and Concerns:   Patient reported regarding ongoing interpersonal conflict with his spouse.  Patient reported regarding experiencing ongoing differing wants for socialization, a lack of intimacy, and differing opinions on their need for couple's therapy and values.     Treatment Objective(s) Addressed in This Session:   learn & utilize at least some assertive communication skills weekly  Patient identified his desired continued therapy goal.     Intervention:   Interpersonal Therapy: re-reviewed with patient using assertive/direct/communication with his spouse to discuss values differences, relationship wants, and goals for couple's therapy.  Motivational Interviewing: reviewed with patient his desired continued therapy goal.    Assessments completed prior to visit:  The following assessments were completed by patient for this visit:  PHQ9:       9/19/2023     1:13 PM 9/25/2023    11:57 AM 11/10/2023     9:22 AM 11/21/2023     8:05 AM 12/12/2023     1:51 PM 1/30/2024     9:55 AM 5/12/2024     8:15 PM   PHQ-9 SCORE   PHQ-9 Total Score MyChart 2 (Minimal depression) 0 0 0 0 0 1 (Minimal depression)   PHQ-9 Total Score 2 0 0 0 0 0 1     GAD7:       9/25/2023    11:58 AM 11/10/2023     9:23 AM 12/12/2023     1:51 PM 1/30/2024     9:56 AM 5/12/2024     8:16 PM   SOLIS-7 SCORE   Total Score 0 (minimal anxiety) 0 (minimal anxiety) 0 (minimal anxiety) 0 (minimal anxiety) 3 (minimal anxiety)   Total Score 0 0 0 0 3         ASSESSMENT: Current Emotional / Mental Status (status of significant symptoms):   Risk status (Self / Other harm or suicidal ideation)   Patient denies current fears or concerns for personal safety.   Patient denies current or recent suicidal ideation or behaviors.   Patient denies current or recent homicidal  ideation or behaviors.   Patient denies current or recent self injurious behavior or ideation.   Patient denies other safety concerns.   Patient reports there has been no change in risk factors since their last session.     Patient reports there has been no change in protective factors since their last session.     Recommended that patient call 911 or go to the local ED should there be a change in any of these risk factors.     Appearance:   Appropriate    Eye Contact:   Good    Psychomotor Behavior: Normal    Attitude:   Cooperative  Interested Friendly Pleasant Attentive   Orientation:   All   Speech    Rate / Production: Normal/ Responsive Talkative    Volume:  Normal    Mood:    Anxious  Normal   Affect:    Appropriate  Subdued  Worrisome    Thought Content:  Clear  Rumination    Thought Form:  Coherent  Goal Directed  Logical    Insight:    Good  and Intellectual Insight     Medication Review:   No changes to current psychiatric medication(s)     Medication Compliance:   Yes     Changes in Health Issues:   None reported     Chemical Use Review:   Substance Use: Chemical use reviewed, no active concerns identified      Tobacco Use: No current tobacco use.      Diagnosis:  1. Adjustment disorder with mixed anxiety and depressed mood            Collateral Reports Completed:   Not Applicable    PLAN: (Patient Tasks / Therapist Tasks / Other)  Patient agreed to work on using assertive/direct/communication with his spouse to discuss differences and relationship wants.        Nagi Euceda, [facility] 5/13/24                                                         ______________________________________________________________________    Family with client present Treatment Plan    Patient's Name: Silviano Collins  YOB: 1975    Date of Creation: 9/25/23  Date Treatment Plan Last Reviewed/Revised: 5/13/24, next due 8/13/24.    DSM5 Diagnoses: Adjustment Disorders  309.28 (F43.23) With mixed anxiety  and depressed mood  Psychosocial / Contextual Factors: relationship conflict related to infidelity  PROMIS (reviewed every 90 days): 28 (1/27/24)    Referral / Collaboration:  Referral to another professional/service is not indicated at this time..    Anticipated number of session for this episode of care:  11-20  Anticipation frequency of session: Every other week  Anticipated Duration of each session:  38-53+  Treatment plan will be reviewed in 90 days or when goals have been changed.       MeasurableTreatment Goal(s) related to diagnosis / functional impairment(s)  Goal 1: Patient and spouse will improve interpersonal functioning/relationship by 2 points on a 1-10 Likert scale per self-report (10 = optimal interpersonal functioning).    I will know I've met my goal when my relationship have improved and stabilized, marriage intact.      Objective #A (Client Action)    Status: Continued - Date: 5/13/24     Patient will learn & utilize at least some assertive communication skills weekly.    Intervention(s)  Therapist will teach assertiveness skills. Nonviolent Communication and DBT Interpersonal Effectiveness skills..    Objective #B  Patient will practice setting boundaries some times in the next 12 weeks.    Status: Continued - Date: 5/13/24     Intervention(s)  Therapist will teach assertiveness skills. [place]-setting.    Objective #C  Patient and will spouse will engage in positive experiences with each other.  Status:  Continued - Date: 5/13/24    Intervention(s)  Therapist will teach strategies for connection/increasing intimacy.    Patient and significant other/spouse have reviewed and agreed to the above plan.      Nagi Euceda, LMFT  May 13, 2024

## 2024-05-14 ENCOUNTER — MYC REFILL (OUTPATIENT)
Dept: FAMILY MEDICINE | Facility: CLINIC | Age: 49
End: 2024-05-14

## 2024-05-14 ENCOUNTER — OFFICE VISIT (OUTPATIENT)
Dept: DERMATOLOGY | Facility: CLINIC | Age: 49
End: 2024-05-14
Payer: COMMERCIAL

## 2024-05-14 ENCOUNTER — MYC REFILL (OUTPATIENT)
Dept: EDUCATION SERVICES | Facility: CLINIC | Age: 49
End: 2024-05-14

## 2024-05-14 ENCOUNTER — MYC REFILL (OUTPATIENT)
Dept: ALLERGY | Facility: CLINIC | Age: 49
End: 2024-05-14

## 2024-05-14 DIAGNOSIS — H10.13 ALLERGIC CONJUNCTIVITIS, BILATERAL: ICD-10-CM

## 2024-05-14 DIAGNOSIS — E11.9 CONTROLLED TYPE 2 DIABETES MELLITUS WITHOUT COMPLICATION, WITHOUT LONG-TERM CURRENT USE OF INSULIN (H): ICD-10-CM

## 2024-05-14 DIAGNOSIS — L82.0 INFLAMED SEBORRHEIC KERATOSIS: ICD-10-CM

## 2024-05-14 DIAGNOSIS — L81.4 LENTIGO: ICD-10-CM

## 2024-05-14 DIAGNOSIS — J30.89 ALLERGIC RHINITIS DUE TO DUST MITE: ICD-10-CM

## 2024-05-14 DIAGNOSIS — J45.20 MILD INTERMITTENT ASTHMA, UNSPECIFIED WHETHER COMPLICATED: ICD-10-CM

## 2024-05-14 DIAGNOSIS — L82.1 SEBORRHEIC KERATOSIS: Primary | ICD-10-CM

## 2024-05-14 PROCEDURE — 99213 OFFICE O/P EST LOW 20 MIN: CPT | Mod: 25 | Performed by: PHYSICIAN ASSISTANT

## 2024-05-14 PROCEDURE — 17110 DESTRUCTION B9 LES UP TO 14: CPT | Performed by: PHYSICIAN ASSISTANT

## 2024-05-14 RX ORDER — AZELASTINE HYDROCHLORIDE 0.5 MG/ML
1 SOLUTION/ DROPS OPHTHALMIC 2 TIMES DAILY
Qty: 6 ML | Refills: 5 | Status: CANCELLED | OUTPATIENT
Start: 2024-05-14

## 2024-05-14 RX ORDER — MONTELUKAST SODIUM 10 MG/1
10 TABLET ORAL AT BEDTIME
Qty: 90 TABLET | Refills: 3 | OUTPATIENT
Start: 2024-05-14

## 2024-05-14 RX ORDER — TIRZEPATIDE 5 MG/.5ML
INJECTION, SOLUTION SUBCUTANEOUS
Qty: 2 ML | Refills: 1 | OUTPATIENT
Start: 2024-05-14

## 2024-05-14 ASSESSMENT — PAIN SCALES - GENERAL: PAINLEVEL: NO PAIN (0)

## 2024-05-14 NOTE — LETTER
5/14/2024         RE: Silviano Collins  [address] [facility] [address]        Dear Colleague,    Thank you for referring your patient, Silviano Collins, to the [facility]. Please see a copy of my visit note below.    Silviano Collins is an extremely pleasant 48 year old year old male patient here today for brown spots on face. He notes bothersome, he would like treated with cryo. Patient has no other skin complaints today.  Remainder of the HPI, Meds, PMH, Allergies, FH, and SH was reviewed in chart.    Pertinent Hx:   No personal history of skin cancer.   Past Medical History:   Diagnosis Date     Allergic rhinitis due to other allergen     seasonal allergies (spring and fall)     Mild intermittent asthma     exercise, seasonal, associated with URIs       Past Surgical History:   Procedure Laterality Date     HC TOOTH EXTRACTION W/FORCEP  1995    4 wisdom teeth removed withotu complications        Family History   Problem Relation Age of Onset     Diabetes Mother         D: 40 pneumonia,  Type I DM     Family History Negative Father      Family History Negative Brother        Social History     Socioeconomic History     Marital status:      Spouse name: Not on file     Number of children: Not on file     Years of education: Not on file     Highest education level: Not on file   Occupational History     Not on file   Tobacco Use     Smoking status: Former     Smokeless tobacco: Never   Vaping Use     Vaping status: Never Used   Substance and Sexual Activity     Alcohol use: Yes     Comment: 2x month     Drug use: Never     Sexual activity: Not Currently   Other Topics Concern     Not on file   Social History Narrative    09/28/23         ENVIRONMENTAL HISTORY: [place] is heated with a forced air. They does have central air conditioning. The patient's bedroom is furnished with feather/wool bedding or pillows  and carpet in the bedroom.  Pets inside the house include 2 dog(s). There is no history of cockroach or mice infestation. There is/are 0 smokers in the house.  The house does not have a damp basement.      Social Determinants of Health     Financial Resource Strain: Low Risk  (1/24/2024)    Financial Resource Strain      Within the past 12 months, have you or your family members you live with been unable to get utilities (heat, electricity) when it was really needed?: No   Food Insecurity: High Risk (1/24/2024)    Food Insecurity      Within the past 12 months, did you worry that your food would run out before you got money to buy more?: No      Within the past 12 months, did the food you bought just not last and you didn t have money to get more?: Yes   Transportation Needs: Low Risk  (1/24/2024)    Transportation Needs      Within the past 12 months, has lack of transportation kept you from medical appointments, getting your medicines, non-medical meetings or appointments, work, or from getting things that you need?: No   Physical Activity: Not on file   Stress: Not on file   Social Connections: Unknown (12/23/2021)    [facility]    Social Connections      Frequency of Communication with Friends and Family: Not on file   Interpersonal Safety: Low Risk  (4/24/2024)    Interpersonal Safety      Do you feel physically and emotionally safe where you currently live?: Yes      Within the past 12 months, have you been hit, slapped, kicked or otherwise physically hurt by someone?: No      Within the past 12 months, have you been humiliated or emotionally abused in other ways by your partner or ex-partner?: No   Housing Stability: Low Risk  (1/24/2024)    Housing Stability      Do you have housing? : Yes      Are you worried about losing your housing?: No       Outpatient Encounter Medications as of 5/14/2024   Medication Sig  Dispense Refill     albuterol (PROAIR HFA/PROVENTIL HFA/VENTOLIN HFA) 108 (90 Base) MCG/ACT inhaler Inhale 2 puffs into the lungs every 6 hours 36 g 3     alcohol swab prep pads Use to swab area of injection/dominick as directed. 100 each 0     azelastine (ASTELIN) 0.1 % nasal spray Spray 2 sprays into both nostrils 2 times daily 30 mL 11     azelastine (OPTIVAR) 0.05 % ophthalmic solution Place 1 drop into both eyes 2 times daily 6 mL 5     blood glucose (NO BRAND SPECIFIED) test strip Use to test blood sugar once daily or as directed. To accompany: Blood Glucose Monitor Brands: per insurance. 100 strip 0     blood glucose calibration (NO BRAND SPECIFIED) solution To accompany: Blood Glucose Monitor Brands: per insurance. 1 each 0     blood glucose monitoring (NO BRAND SPECIFIED) meter device kit Use to test blood sugar one time daily or as directed. Preferred blood glucose meter OR supplies to accompany: Blood Glucose Monitor Brands: per insurance. 1 kit 0     clobetasol (TEMOVATE) 0.05 % external ointment Apply sparingly to affected area two times daily as needed but not more than 14 days in a row. Spare face, armpits, neck, and groin. 60 g 3     Continuous Blood Gluc  (DEXCOM G6 ) EVA Use to read blood sugars as per 's instructions. 1 each 0     Continuous Blood Gluc Sensor (FREESTYLE EKATERINA 3 SENSOR) [facility] 1 each every 14 days 2 each 4     fluticasone (FLONASE) 50 MCG/ACT nasal spray Spray 2 sprays into both nostrils daily 16 g 11     fluticasone-salmeterol (ADVAIR DISKUS) 100-50 MCG/ACT inhaler Inhale 1 puff into the lungs every 12 hours 60 each 2     ketotifen fumarate 0.035% 0.035 % SOLN ophthalmic solution Place 1 drop into both eyes every 8 hours 5 mL 0     levocetirizine (XYZAL) 5 MG tablet Take 1 tablet (5 mg) by mouth 2 times daily 60 tablet 3     loratadine-pseudoePHEDrine (SM ALLERGY RELIEF D)  MG 24 hr tablet Take 1 tablet by mouth daily 30 tablet 1     metFORMIN  (GLUCOPHAGE XR) 500 MG 24 hr tablet TAKE TWO TABLETS BY MOUTH TWICE A DAY WITH MEALS 120 tablet 2     modafinil (PROVIGIL) 200 MG tablet Take 1 tablet (200 mg) by mouth daily Take 1 tab every morning for sleepiness due to KRISTEL 30 tablet 0     montelukast (SINGULAIR) 10 MG tablet Take 1 tablet (10 mg) by mouth at bedtime 90 tablet 3     MOUNJARO 5 MG/0.5ML pen INJECT 5 MG UNDER THE SKIN EVERY 7 DAYS 2 mL 1     MULTI-VITAMIN OR TABS        rosuvastatin (CRESTOR) 10 MG tablet Take 1 tablet (10 mg) by mouth daily 30 tablet 5     sertraline (ZOLOFT) 50 MG tablet Take 1 tablet (50 mg) by mouth daily as needed (sex) Take one tablet 8-12 hours before sex 30 tablet 1     sildenafil (VIAGRA) 25 MG tablet Take 1 tablet (25 mg) by mouth daily as needed 90 tablet 0     thin (NO BRAND SPECIFIED) lancets Use with lanceting device. To accompany: Blood Glucose Monitor Brands: per insurance. 100 each 0     triamcinolone (KENALOG) 0.1 % external ointment Apply topically 2 times daily 30 g 1     valACYclovir (VALTREX) 500 MG tablet TAKE 1 TABLET (500 MG) BY MOUTH 2 TIMES DAILY 30 tablet 0     No facility-administered encounter medications on file as of 5/14/2024.             O:   NAD, WDWN, Alert & Oriented, Mood & Affect wnl, Vitals stable   Here today alone   There were no vitals taken for this visit.   General appearance normal   Vitals stable   Alert, oriented and in no acute distress      Brown stuck on papules on face, brown macules on face      Eyes: Conjunctivae/lids:Normal     ENT: Lips: normal    MSK:Normal    Pulm: Breathing Normal    Neuro/Psych: Orientation:Alert and Orientedx3 ; Mood/Affect:normal     A/P:  1. Inflamed seborrheic keratosis right cheek, left brow, left superior forehead x2, left cheek x 2, and left chest x 1  LN2:  Treated with LN2 for 5s for 1-2 cycles. Warned risks of blistering, pain, pigment change, scarring, and incomplete resolution.  Advised patient to return if lesions do not completely resolve.   Wound care sheet given.  2. Seborrheic keratosis, lentigo  It was a pleasure speaking to Silviano MARV Collins today.  BENIGN LESIONS DISCUSSED WITH PATIENT:  I discussed the specifics of tumor, prognosis, and genetics of benign lesions.  I explained that treatment of these lesions would be purely cosmetic and not medically neccessary.  I discussed with patient different removal options including excision, cautery and /or laser.      Nature and genetics of benign skin lesions dicussed with patient.  Signs and Symptoms of skin cancer discussed with patient.  ABCDEs of melanoma reviewed with patient.  Patient encouraged to perform monthly skin exams.  UV precautions reviewed with patient.  Risks of non-melanoma skin cancer discussed with patient   Return to clinic as needed.       Again, thank you for allowing me to participate in the care of your patient.        Sincerely,        Katie Boo PA-C

## 2024-05-14 NOTE — PROGRESS NOTES
Silviano Collins is an extremely pleasant 48 year old year old male patient here today for brown spots on face. He notes bothersome, he would like treated with cryo. Patient has no other skin complaints today.  Remainder of the HPI, Meds, PMH, Allergies, FH, and SH was reviewed in chart.    Pertinent Hx:   No personal history of skin cancer.   Past Medical History:   Diagnosis Date    Allergic rhinitis due to other allergen     seasonal allergies (spring and fall)    Mild intermittent asthma     exercise, seasonal, associated with URIs       Past Surgical History:   Procedure Laterality Date    HC TOOTH EXTRACTION W/FORCEP  1995    4 wisdom teeth removed withotu complications        Family History   Problem Relation Age of Onset    Diabetes Mother         D: 40 pneumonia,  Type I DM    Family History Negative Father     Family History Negative Brother        Social History     Socioeconomic History    Marital status:      Spouse name: Not on file    Number of children: Not on file    Years of education: Not on file    Highest education level: Not on file   Occupational History    Not on file   Tobacco Use    Smoking status: Former    Smokeless tobacco: Never   Vaping Use    Vaping status: Never Used   Substance and Sexual Activity    Alcohol use: Yes     Comment: 2x month    Drug use: Never    Sexual activity: Not Currently   Other Topics Concern    Not on file   Social History Narrative    09/28/23         ENVIRONMENTAL HISTORY: [place] [facility] [place] The home is heated with a forced air. They does have central air conditioning. The patient's bedroom is furnished with feather/wool bedding or pillows and carpet in the bedroom.  Pets inside the house include 2 dog(s). There is no history of cockroach or mice infestation. There is/are 0 smokers in the house.  The house does not have a damp basement.      Social Determinants of Health     Financial Resource Strain: Low Risk   (1/24/2024)    Financial Resource Strain     Within the past 12 months, have you or your family members you live with been unable to get utilities (heat, electricity) when it was really needed?: No   Food Insecurity: High Risk (1/24/2024)    Food Insecurity     Within the past 12 months, did you worry that your food would run out before you got money to buy more?: No     Within the past 12 months, did the food you bought just not last and you didn t have money to get more?: Yes   Transportation Needs: Low Risk  (1/24/2024)    Transportation Needs     Within the past 12 months, has lack of transportation kept you from medical appointments, getting your medicines, non-medical meetings or appointments, work, or from getting things that you need?: No   Physical Activity: Not on file   Stress: Not on file   Social Connections: Unknown (12/23/2021)    Received from [facility], [facility]    Social Connections     Frequency of Communication with Friends and Family: Not on file   Interpersonal Safety: Low Risk  (4/24/2024)    Interpersonal Safety     Do you feel physically and emotionally safe where you currently live?: Yes     Within the past 12 months, have you been hit, slapped, kicked or otherwise physically hurt by someone?: No     Within the past 12 months, have you been humiliated or emotionally abused in other ways by your partner or ex-partner?: No   Housing Stability: Low Risk  (1/24/2024)    Housing Stability     Do you have housing? : Yes     Are you worried about losing your housing?: No       Outpatient Encounter Medications as of 5/14/2024   Medication Sig Dispense Refill    albuterol (PROAIR HFA/PROVENTIL HFA/VENTOLIN HFA) 108 (90 Base) MCG/ACT inhaler Inhale 2 puffs into the lungs every 6 hours 36 g 3    alcohol swab prep pads Use to swab area of injection/dominick as directed. 100 each 0    azelastine (ASTELIN) 0.1 % nasal spray Spray 2  sprays into both nostrils 2 times daily 30 mL 11    azelastine (OPTIVAR) 0.05 % ophthalmic solution Place 1 drop into both eyes 2 times daily 6 mL 5    blood glucose (NO BRAND SPECIFIED) test strip Use to test blood sugar once daily or as directed. To accompany: Blood Glucose Monitor Brands: per insurance. 100 strip 0    blood glucose calibration (NO BRAND SPECIFIED) solution To accompany: Blood Glucose Monitor Brands: per insurance. 1 each 0    blood glucose monitoring (NO BRAND SPECIFIED) meter device kit Use to test blood sugar one time daily or as directed. Preferred blood glucose meter OR supplies to accompany: Blood Glucose Monitor Brands: per insurance. 1 kit 0    clobetasol (TEMOVATE) 0.05 % external ointment Apply sparingly to affected area two times daily as needed but not more than 14 days in a row. Spare face, armpits, neck, and groin. 60 g 3    Continuous Blood Gluc  (DEXCOM G6 ) EVA Use to read blood sugars as per 's instructions. 1 each 0    Continuous Blood Gluc Sensor (FREESTYLE EKATERINA 3 SENSOR) [facility] 1 each every 14 days 2 each 4    fluticasone (FLONASE) 50 MCG/ACT nasal spray Spray 2 sprays into both nostrils daily 16 g 11    fluticasone-salmeterol (ADVAIR DISKUS) 100-50 MCG/ACT inhaler Inhale 1 puff into the lungs every 12 hours 60 each 2    ketotifen fumarate 0.035% 0.035 % SOLN ophthalmic solution Place 1 drop into both eyes every 8 hours 5 mL 0    levocetirizine (XYZAL) 5 MG tablet Take 1 tablet (5 mg) by mouth 2 times daily 60 tablet 3    loratadine-pseudoePHEDrine (SM ALLERGY RELIEF D)  MG 24 hr tablet Take 1 tablet by mouth daily 30 tablet 1    metFORMIN (GLUCOPHAGE XR) 500 MG 24 hr tablet TAKE TWO TABLETS BY MOUTH TWICE A DAY WITH MEALS 120 tablet 2    modafinil (PROVIGIL) 200 MG tablet Take 1 tablet (200 mg) by mouth daily Take 1 tab every morning for sleepiness due to KRISTEL 30 tablet 0    montelukast (SINGULAIR) 10 MG tablet Take 1 tablet (10 mg) by mouth  at bedtime 90 tablet 3    MOUNJARO 5 MG/0.5ML pen INJECT 5 MG UNDER THE SKIN EVERY 7 DAYS 2 mL 1    MULTI-VITAMIN OR TABS       rosuvastatin (CRESTOR) 10 MG tablet Take 1 tablet (10 mg) by mouth daily 30 tablet 5    sertraline (ZOLOFT) 50 MG tablet Take 1 tablet (50 mg) by mouth daily as needed (sex) Take one tablet 8-12 hours before sex 30 tablet 1    sildenafil (VIAGRA) 25 MG tablet Take 1 tablet (25 mg) by mouth daily as needed 90 tablet 0    thin (NO BRAND SPECIFIED) lancets Use with lanceting device. To accompany: Blood Glucose Monitor Brands: per insurance. 100 each 0    triamcinolone (KENALOG) 0.1 % external ointment Apply topically 2 times daily 30 g 1    valACYclovir (VALTREX) 500 MG tablet TAKE 1 TABLET (500 MG) BY MOUTH 2 TIMES DAILY 30 tablet 0     No facility-administered encounter medications on file as of 5/14/2024.             O:   NAD, WDWN, Alert & Oriented, Mood & Affect wnl, Vitals stable   Here today alone   There were no vitals taken for this visit.   General appearance normal   Vitals stable   Alert, oriented and in no acute distress      Brown stuck on papules on face, brown macules on face      Eyes: Conjunctivae/lids:Normal     ENT: Lips: normal    MSK:Normal    Pulm: Breathing Normal    Neuro/Psych: Orientation:Alert and Orientedx3 ; Mood/Affect:normal     A/P:  1. Inflamed seborrheic keratosis right cheek, left brow, left superior forehead x2, left cheek x 2, and left chest x 1  LN2:  Treated with LN2 for 5s for 1-2 cycles. Warned risks of blistering, pain, pigment change, scarring, and incomplete resolution.  Advised patient to return if lesions do not completely resolve.  Wound care sheet given.  2. Seborrheic keratosis, lentigo  It was a pleasure speaking to Silviano Collins today.  BENIGN LESIONS DISCUSSED WITH PATIENT:  I discussed the specifics of tumor, prognosis, and genetics of benign lesions.  I explained that treatment of these lesions would be purely cosmetic and not  medically neccessary.  I discussed with patient different removal options including excision, cautery and /or laser.      Nature and genetics of benign skin lesions dicussed with patient.  Signs and Symptoms of skin cancer discussed with patient.  ABCDEs of melanoma reviewed with patient.  Patient encouraged to perform monthly skin exams.  UV precautions reviewed with patient.  Risks of non-melanoma skin cancer discussed with patient   Return to clinic as needed.

## 2024-05-15 RX ORDER — BLOOD-GLUCOSE SENSOR
1 EACH MISCELLANEOUS
Qty: 2 EACH | Refills: 4 | Status: SHIPPED | OUTPATIENT
Start: 2024-05-15 | End: 2024-08-09

## 2024-05-22 DIAGNOSIS — J45.30 MILD PERSISTENT ASTHMA WITHOUT COMPLICATION: ICD-10-CM

## 2024-05-22 RX ORDER — FLUTICASONE PROPIONATE AND SALMETEROL 100; 50 UG/1; UG/1
1 POWDER RESPIRATORY (INHALATION) EVERY 12 HOURS
Qty: 60 EACH | Refills: 1 | Status: SHIPPED | OUTPATIENT
Start: 2024-05-22 | End: 2024-07-11

## 2024-05-22 NOTE — TELEPHONE ENCOUNTER
Pending Prescriptions:                       Disp   Refills    fluticasone-salmeterol (ADVAIR DISKUS) 10*60 each2            Sig: Inhale 1 puff into the lungs every 12 hours

## 2024-05-31 ENCOUNTER — DOCUMENTATION ONLY (OUTPATIENT)
Dept: ALLERGY | Facility: CLINIC | Age: 49
End: 2024-05-31

## 2024-06-04 ENCOUNTER — VIRTUAL VISIT (OUTPATIENT)
Dept: PSYCHOLOGY | Facility: CLINIC | Age: 49
End: 2024-06-04
Payer: COMMERCIAL

## 2024-06-04 DIAGNOSIS — F43.23 ADJUSTMENT DISORDER WITH MIXED ANXIETY AND DEPRESSED MOOD: Primary | ICD-10-CM

## 2024-06-04 PROCEDURE — 90847 FAMILY PSYTX W/PT 50 MIN: CPT | Mod: 95 | Performed by: MARRIAGE & FAMILY THERAPIST

## 2024-06-04 ASSESSMENT — ANXIETY QUESTIONNAIRES
8. IF YOU CHECKED OFF ANY PROBLEMS, HOW DIFFICULT HAVE THESE MADE IT FOR YOU TO DO YOUR WORK, TAKE CARE OF THINGS AT HOME, OR GET ALONG WITH OTHER PEOPLE?: NOT DIFFICULT AT ALL
2. NOT BEING ABLE TO STOP OR CONTROL WORRYING: NOT AT ALL
GAD7 TOTAL SCORE: 5
GAD7 TOTAL SCORE: 5
3. WORRYING TOO MUCH ABOUT DIFFERENT THINGS: SEVERAL DAYS
6. BECOMING EASILY ANNOYED OR IRRITABLE: SEVERAL DAYS
1. FEELING NERVOUS, ANXIOUS, OR ON EDGE: SEVERAL DAYS
GAD7 TOTAL SCORE: 5
4. TROUBLE RELAXING: SEVERAL DAYS
7. FEELING AFRAID AS IF SOMETHING AWFUL MIGHT HAPPEN: NOT AT ALL
IF YOU CHECKED OFF ANY PROBLEMS ON THIS QUESTIONNAIRE, HOW DIFFICULT HAVE THESE PROBLEMS MADE IT FOR YOU TO DO YOUR WORK, TAKE CARE OF THINGS AT HOME, OR GET ALONG WITH OTHER PEOPLE: NOT DIFFICULT AT ALL
7. FEELING AFRAID AS IF SOMETHING AWFUL MIGHT HAPPEN: NOT AT ALL
5. BEING SO RESTLESS THAT IT IS HARD TO SIT STILL: SEVERAL DAYS

## 2024-06-04 ASSESSMENT — PATIENT HEALTH QUESTIONNAIRE - PHQ9
SUM OF ALL RESPONSES TO PHQ QUESTIONS 1-9: 3
10. IF YOU CHECKED OFF ANY PROBLEMS, HOW DIFFICULT HAVE THESE PROBLEMS MADE IT FOR YOU TO DO YOUR WORK, TAKE CARE OF THINGS AT HOME, OR GET ALONG WITH OTHER PEOPLE: NOT DIFFICULT AT ALL
SUM OF ALL RESPONSES TO PHQ QUESTIONS 1-9: 3

## 2024-06-04 NOTE — PROGRESS NOTES
[facility]                                     Progress Note    Patient Name: Silviano Collins  Date: 6/4/24         Service Type: Family with client present      Session Start Time: 3:00  Session End Time: 3:56     Session Length: 56    Session #: 6    Attendees: Client and Spouse / Significant Other    Service Modality:  Video Visit:      Telemedicine Visit: The patient's condition can be safely assessed and treated via synchronous audio and visual telemedicine encounter.      Reason for Telemedicine Visit: Services only offered telehealth    Originating Site (Patient Location): Patient's other vehicle      Distant Location (provider location):  Off-site    Consent:  The patient/guardian has verbally consented to: the potential risks and benefits of telemedicine (video visit) versus in person care; bill my insurance or make self-payment for services provided; and responsibility for payment of non-covered services.     Mode of Communication:  Video Conference via [employer]    As the provider I attest to compliance with applicable laws and regulations related to telemedicine.     DATA  Interactive Complexity: No  Crisis: No        Progress Since Last Session (Related to Symptoms / Goals / Homework):   Symptoms: Worsening increased PHQ and SOLIS scores.    Homework: Partially completed      Episode of Care Goals: Minimal progress - ACTION (Actively working towards change); Intervened by reinforcing change plan / affirming steps taken     Current / Ongoing Stressors and Concerns:   Patient and spouse reported regarding ongoing interpersonal issues in their marriage.  Patient reported regarding a lack of physical/sexual intimacy, and wanting to work on changing this.  Patient's spouse identified need/want for improved emotional connection/intimacy, and being unsure of her relationship needs.     Treatment Objective(s) Addressed in This Session:   learn & utilize at least some assertive  "communication skills weekly     Intervention:   Interpersonal Therapy: re-reviewed with patient and spouse identifying their relationship needs/wants, using assertive/direct/communication with each other to discuss these, and looking into \"5 Love Languages\" book.    Assessments completed prior to visit:  The following assessments were completed by patient for this visit:  PHQ9:       9/25/2023    11:57 AM 11/10/2023     9:22 AM 11/21/2023     8:05 AM 12/12/2023     1:51 PM 1/30/2024     9:55 AM 5/12/2024     8:15 PM 6/4/2024     2:34 PM   PHQ-9 SCORE   PHQ-9 Total Score MyChart 0 0 0 0 0 1 (Minimal depression) 3 (Minimal depression)   PHQ-9 Total Score 0 0 0 0 0 1 3     GAD7:       9/25/2023    11:58 AM 11/10/2023     9:23 AM 12/12/2023     1:51 PM 1/30/2024     9:56 AM 5/12/2024     8:16 PM 6/4/2024     2:35 PM   SOLIS-7 SCORE   Total Score 0 (minimal anxiety) 0 (minimal anxiety) 0 (minimal anxiety) 0 (minimal anxiety) 3 (minimal anxiety) 5 (mild anxiety)   Total Score 0 0 0 0 3 5         ASSESSMENT: Current Emotional / Mental Status (status of significant symptoms):   Risk status (Self / Other harm or suicidal ideation)   Patient denies current fears or concerns for personal safety.   Patient denies current or recent suicidal ideation or behaviors.   Patient denies current or recent homicidal ideation or behaviors.   Patient denies current or recent self injurious behavior or ideation.   Patient denies other safety concerns.   Patient reports there has been no change in risk factors since their last session.     Patient reports there has been no change in protective factors since their last session.     Recommended that patient call 911 or go to the local ED should there be a change in any of these risk factors.     Appearance:   Appropriate    Eye Contact:   Good    Psychomotor Behavior: Normal    Attitude:   Cooperative  Interested Friendly Pleasant Attentive   Orientation:   All   Speech    Rate / " "Production: Normal/ Responsive Talkative    Volume:  Normal    Mood:    Anxious  Normal   Affect:    Appropriate  Subdued  Worrisome    Thought Content:  Clear  Rumination    Thought Form:  Coherent  Goal Directed  Logical    Insight:    Fair  and Intellectual Insight     Medication Review:   No changes to current psychiatric medication(s)     Medication Compliance:   Yes     Changes in Health Issues:   None reported     Chemical Use Review:   Substance Use: Chemical use reviewed, no active concerns identified      Tobacco Use: No current tobacco use.      Diagnosis:  1. Adjustment disorder with mixed anxiety and depressed mood              Collateral Reports Completed:   Not Applicable    PLAN: (Patient Tasks / Therapist Tasks / Other)  Patient and spouse agreed to work on identifying their relationship needs/wants, using assertive/direct/communication with each other to discuss these, and looking into \"5 Love Languages\" book.        Nagi Euceda, [facility] 6/4/24                                                         ______________________________________________________________________    Family with client present Treatment Plan    Patient's Name: Silviano Collins  YOB: 1975    Date of Creation: 9/25/23  Date Treatment Plan Last Reviewed/Revised: 5/13/24, next due 8/13/24.    DSM5 Diagnoses: Adjustment Disorders  309.28 (F43.23) With mixed anxiety and depressed mood  Psychosocial / Contextual Factors: relationship conflict related to infidelity  PROMIS (reviewed every 90 days): 28 (1/27/24)    Referral / Collaboration:  Referral to another professional/service is not indicated at this time..    Anticipated number of session for this episode of care:  11-20  Anticipation frequency of session: Every other week  Anticipated Duration of each session:  38-53+  Treatment plan will be reviewed in 90 days or when goals have been changed.       MeasurableTreatment Goal(s) related to diagnosis / " functional impairment(s)  Goal 1: Patient and spouse will improve interpersonal functioning/relationship by 2 points on a 1-10 Likert scale per self-report (10 = optimal interpersonal functioning).    I will know I've met my goal when my relationship have improved and stabilized, marriage intact.      Objective #A (Client Action)    Status: Continued - Date: 5/13/24     Patient will learn & utilize at least some assertive communication skills weekly.    Intervention(s)  Therapist will teach assertiveness skills. Nonviolent Communication and DBT Interpersonal Effectiveness skills..    Objective #B  Patient will practice setting boundaries some times in the next 12 weeks.    Status: Continued - Date: 5/13/24     Intervention(s)  Therapist will teach assertiveness skills. [place]-setting.    Objective #C  Patient and will spouse will engage in positive experiences with each other.  Status:  Continued - Date: 5/13/24    Intervention(s)  Therapist will teach strategies for connection/increasing intimacy.    Patient and significant other/spouse have reviewed and agreed to the above plan.      Nagi Euceda, LMFT  May 13, 2024

## 2024-06-23 ENCOUNTER — MYC REFILL (OUTPATIENT)
Dept: ALLERGY | Facility: CLINIC | Age: 49
End: 2024-06-23
Payer: COMMERCIAL

## 2024-06-23 ENCOUNTER — MYC REFILL (OUTPATIENT)
Dept: FAMILY MEDICINE | Facility: CLINIC | Age: 49
End: 2024-06-23
Payer: COMMERCIAL

## 2024-06-23 DIAGNOSIS — H10.13 ALLERGIC CONJUNCTIVITIS, BILATERAL: ICD-10-CM

## 2024-06-23 DIAGNOSIS — E11.9 CONTROLLED TYPE 2 DIABETES MELLITUS WITHOUT COMPLICATION, WITHOUT LONG-TERM CURRENT USE OF INSULIN (H): ICD-10-CM

## 2024-06-23 DIAGNOSIS — J45.20 MILD INTERMITTENT ASTHMA, UNSPECIFIED WHETHER COMPLICATED: ICD-10-CM

## 2024-06-23 DIAGNOSIS — J30.89 ALLERGIC RHINITIS DUE TO DUST MITE: ICD-10-CM

## 2024-06-24 RX ORDER — TIRZEPATIDE 5 MG/.5ML
INJECTION, SOLUTION SUBCUTANEOUS
Qty: 2 ML | Refills: 1 | Status: SHIPPED | OUTPATIENT
Start: 2024-06-24 | End: 2024-09-06

## 2024-06-24 RX ORDER — LEVOCETIRIZINE DIHYDROCHLORIDE 5 MG/1
5 TABLET, FILM COATED ORAL 2 TIMES DAILY
Qty: 60 TABLET | Refills: 3 | Status: SHIPPED | OUTPATIENT
Start: 2024-06-24 | End: 2024-06-28

## 2024-06-25 ENCOUNTER — TELEPHONE (OUTPATIENT)
Dept: FAMILY MEDICINE | Facility: CLINIC | Age: 49
End: 2024-06-25
Payer: COMMERCIAL

## 2024-06-25 NOTE — TELEPHONE ENCOUNTER
Patient calling to ask why his PCP has not responded to his [employer] messages.    His PCP is no longer at [place] but has moved to the [facility].    Transferred him to that location to speak with the nurse line.    Christine M Klisch, RN

## 2024-06-25 NOTE — TELEPHONE ENCOUNTER
S-(situation): Patient has wound on foot under toes.  Sent picture on previous note.  Is wanting advice on how to treat at home.    B-(background): Patient DM2 and is managing blood sugars with oral agents and has been on mounjaro and has lost weight and is eating better.      A-(assessment): Patient's wound does not appear infected but has been present for a few days.  Patient is wanting to treat with ointments at home.  Blood sugars are managed well in range during the day and does not take metformin twice daily, but only once.  Being on Mounjaro also helps as he is eating healthier.     R-(recommendations): Recommended to use an antibiotic ointment, cleanse well, dry well and cover with dressing/band aid and change twice daily. Advised on s/s of infection to watch for and when to call clinic.  Patient verbalized understanding.     MICHELLE Mir RN  [facility]

## 2024-06-28 ENCOUNTER — MYC REFILL (OUTPATIENT)
Dept: ALLERGY | Facility: CLINIC | Age: 49
End: 2024-06-28
Payer: COMMERCIAL

## 2024-06-28 ENCOUNTER — MYC REFILL (OUTPATIENT)
Dept: FAMILY MEDICINE | Facility: CLINIC | Age: 49
End: 2024-06-28
Payer: COMMERCIAL

## 2024-06-28 DIAGNOSIS — H10.13 ALLERGIC CONJUNCTIVITIS, BILATERAL: ICD-10-CM

## 2024-06-28 DIAGNOSIS — J30.89 ALLERGIC RHINITIS DUE TO DUST MITE: ICD-10-CM

## 2024-06-28 DIAGNOSIS — E11.9 TYPE 2 DIABETES MELLITUS WITHOUT COMPLICATION, WITHOUT LONG-TERM CURRENT USE OF INSULIN (H): ICD-10-CM

## 2024-06-28 DIAGNOSIS — E11.9 CONTROLLED TYPE 2 DIABETES MELLITUS WITHOUT COMPLICATION, WITHOUT LONG-TERM CURRENT USE OF INSULIN (H): ICD-10-CM

## 2024-06-28 DIAGNOSIS — J45.20 MILD INTERMITTENT ASTHMA, UNSPECIFIED WHETHER COMPLICATED: ICD-10-CM

## 2024-06-28 RX ORDER — METFORMIN HCL 500 MG
1000 TABLET, EXTENDED RELEASE 24 HR ORAL 2 TIMES DAILY WITH MEALS
Qty: 360 TABLET | Refills: 0 | Status: SHIPPED | OUTPATIENT
Start: 2024-06-28 | End: 2024-09-06

## 2024-06-28 RX ORDER — TIRZEPATIDE 5 MG/.5ML
INJECTION, SOLUTION SUBCUTANEOUS
Qty: 2 ML | Refills: 1 | OUTPATIENT
Start: 2024-06-28

## 2024-06-28 RX ORDER — KETOTIFEN FUMARATE 0.35 MG/ML
1 SOLUTION/ DROPS OPHTHALMIC EVERY 12 HOURS
Qty: 5 ML | Refills: 0 | Status: SHIPPED | OUTPATIENT
Start: 2024-06-28 | End: 2024-08-09

## 2024-07-01 RX ORDER — LEVOCETIRIZINE DIHYDROCHLORIDE 5 MG/1
5 TABLET, FILM COATED ORAL 2 TIMES DAILY
Qty: 60 TABLET | Refills: 3 | Status: SHIPPED | OUTPATIENT
Start: 2024-07-01

## 2024-07-11 ENCOUNTER — MYC REFILL (OUTPATIENT)
Dept: FAMILY MEDICINE | Facility: CLINIC | Age: 49
End: 2024-07-11
Payer: COMMERCIAL

## 2024-07-11 DIAGNOSIS — J30.89 ALLERGIC RHINITIS DUE TO DUST MITE: ICD-10-CM

## 2024-07-11 DIAGNOSIS — J45.30 MILD PERSISTENT ASTHMA WITHOUT COMPLICATION: ICD-10-CM

## 2024-07-11 RX ORDER — FLUTICASONE PROPIONATE 50 MCG
2 SPRAY, SUSPENSION (ML) NASAL DAILY
Qty: 16 G | Refills: 11 | OUTPATIENT
Start: 2024-07-11

## 2024-07-11 RX ORDER — FLUTICASONE PROPIONATE AND SALMETEROL 100; 50 UG/1; UG/1
1 POWDER RESPIRATORY (INHALATION) EVERY 12 HOURS
Qty: 60 EACH | Refills: 1 | Status: SHIPPED | OUTPATIENT
Start: 2024-07-11 | End: 2024-07-16

## 2024-07-11 RX ORDER — FLUTICASONE PROPIONATE AND SALMETEROL 100; 50 UG/1; UG/1
1 POWDER RESPIRATORY (INHALATION) EVERY 12 HOURS
Qty: 60 EACH | Refills: 1 | OUTPATIENT
Start: 2024-07-11

## 2024-07-15 ASSESSMENT — ASTHMA QUESTIONNAIRES
ACT_TOTALSCORE: 9
QUESTION_1 LAST FOUR WEEKS HOW MUCH OF THE TIME DID YOUR ASTHMA KEEP YOU FROM GETTING AS MUCH DONE AT WORK, SCHOOL OR AT HOME: SOME OF THE TIME
QUESTION_2 LAST FOUR WEEKS HOW OFTEN HAVE YOU HAD SHORTNESS OF BREATH: MORE THAN ONCE A DAY
QUESTION_3 LAST FOUR WEEKS HOW OFTEN DID YOUR ASTHMA SYMPTOMS (WHEEZING, COUGHING, SHORTNESS OF BREATH, CHEST TIGHTNESS OR PAIN) WAKE YOU UP AT NIGHT OR EARLIER THAN USUAL IN THE MORNING: TWO OR THREE NIGHTS A WEEK
QUESTION_5 LAST FOUR WEEKS HOW WOULD YOU RATE YOUR ASTHMA CONTROL: POORLY CONTROLLED
QUESTION_4 LAST FOUR WEEKS HOW OFTEN HAVE YOU USED YOUR RESCUE INHALER OR NEBULIZER MEDICATION (SUCH AS ALBUTEROL): THREE OR MORE TIMES PER DAY
ACT_TOTALSCORE: 9

## 2024-07-16 ENCOUNTER — OFFICE VISIT (OUTPATIENT)
Dept: FAMILY MEDICINE | Facility: CLINIC | Age: 49
End: 2024-07-16
Payer: COMMERCIAL

## 2024-07-16 VITALS
OXYGEN SATURATION: 96 % | RESPIRATION RATE: 17 BRPM | TEMPERATURE: 98.4 F | HEART RATE: 92 BPM | WEIGHT: 204 LBS | BODY MASS INDEX: 28.56 KG/M2 | DIASTOLIC BLOOD PRESSURE: 70 MMHG | HEIGHT: 71 IN | SYSTOLIC BLOOD PRESSURE: 120 MMHG

## 2024-07-16 DIAGNOSIS — L30.1 DYSHIDROTIC ECZEMA: ICD-10-CM

## 2024-07-16 DIAGNOSIS — Z12.11 SCREEN FOR COLON CANCER: ICD-10-CM

## 2024-07-16 DIAGNOSIS — J45.31 MILD PERSISTENT ASTHMA WITH EXACERBATION: ICD-10-CM

## 2024-07-16 DIAGNOSIS — E11.9 CONTROLLED TYPE 2 DIABETES MELLITUS WITHOUT COMPLICATION, WITHOUT LONG-TERM CURRENT USE OF INSULIN (H): ICD-10-CM

## 2024-07-16 DIAGNOSIS — J45.31 MILD PERSISTENT ASTHMA WITH ACUTE EXACERBATION: Primary | ICD-10-CM

## 2024-07-16 PROCEDURE — 99214 OFFICE O/P EST MOD 30 MIN: CPT | Performed by: FAMILY MEDICINE

## 2024-07-16 RX ORDER — FLUTICASONE PROPIONATE AND SALMETEROL 250; 50 UG/1; UG/1
1 POWDER RESPIRATORY (INHALATION) EVERY 12 HOURS
Qty: 60 EACH | Refills: 5 | Status: SHIPPED | OUTPATIENT
Start: 2024-07-16

## 2024-07-16 ASSESSMENT — PAIN SCALES - GENERAL: PAINLEVEL: NO PAIN (0)

## 2024-07-18 DIAGNOSIS — J45.31 MILD PERSISTENT ASTHMA WITH ACUTE EXACERBATION: Primary | ICD-10-CM

## 2024-07-18 RX ORDER — PREDNISONE 20 MG/1
40 TABLET ORAL DAILY
Qty: 10 TABLET | Refills: 0 | Status: SHIPPED | OUTPATIENT
Start: 2024-07-18 | End: 2024-07-23

## 2024-08-06 ENCOUNTER — VIRTUAL VISIT (OUTPATIENT)
Dept: PSYCHOLOGY | Facility: CLINIC | Age: 49
End: 2024-08-06
Payer: COMMERCIAL

## 2024-08-06 DIAGNOSIS — F43.23 ADJUSTMENT DISORDER WITH MIXED ANXIETY AND DEPRESSED MOOD: Primary | ICD-10-CM

## 2024-08-06 PROCEDURE — 90847 FAMILY PSYTX W/PT 50 MIN: CPT | Mod: 95 | Performed by: MARRIAGE & FAMILY THERAPIST

## 2024-08-06 ASSESSMENT — ANXIETY QUESTIONNAIRES
7. FEELING AFRAID AS IF SOMETHING AWFUL MIGHT HAPPEN: NOT AT ALL
8. IF YOU CHECKED OFF ANY PROBLEMS, HOW DIFFICULT HAVE THESE MADE IT FOR YOU TO DO YOUR WORK, TAKE CARE OF THINGS AT HOME, OR GET ALONG WITH OTHER PEOPLE?: NOT DIFFICULT AT ALL
5. BEING SO RESTLESS THAT IT IS HARD TO SIT STILL: NOT AT ALL
GAD7 TOTAL SCORE: 0
IF YOU CHECKED OFF ANY PROBLEMS ON THIS QUESTIONNAIRE, HOW DIFFICULT HAVE THESE PROBLEMS MADE IT FOR YOU TO DO YOUR WORK, TAKE CARE OF THINGS AT HOME, OR GET ALONG WITH OTHER PEOPLE: NOT DIFFICULT AT ALL
4. TROUBLE RELAXING: NOT AT ALL
3. WORRYING TOO MUCH ABOUT DIFFERENT THINGS: NOT AT ALL
2. NOT BEING ABLE TO STOP OR CONTROL WORRYING: NOT AT ALL
6. BECOMING EASILY ANNOYED OR IRRITABLE: NOT AT ALL
7. FEELING AFRAID AS IF SOMETHING AWFUL MIGHT HAPPEN: NOT AT ALL
1. FEELING NERVOUS, ANXIOUS, OR ON EDGE: NOT AT ALL

## 2024-08-06 ASSESSMENT — PATIENT HEALTH QUESTIONNAIRE - PHQ9
SUM OF ALL RESPONSES TO PHQ QUESTIONS 1-9: 2
10. IF YOU CHECKED OFF ANY PROBLEMS, HOW DIFFICULT HAVE THESE PROBLEMS MADE IT FOR YOU TO DO YOUR WORK, TAKE CARE OF THINGS AT HOME, OR GET ALONG WITH OTHER PEOPLE: NOT DIFFICULT AT ALL
SUM OF ALL RESPONSES TO PHQ QUESTIONS 1-9: 2

## 2024-08-06 NOTE — PROGRESS NOTES
[facility]                                     Progress Note    Patient Name: Silviano Collins  Date: 8/6/24         Service Type: Family with client present      Session Start Time: 3:00  Session End Time: 3:42     Session Length: 42    Session #: 7    Attendees: Client and Spouse / Significant Other    Service Modality:  Video Visit:      Telemedicine Visit: The patient's condition can be safely assessed and treated via synchronous audio and visual telemedicine encounter.      Reason for Telemedicine Visit: Services only offered telehealth    Originating Site (Patient Location): Patient's other vehicle      Distant Location (provider location):  Off-site    Consent:  The patient/guardian has verbally consented to: the potential risks and benefits of telemedicine (video visit) versus in person care; bill my insurance or make self-payment for services provided; and responsibility for payment of non-covered services.     Mode of Communication:  Video Conference via [employer]    As the provider I attest to compliance with applicable laws and regulations related to telemedicine.     DATA  Interactive Complexity: No  Crisis: No        Progress Since Last Session (Related to Symptoms / Goals / Homework):   Symptoms: Improving decreased PHQ and SOLIS scores.    Homework: Partially completed      Episode of Care Goals: Minimal progress - ACTION (Actively working towards change); Intervened by reinforcing change plan / affirming steps taken     Current / Ongoing Stressors and Concerns:   Patient and spouse reported regarding ongoing interpersonal issues in their marriage.  Patient again reported regarding a lack of physical/sexual intimacy, and being unsure of his primary love language(s).  Patient's spouse identified her primary love language as Words of Affirmation, which was helpful to patient, who thought that hers was Quality Time.     Treatment Objective(s) Addressed in This Session:   learn  "& utilize at least some assertive communication skills weekly     Intervention:   Interpersonal Therapy: re-reviewed with patient and spouse identifying their relationship needs/wants, using assertive/direct/communication with each other to discuss these, completing reading \"5 Love Languages\" book, and looking for self-assessment of love languages.    Assessments completed prior to visit:  The following assessments were completed by patient for this visit:  PHQ9:       11/10/2023     9:22 AM 11/21/2023     8:05 AM 12/12/2023     1:51 PM 1/30/2024     9:55 AM 5/12/2024     8:15 PM 6/4/2024     2:34 PM 8/6/2024     1:44 PM   PHQ-9 SCORE   PHQ-9 Total Score MyChart 0 0 0 0 1 (Minimal depression) 3 (Minimal depression) 2 (Minimal depression)   PHQ-9 Total Score 0 0 0 0 1 3 2     GAD7:       9/25/2023    11:58 AM 11/10/2023     9:23 AM 12/12/2023     1:51 PM 1/30/2024     9:56 AM 5/12/2024     8:16 PM 6/4/2024     2:35 PM 8/6/2024     1:45 PM   SOLIS-7 SCORE   Total Score 0 (minimal anxiety) 0 (minimal anxiety) 0 (minimal anxiety) 0 (minimal anxiety) 3 (minimal anxiety) 5 (mild anxiety) 0 (minimal anxiety)   Total Score 0 0 0 0 3 5 0         ASSESSMENT: Current Emotional / Mental Status (status of significant symptoms):   Risk status (Self / Other harm or suicidal ideation)   Patient denies current fears or concerns for personal safety.   Patient denies current or recent suicidal ideation or behaviors.   Patient denies current or recent homicidal ideation or behaviors.   Patient denies current or recent self injurious behavior or ideation.   Patient denies other safety concerns.   Patient reports there has been no change in risk factors since their last session.     Patient reports there has been no change in protective factors since their last session.     Recommended that patient call 911 or go to the local ED should there be a change in any of these risk factors.     Appearance:   Appropriate    Eye Contact:   Good " "   Psychomotor Behavior: Normal    Attitude:   Cooperative  Interested Friendly Pleasant Attentive   Orientation:   All   Speech    Rate / Production: Normal/ Responsive Talkative    Volume:  Normal    Mood:    Anxious  Normal   Affect:    Appropriate  Subdued  Worrisome    Thought Content:  Clear  Rumination    Thought Form:  Coherent  Goal Directed  Logical    Insight:    Good  and Intellectual Insight     Medication Review:   No changes to current psychiatric medication(s)     Medication Compliance:   Yes     Changes in Health Issues:   None reported     Chemical Use Review:   Substance Use: Chemical use reviewed, no active concerns identified      Tobacco Use: No current tobacco use.      Diagnosis:  1. Adjustment disorder with mixed anxiety and depressed mood              Collateral Reports Completed:   Not Applicable    PLAN: (Patient Tasks / Therapist Tasks / Other)  Patient and spouse agreed to continue to work on identifying their relationship needs/wants, using assertive/direct/communication with each other to discuss these, completing reading \"5 Love Languages\" book, and looking for self-assessment of love languages.        Nagi Euceda, [facility] 8/6/24                                                         ______________________________________________________________________    Family with client present Treatment Plan    Patient's Name: Silviano Collins  YOB: 1975    Date of Creation: 9/25/23  Date Treatment Plan Last Reviewed/Revised: 5/13/24, next due 8/13/24.    DSM5 Diagnoses: Adjustment Disorders  309.28 (F43.23) With mixed anxiety and depressed mood  Psychosocial / Contextual Factors: relationship conflict related to infidelity  PROMIS (reviewed every 90 days): 28 (1/27/24)    Referral / Collaboration:  Referral to another professional/service is not indicated at this time..    Anticipated number of session for this episode of care:  11-20  Anticipation frequency of " session: Every other week  Anticipated Duration of each session:  38-53+  Treatment plan will be reviewed in 90 days or when goals have been changed.       MeasurableTreatment Goal(s) related to diagnosis / functional impairment(s)  Goal 1: Patient and spouse will improve interpersonal functioning/relationship by 2 points on a 1-10 Likert scale per self-report (10 = optimal interpersonal functioning).    I will know I've met my goal when my relationship have improved and stabilized, marriage intact.      Objective #A (Client Action)    Status: Continued - Date: 5/13/24     Patient will learn & utilize at least some assertive communication skills weekly.    Intervention(s)  Therapist will teach assertiveness skills. Nonviolent Communication and DBT Interpersonal Effectiveness skills..    Objective #B  Patient will practice setting boundaries some times in the next 12 weeks.    Status: Continued - Date: 5/13/24     Intervention(s)  Therapist will teach assertiveness skills. [place]-setting.    Objective #C  Patient and will spouse will engage in positive experiences with each other.  Status:  Continued - Date: 5/13/24    Intervention(s)  Therapist will teach strategies for connection/increasing intimacy.    Patient and significant other/spouse have reviewed and agreed to the above plan.      Nagi Euceda, LMFT  May 13, 2024

## 2024-08-09 ENCOUNTER — MYC REFILL (OUTPATIENT)
Dept: FAMILY MEDICINE | Facility: CLINIC | Age: 49
End: 2024-08-09
Payer: COMMERCIAL

## 2024-08-09 ENCOUNTER — MYC REFILL (OUTPATIENT)
Dept: ALLERGY | Facility: CLINIC | Age: 49
End: 2024-08-09
Payer: COMMERCIAL

## 2024-08-09 ENCOUNTER — MYC REFILL (OUTPATIENT)
Dept: EDUCATION SERVICES | Facility: CLINIC | Age: 49
End: 2024-08-09
Payer: COMMERCIAL

## 2024-08-09 DIAGNOSIS — J45.20 MILD INTERMITTENT ASTHMA, UNSPECIFIED WHETHER COMPLICATED: ICD-10-CM

## 2024-08-09 DIAGNOSIS — E11.9 CONTROLLED TYPE 2 DIABETES MELLITUS WITHOUT COMPLICATION, WITHOUT LONG-TERM CURRENT USE OF INSULIN (H): ICD-10-CM

## 2024-08-09 DIAGNOSIS — E78.5 HYPERLIPIDEMIA, UNSPECIFIED HYPERLIPIDEMIA TYPE: ICD-10-CM

## 2024-08-09 DIAGNOSIS — J30.89 ALLERGIC RHINITIS DUE TO DUST MITE: ICD-10-CM

## 2024-08-09 DIAGNOSIS — H10.13 ALLERGIC CONJUNCTIVITIS, BILATERAL: ICD-10-CM

## 2024-08-09 RX ORDER — AZELASTINE HYDROCHLORIDE 0.5 MG/ML
1 SOLUTION/ DROPS OPHTHALMIC 2 TIMES DAILY
Qty: 6 ML | Refills: 5 | Status: SHIPPED | OUTPATIENT
Start: 2024-08-09

## 2024-08-09 RX ORDER — BLOOD-GLUCOSE SENSOR
1 EACH MISCELLANEOUS
Qty: 2 EACH | Refills: 4 | Status: SHIPPED | OUTPATIENT
Start: 2024-08-09

## 2024-08-09 RX ORDER — ROSUVASTATIN CALCIUM 10 MG/1
10 TABLET, COATED ORAL DAILY
Qty: 30 TABLET | Refills: 5 | OUTPATIENT
Start: 2024-08-09

## 2024-08-09 RX ORDER — KETOTIFEN FUMARATE 0.35 MG/ML
1 SOLUTION/ DROPS OPHTHALMIC EVERY 12 HOURS
Qty: 5 ML | Refills: 0 | Status: SHIPPED | OUTPATIENT
Start: 2024-08-09

## 2024-08-21 DIAGNOSIS — B00.1 COLD SORE: ICD-10-CM

## 2024-08-22 RX ORDER — VALACYCLOVIR HYDROCHLORIDE 500 MG/1
500 TABLET, FILM COATED ORAL 2 TIMES DAILY
Qty: 30 TABLET | Refills: 0 | Status: SHIPPED | OUTPATIENT
Start: 2024-08-22

## 2024-08-23 ENCOUNTER — MYC REFILL (OUTPATIENT)
Dept: FAMILY MEDICINE | Facility: CLINIC | Age: 49
End: 2024-08-23
Payer: COMMERCIAL

## 2024-08-23 DIAGNOSIS — J30.89 ALLERGIC RHINITIS DUE TO DUST MITE: ICD-10-CM

## 2024-08-23 DIAGNOSIS — H10.13 ALLERGIC CONJUNCTIVITIS, BILATERAL: ICD-10-CM

## 2024-08-23 DIAGNOSIS — E11.9 TYPE 2 DIABETES MELLITUS WITHOUT COMPLICATION, WITHOUT LONG-TERM CURRENT USE OF INSULIN (H): ICD-10-CM

## 2024-08-23 DIAGNOSIS — J45.20 MILD INTERMITTENT ASTHMA, UNSPECIFIED WHETHER COMPLICATED: ICD-10-CM

## 2024-08-23 RX ORDER — MONTELUKAST SODIUM 10 MG/1
10 TABLET ORAL AT BEDTIME
Qty: 90 TABLET | Refills: 3 | OUTPATIENT
Start: 2024-08-23

## 2024-08-23 RX ORDER — METFORMIN HCL 500 MG
1000 TABLET, EXTENDED RELEASE 24 HR ORAL 2 TIMES DAILY WITH MEALS
Qty: 360 TABLET | Refills: 0 | OUTPATIENT
Start: 2024-08-23

## 2024-08-23 RX ORDER — FLUTICASONE PROPIONATE 50 MCG
2 SPRAY, SUSPENSION (ML) NASAL DAILY
Qty: 48 G | Refills: 1 | Status: SHIPPED | OUTPATIENT
Start: 2024-08-23

## 2024-08-31 ENCOUNTER — HEALTH MAINTENANCE LETTER (OUTPATIENT)
Age: 49
End: 2024-08-31

## 2024-09-05 ASSESSMENT — ASTHMA QUESTIONNAIRES
QUESTION_1 LAST FOUR WEEKS HOW MUCH OF THE TIME DID YOUR ASTHMA KEEP YOU FROM GETTING AS MUCH DONE AT WORK, SCHOOL OR AT HOME: A LITTLE OF THE TIME
QUESTION_2 LAST FOUR WEEKS HOW OFTEN HAVE YOU HAD SHORTNESS OF BREATH: ONCE OR TWICE A WEEK
QUESTION_5 LAST FOUR WEEKS HOW WOULD YOU RATE YOUR ASTHMA CONTROL: SOMEWHAT CONTROLLED
QUESTION_3 LAST FOUR WEEKS HOW OFTEN DID YOUR ASTHMA SYMPTOMS (WHEEZING, COUGHING, SHORTNESS OF BREATH, CHEST TIGHTNESS OR PAIN) WAKE YOU UP AT NIGHT OR EARLIER THAN USUAL IN THE MORNING: ONCE OR TWICE
QUESTION_4 LAST FOUR WEEKS HOW OFTEN HAVE YOU USED YOUR RESCUE INHALER OR NEBULIZER MEDICATION (SUCH AS ALBUTEROL): ONCE A WEEK OR LESS
ACT_TOTALSCORE: 19
ACT_TOTALSCORE: 19

## 2024-09-06 ENCOUNTER — OFFICE VISIT (OUTPATIENT)
Dept: FAMILY MEDICINE | Facility: CLINIC | Age: 49
End: 2024-09-06
Payer: COMMERCIAL

## 2024-09-06 ENCOUNTER — MYC REFILL (OUTPATIENT)
Dept: FAMILY MEDICINE | Facility: CLINIC | Age: 49
End: 2024-09-06

## 2024-09-06 VITALS
DIASTOLIC BLOOD PRESSURE: 80 MMHG | WEIGHT: 203 LBS | BODY MASS INDEX: 29.06 KG/M2 | SYSTOLIC BLOOD PRESSURE: 120 MMHG | OXYGEN SATURATION: 98 % | HEART RATE: 85 BPM | TEMPERATURE: 97.9 F | HEIGHT: 70 IN | RESPIRATION RATE: 16 BRPM

## 2024-09-06 DIAGNOSIS — E11.65 TYPE 2 DIABETES MELLITUS WITH HYPERGLYCEMIA, WITHOUT LONG-TERM CURRENT USE OF INSULIN (H): ICD-10-CM

## 2024-09-06 DIAGNOSIS — Z23 ENCOUNTER FOR IMMUNIZATION: ICD-10-CM

## 2024-09-06 DIAGNOSIS — G47.33 OSA (OBSTRUCTIVE SLEEP APNEA): ICD-10-CM

## 2024-09-06 DIAGNOSIS — J06.9 UPPER RESPIRATORY TRACT INFECTION, UNSPECIFIED TYPE: ICD-10-CM

## 2024-09-06 DIAGNOSIS — J30.1 ALLERGIC RHINITIS DUE TO POLLEN, UNSPECIFIED SEASONALITY: ICD-10-CM

## 2024-09-06 DIAGNOSIS — L30.9 DERMATITIS: ICD-10-CM

## 2024-09-06 DIAGNOSIS — E11.9 TYPE 2 DIABETES MELLITUS WITHOUT COMPLICATION, WITHOUT LONG-TERM CURRENT USE OF INSULIN (H): ICD-10-CM

## 2024-09-06 DIAGNOSIS — E66.3 OVERWEIGHT: ICD-10-CM

## 2024-09-06 DIAGNOSIS — E78.5 HYPERLIPIDEMIA, UNSPECIFIED HYPERLIPIDEMIA TYPE: ICD-10-CM

## 2024-09-06 DIAGNOSIS — J45.31 MILD PERSISTENT ASTHMA WITH ACUTE EXACERBATION: ICD-10-CM

## 2024-09-06 DIAGNOSIS — Z00.00 ROUTINE PHYSICAL EXAMINATION: Primary | ICD-10-CM

## 2024-09-06 LAB
ALBUMIN SERPL BCG-MCNC: 4.3 G/DL (ref 3.5–5.2)
ALP SERPL-CCNC: 107 U/L (ref 40–150)
ALT SERPL W P-5'-P-CCNC: 70 U/L (ref 0–70)
ANION GAP SERPL CALCULATED.3IONS-SCNC: 10 MMOL/L (ref 7–15)
AST SERPL W P-5'-P-CCNC: 45 U/L (ref 0–45)
BILIRUB SERPL-MCNC: 0.3 MG/DL
BUN SERPL-MCNC: 10.1 MG/DL (ref 6–20)
CALCIUM SERPL-MCNC: 9.2 MG/DL (ref 8.8–10.4)
CHLORIDE SERPL-SCNC: 101 MMOL/L (ref 98–107)
CHOLEST SERPL-MCNC: 135 MG/DL
CREAT SERPL-MCNC: 0.84 MG/DL (ref 0.67–1.17)
EGFRCR SERPLBLD CKD-EPI 2021: >90 ML/MIN/1.73M2
ERYTHROCYTE [DISTWIDTH] IN BLOOD BY AUTOMATED COUNT: 12.4 % (ref 10–15)
GLUCOSE SERPL-MCNC: 97 MG/DL (ref 70–99)
HBA1C MFR BLD: 6.2 % (ref 0–5.6)
HCO3 SERPL-SCNC: 26 MMOL/L (ref 22–29)
HCT VFR BLD AUTO: 47.9 % (ref 40–53)
HDLC SERPL-MCNC: 60 MG/DL
HGB BLD-MCNC: 16.3 G/DL (ref 13.3–17.7)
HOLD SPECIMEN: NORMAL
LDLC SERPL CALC-MCNC: 48 MG/DL
MCH RBC QN AUTO: 30.8 PG (ref 26.5–33)
MCHC RBC AUTO-ENTMCNC: 34 G/DL (ref 31.5–36.5)
MCV RBC AUTO: 91 FL (ref 78–100)
NONHDLC SERPL-MCNC: 75 MG/DL
PLATELET # BLD AUTO: 222 10E3/UL (ref 150–450)
POTASSIUM SERPL-SCNC: 4.4 MMOL/L (ref 3.4–5.3)
PROT SERPL-MCNC: 7.3 G/DL (ref 6.4–8.3)
RBC # BLD AUTO: 5.29 10E6/UL (ref 4.4–5.9)
SODIUM SERPL-SCNC: 137 MMOL/L (ref 135–145)
TRIGL SERPL-MCNC: 134 MG/DL
WBC # BLD AUTO: 5.5 10E3/UL (ref 4–11)

## 2024-09-06 PROCEDURE — 36415 COLL VENOUS BLD VENIPUNCTURE: CPT | Performed by: FAMILY MEDICINE

## 2024-09-06 PROCEDURE — 99396 PREV VISIT EST AGE 40-64: CPT | Performed by: FAMILY MEDICINE

## 2024-09-06 PROCEDURE — 99214 OFFICE O/P EST MOD 30 MIN: CPT | Mod: 25 | Performed by: FAMILY MEDICINE

## 2024-09-06 PROCEDURE — 83036 HEMOGLOBIN GLYCOSYLATED A1C: CPT | Performed by: FAMILY MEDICINE

## 2024-09-06 PROCEDURE — 80053 COMPREHEN METABOLIC PANEL: CPT | Performed by: FAMILY MEDICINE

## 2024-09-06 PROCEDURE — 85027 COMPLETE CBC AUTOMATED: CPT | Performed by: FAMILY MEDICINE

## 2024-09-06 PROCEDURE — 80061 LIPID PANEL: CPT | Performed by: FAMILY MEDICINE

## 2024-09-06 RX ORDER — METFORMIN HCL 500 MG
1000 TABLET, EXTENDED RELEASE 24 HR ORAL 2 TIMES DAILY WITH MEALS
Qty: 360 TABLET | Refills: 0 | Status: SHIPPED | OUTPATIENT
Start: 2024-09-06

## 2024-09-06 RX ORDER — PREDNISONE 20 MG/1
20 TABLET ORAL 2 TIMES DAILY
Qty: 10 TABLET | Refills: 0 | Status: SHIPPED | OUTPATIENT
Start: 2024-09-06 | End: 2024-09-11

## 2024-09-06 ASSESSMENT — PAIN SCALES - GENERAL: PAINLEVEL: NO PAIN (0)

## 2024-09-06 NOTE — PROGRESS NOTES
Assessment/Plan:     Routine physical examination  Routine healthcare maintenance.  Preventative cares reviewed.  Annual physical exams to continue.    Overweight  Overweight status.  Has lost additional weight with benefits of Mounjaro 7.5 mg weekly.  Continue attempts at weight goal less than 190 pounds ideally.    Type 2 diabetes mellitus with hyperglycemia, without long-term current use of insulin (H)  Significant improvement in A1c's from 10.6% down to 6.2% previously on April 24, 2024 and A1c remains at 6.2% today.  Had cut back on metformin  mg from 2 tablets twice daily down to 1 tablet twice daily over the last week due to running low on medication otherwise tolerates without GI side effects.  Monofilament testing was normal.  Diabetic eye exams to continue annually.  Microalbumin screen 4/24/2024 was normal.  Reassess at follow-up in 4 months.  Wean from metformin as able while continuing Mounjaro which he elects to remain at 7.5 mg weekly dose until follow-up in 4 months.  - Comprehensive metabolic panel    Hyperlipidemia, unspecified hyperlipidemia type  Patient had been started on rosuvastatin 10 mg daily and tolerated well and will update lipid cascade with weight goal less than 190 pounds ideally.  - Lipid panel reflex to direct LDL Fasting    Mild persistent asthma with acute exacerbation  Mild persistent asthma with current exacerbation secondary to URI with diffuse expiratory wheeze.  Prednisone 20 mg twice daily x 5 days.  Utilizes Advair 250/50 using 1 inhalation twice daily with albuterol MDI on as-needed basis.  - predniSONE (DELTASONE) 20 MG tablet  Dispense: 10 tablet; Refill: 0    Allergic rhinitis due to pollen, unspecified seasonality  Continue allergy management as noted with multiple medications including montelukast 10 mg every afternoon etc.    Dermatitis  Dermatitis right greater than left lower extremity.  Using clobetasol topically.  Has upcoming appointment with  dermatologist.  States triple antibiotic works better.  No signs of secondary bacterial infection at this time.    KRISTEL (obstructive sleep apnea)  KRISTEL.  Uses CPAP perhaps twice a week on a more as needed basis per patient.    Upper respiratory tract infection, unspecified type  Symptomatic management discussed for URI symptoms.  - CBC with platelets    Encounter for immunization  Will defer flu shot currently until completion of prednisone course.  Will receive at earliest convenience in the next 2+ weeks.                 Subjective:     Silviano Collins is a 48 year old male who presents for an annual exam.  Patient in general doing well otherwise has had cold this week starting Monday with congested cough.  Cough better with Mucinex however chest congestion continues.  Uses Advair 250/50 twice daily for asthma management with albuterol MDI on as-needed basis.  Was started on rosuvastatin 10 mg daily following prior office visit with underlying history of type 2 diabetes reviewed.  Continues Mounjaro to 7.5 mg weekly which she is tolerating.  Has achieved weight loss with this stating weight at home around 197 pounds.  Utilizes metformin  mg using 2 tablets twice daily.  Cut back this week to 1 tablet twice daily since he was running low on medication.  Has been prescribed modafinil in the past for fatigue which he feels is associated with sleep apnea historically.  Outside provider prescribes this medication.  Comprehensive review of systems as above otherwise all negative.       - Lindsay   1 son - 26   Tobacco:  none   EtOH:  beer (once a week 6-8)   Mom -  40 with diabetes dxd in high school   Dad -   1 older bro -   Surgeries:  nasal   Hospitalizations:  none   Work:  remodeling (quoting and sales management)   Hobbies:  boating, jetskiing, water skiing       Healthy Habits:   HPI     Immunization History   Administered Date(s) Administered    COVID-19 12+ (Pfizer) 2023    COVID-19  Bivalent 12+ (Pfizer) 12/09/2022    COVID-19 MONOVALENT 12+ (Pfizer) 03/25/2021, 04/15/2021, 11/10/2021    Influenza (IIV3) PF 09/15/2015    Influenza Intranasal Vaccine 10/17/2011    Influenza Vaccine >6 months,quad, PF 02/18/2020, 12/23/2021, 11/20/2023    Influenza,INJ,MDCK,PF,Quad >6mo(Flucelvax) 10/08/2020    Pneumococcal 20 valent Conjugate (Prevnar 20) 06/21/2022    Pneumococcal 23 valent 05/04/2021    TD,PF 7+ (Tenivac) 11/01/2004    TDAP Vaccine (Boostrix) 02/27/2015     Immunization status: Will receive flu shot in the next 2+ weeks following current completion of prednisone for asthma exacerbation.    Current Outpatient Medications   Medication Sig Dispense Refill    albuterol (PROAIR HFA/PROVENTIL HFA/VENTOLIN HFA) 108 (90 Base) MCG/ACT inhaler Inhale 2 puffs into the lungs every 6 hours 36 g 3    alcohol swab prep pads Use to swab area of injection/dominick as directed. 100 each 0    azelastine (ASTELIN) 0.1 % nasal spray Spray 2 sprays into both nostrils 2 times daily 30 mL 11    azelastine (OPTIVAR) 0.05 % ophthalmic solution Place 1 drop into both eyes 2 times daily 6 mL 5    blood glucose (NO BRAND SPECIFIED) test strip Use to test blood sugar once daily or as directed. To accompany: Blood Glucose Monitor Brands: per insurance. 100 strip 0    blood glucose calibration (NO BRAND SPECIFIED) solution To accompany: Blood Glucose Monitor Brands: per insurance. 1 each 0    blood glucose monitoring (NO BRAND SPECIFIED) meter device kit Use to test blood sugar one time daily or as directed. Preferred blood glucose meter OR supplies to accompany: Blood Glucose Monitor Brands: per insurance. 1 kit 0    clobetasol (TEMOVATE) 0.05 % external ointment Apply sparingly to affected area two times daily as needed but not more than 14 days in a row. Spare face, armpits, neck, and groin. 60 g 3    Continuous Blood Gluc  (DEXCOM G6 ) EVA Use to read blood sugars as per 's instructions. 1 each  0    Continuous Glucose Sensor (FREESTYLE EKATERINA 3 SENSOR) [facility] 1 each every 14 days 2 each 4    fluticasone (FLONASE) 50 MCG/ACT nasal spray Spray 2 sprays into both nostrils daily. 48 g 1    fluticasone-salmeterol (ADVAIR) 250-50 MCG/ACT inhaler Inhale 1 puff into the lungs every 12 hours 60 each 5    ketotifen fumarate 0.035% 0.035 % SOLN ophthalmic solution Place 1 drop into both eyes every 12 hours 5 mL 0    levocetirizine (XYZAL) 5 MG tablet Take 1 tablet (5 mg) by mouth 2 times daily 60 tablet 3    loratadine-pseudoePHEDrine (SM ALLERGY RELIEF D)  MG 24 hr tablet Take 1 tablet by mouth daily 30 tablet 1    metFORMIN (GLUCOPHAGE XR) 500 MG 24 hr tablet Take 2 tablets (1,000 mg) by mouth 2 times daily (with meals) 360 tablet 0    modafinil (PROVIGIL) 200 MG tablet Take 1 tablet (200 mg) by mouth daily Take 1 tab every morning for sleepiness due to KRISTEL 30 tablet 0    montelukast (SINGULAIR) 10 MG tablet Take 1 tablet (10 mg) by mouth at bedtime 90 tablet 3    MULTI-VITAMIN OR TABS       predniSONE (DELTASONE) 20 MG tablet Take 1 tablet (20 mg) by mouth 2 times daily for 5 days. 10 tablet 0    rosuvastatin (CRESTOR) 10 MG tablet Take 1 tablet (10 mg) by mouth daily 30 tablet 5    sertraline (ZOLOFT) 50 MG tablet Take 1 tablet (50 mg) by mouth daily as needed (sex) Take one tablet 8-12 hours before sex 30 tablet 1    sildenafil (VIAGRA) 25 MG tablet Take 1 tablet (25 mg) by mouth daily as needed 90 tablet 0    thin (NO BRAND SPECIFIED) lancets Use with lanceting device. To accompany: Blood Glucose Monitor Brands: per insurance. 100 each 0    tirzepatide (MOUNJARO) 7.5 MG/0.5ML pen Inject 7.5 mg subcutaneously every 7 days 2 mL 2    triamcinolone (KENALOG) 0.1 % external ointment Apply topically 2 times daily 30 g 1    valACYclovir (VALTREX) 500 MG tablet TAKE 1 TABLET BY MOUTH 2 TIMES DAILY 30 tablet 0    tirzepatide (MOUNJARO) 5 MG/0.5ML pen INJECT 5 MG UNDER THE SKIN EVERY 7 DAYS 2 mL 1     Past Medical  History:   Diagnosis Date    Allergic rhinitis due to other allergen     seasonal allergies (spring and fall)    Mild intermittent asthma     exercise, seasonal, associated with URIs     Past Surgical History:   Procedure Laterality Date    HC TOOTH EXTRACTION W/FORCEP  1995    4 wisdom teeth removed withotu complications     Gramineae pollens  Family History   Problem Relation Age of Onset    Diabetes Mother         D: 40 pneumonia,  Type I DM    Family History Negative Father     Family History Negative Brother      Social History     Socioeconomic History    Marital status:      Spouse name: Not on file    Number of children: Not on file    Years of education: Not on file    Highest education level: Not on file   Occupational History    Not on file   Tobacco Use    Smoking status: Former    Smokeless tobacco: Never   Vaping Use    Vaping status: Never Used   Substance and Sexual Activity    Alcohol use: Yes     Comment: 2x month    Drug use: Never    Sexual activity: Not Currently   Other Topics Concern    Not on file   Social History Narrative    09/28/23         ENVIRONMENTAL HISTORY: [place] The home is heated with a forced air. They does have central air conditioning. The patient's bedroom is furnished with feather/wool bedding or pillows and carpet in the bedroom.  Pets inside the house include 2 dog(s). There is no history of cockroach or mice infestation. There is/are 0 smokers in the house.  The house does not have a damp basement.      Social Determinants of Health     Financial Resource Strain: Low Risk  (9/5/2024)    Financial Resource Strain     Within the past 12 months, have you or your family members you live with been unable to get utilities (heat, electricity) when it was really needed?: No   Food Insecurity: Low Risk  (9/5/2024)    Food Insecurity     Within the past 12 months, did you worry that your food would run out before you got money to  "buy more?: No     Within the past 12 months, did the food you bought just not last and you didn t have money to get more?: No   Transportation Needs: Low Risk  (9/5/2024)    Transportation Needs     Within the past 12 months, has lack of transportation kept you from medical appointments, getting your medicines, non-medical meetings or appointments, work, or from getting things that you need?: No   Physical Activity: Insufficiently Active (9/5/2024)    Exercise Vital Sign     Days of Exercise per Week: 1 day     Minutes of Exercise per Session: 20 min   Stress: No Stress Concern Present (9/5/2024)    [employer] [place] [employer] - Occupational Stress Questionnaire     Feeling of Stress : Only a little   Social Connections: Unknown (9/5/2024)    Social Connection and Isolation Panel [NHANES]     Frequency of Communication with Friends and Family: Not on file     Frequency of Social Gatherings with Friends and Family: Once a week     Attends [employer] Services: Not on file     Active Member of Clubs or Organizations: Not on file     Attends Club or Organization Meetings: Not on file     Marital Status: Not on file   Interpersonal Safety: Low Risk  (9/6/2024)    Interpersonal Safety     Do you feel physically and emotionally safe where you currently live?: Yes     Within the past 12 months, have you been hit, slapped, kicked or otherwise physically hurt by someone?: No     Within the past 12 months, have you been humiliated or emotionally abused in other ways by your partner or ex-partner?: No   Housing Stability: Low Risk  (9/5/2024)    Housing Stability     Do you have housing? : Yes     Are you worried about losing your housing?: No       Review of Systems  Comprehensive ROS: as above, otherwise all negative.           Objective:     /80   Pulse 85   Temp 97.9  F (36.6  C)   Resp 16   Ht 1.784 m (5' 10.25\")   Wt 92.1 kg (203 lb)   SpO2 98%   BMI 28.92 kg/m    Body mass index is 28.92 " kg/m .    Physical    General Appearance:    Alert, cooperative, no distress, appears stated age.  Patient sounds congested.   Head:    Normocephalic, without obvious abnormality, atraumatic   Eyes:    PERRL, conjunctiva/corneas clear, EOM's intact, fundi     benign, both eyes        Ears:    Normal TM's and external ear canals, both ears   Nose:   Nares normal, septum midline, mucosa normal, no drainage    or sinus tenderness   Throat:   Lips, mucosa, and tongue normal; teeth and gums normal   Neck:   Supple, symmetrical, trachea midline, no adenopathy;        thyroid:  No enlargement/tenderness/nodules; no carotid    bruit or JVD   Back:     Symmetric, no curvature, ROM normal, no CVA tenderness   Lungs:   Diffuse expiratory wheeze to auscultation bilaterally, respirations unlabored   Chest wall:    No tenderness or deformity   Heart:    Regular rate and rhythm, S1 and S2 normal, no murmur, rub   or gallop   Abdomen:     Soft, non-tender, bowel sounds active all four quadrants,     no masses, no organomegaly.     Genitalia:    Normal male without lesion, discharge or tenderness.  No inguinal hernia noted.     Rectal:    deferred   Extremities:   Extremities normal, atraumatic, no cyanosis or edema   Pulses:   2+ and symmetric all extremities   Skin:   Skin color, texture, turgor normal, or lesions.  Patchy dermatitis right greater than left lower extremity without excoriation.  No drainage.   Lymph nodes:   Cervical, supraclavicular, and axillary nodes normal   Neurologic:   CNII-XII intact. Normal strength, sensation and reflexes       throughout                This note has been dictated using voice recognition software and as a result may contain minor grammatical errors and unintended word substitutions.

## 2024-09-09 ENCOUNTER — MYC REFILL (OUTPATIENT)
Dept: FAMILY MEDICINE | Facility: CLINIC | Age: 49
End: 2024-09-09
Payer: COMMERCIAL

## 2024-09-09 DIAGNOSIS — E78.5 HYPERLIPIDEMIA, UNSPECIFIED HYPERLIPIDEMIA TYPE: ICD-10-CM

## 2024-09-10 RX ORDER — ROSUVASTATIN CALCIUM 10 MG/1
10 TABLET, COATED ORAL DAILY
Qty: 30 TABLET | Refills: 5 | OUTPATIENT
Start: 2024-09-10

## 2024-09-23 ENCOUNTER — MYC REFILL (OUTPATIENT)
Dept: FAMILY MEDICINE | Facility: CLINIC | Age: 49
End: 2024-09-23
Payer: COMMERCIAL

## 2024-09-23 DIAGNOSIS — J45.31 MILD PERSISTENT ASTHMA WITH EXACERBATION: ICD-10-CM

## 2024-09-23 RX ORDER — FLUTICASONE PROPIONATE AND SALMETEROL 250; 50 UG/1; UG/1
1 POWDER RESPIRATORY (INHALATION) EVERY 12 HOURS
Qty: 60 EACH | Refills: 5 | OUTPATIENT
Start: 2024-09-23

## 2024-10-02 ENCOUNTER — MYC REFILL (OUTPATIENT)
Dept: FAMILY MEDICINE | Facility: CLINIC | Age: 49
End: 2024-10-02

## 2024-10-02 ENCOUNTER — OFFICE VISIT (OUTPATIENT)
Dept: DERMATOLOGY | Facility: CLINIC | Age: 49
End: 2024-10-02
Attending: FAMILY MEDICINE
Payer: COMMERCIAL

## 2024-10-02 DIAGNOSIS — L30.1 DYSHIDROTIC ECZEMA: ICD-10-CM

## 2024-10-02 DIAGNOSIS — L30.9 DERMATITIS: ICD-10-CM

## 2024-10-02 DIAGNOSIS — R21 RASH: Primary | ICD-10-CM

## 2024-10-02 PROCEDURE — 88305 TISSUE EXAM BY PATHOLOGIST: CPT | Performed by: DERMATOLOGY

## 2024-10-02 PROCEDURE — 87077 CULTURE AEROBIC IDENTIFY: CPT | Performed by: PHYSICIAN ASSISTANT

## 2024-10-02 PROCEDURE — 11104 PUNCH BX SKIN SINGLE LESION: CPT | Performed by: PHYSICIAN ASSISTANT

## 2024-10-02 PROCEDURE — 87186 SC STD MICRODIL/AGAR DIL: CPT | Mod: XE | Performed by: PHYSICIAN ASSISTANT

## 2024-10-02 PROCEDURE — 87070 CULTURE OTHR SPECIMN AEROBIC: CPT | Performed by: PHYSICIAN ASSISTANT

## 2024-10-02 PROCEDURE — 99204 OFFICE O/P NEW MOD 45 MIN: CPT | Mod: 25 | Performed by: PHYSICIAN ASSISTANT

## 2024-10-02 PROCEDURE — 88312 SPECIAL STAINS GROUP 1: CPT | Performed by: DERMATOLOGY

## 2024-10-02 RX ORDER — CLOBETASOL PROPIONATE 0.5 MG/G
OINTMENT TOPICAL
Qty: 60 G | Refills: 3 | Status: SHIPPED | OUTPATIENT
Start: 2024-10-02

## 2024-10-02 NOTE — NURSING NOTE
Silviano Collins's goals for this visit include:   Chief Complaint   Patient presents with    Rash     Rash, Dyshidrotic eczema [L30.1]ref by Thompson Schmitz MD in [facility] MEDICINE/OB.  The rash is on both feet and hands, been there for at least 2 months. Pt does have diabetes, type 2, and is not very controlled. Using clobetasol ointment and it doesn't do anything.          He requests these members of his care team be copied on today's visit information:     PCP: Thompson Schmitz    Referring Provider:  Referred Self, MD  No address on file    There were no vitals taken for this visit.    Do you need any medication refills at today's visit?     Claribel Riggins LPN on 10/2/2024 at 8:48 AM

## 2024-10-02 NOTE — PROGRESS NOTES
[facility] Dermatology Note  Encounter Date: Oct 2, 2024  Office Visit      Dermatology Problem List:    iSK S/p cryo  Rash - Dyshidrotic Eczema vs pustular psoriasis  vs vesiculabullous tinea vs other  - S/p Punch biopsy and bacterial culture performed on the L palm, 10/2/24, pending results  - Tx: clobetasol cream, epsom salt baths  - previous tx: OTC antifungal cream, OTC antibacterial ointment  ____________________________________________    Assessment & Plan:  # Rash Ddx: Dyshidrotic Eczema vs pustular psoriasis  vs vesiculobullous tinea vs other  - Discussed the etiology of this condition as well as treatment options and their side effects  - bacterial culture, pending  - Punch biopsy performed today on the L palm, see procedure note below.  - Recommended continuing use of epsom salt since he has noticed improvement with it.   - Refilled his clobetasol cream for now  - Photograph was obtained for clinical monitoring and inclusion in medical record.    Procedures Performed:   - Punch biopsy procedure note, location(s): see above. After discussion of benefits and risks including but not limited to bleeding, infection, scar, incomplete removal, recurrence, and non-diagnostic biopsy, written consent and photographs were obtained. The area was cleaned with isopropyl alcohol. 0.5mL of 1% lidocaine with epinephrine was injected to obtain adequate anesthesia and a 5 mm punch biopsy was performed at site(s). 3-0 Prolene sutures were utilized to approximate the epidermal edges. White petrolatum ointment and a bandage was applied to the wound. Explicit verbal and written wound care instructions were provided. The patient left the dermatology clinic in good condition.      - bacterial swab obtained today    Follow-up: pending path results    Staff and scribe:    Scribe Disclosure:   ZINA ZARATE, am serving as a scribe; to document services personally performed by China Acevedo PA-C -based on  data collection and the provider's statements to me.     Provider Disclosure:  I agree with above History, Review of Systems, Physical exam and Plan.  I have reviewed the content of the documentation and have edited it as needed. I have personally performed the services documented here and the documentation accurately represents those services and the decisions I have made.      Electronically signed by:     All risks, benefits and alternatives were discussed with patient.  Patient is in agreement and understands the assessment and plan.  All questions were answered.    China Acevedo PA-C, MPAS  [facility]: Phone: [phone], Fax: [phone]  [facility]: Phone: [phone],  Fax: [phone]  [facility]: Phone: [phone], Fax: [phone]  ____________________________________________    CC: Rash (Rash, Dyshidrotic eczema [L30.1]ref by Thompson Schmitz MD in [facility] MEDICINE/OB.  The rash is on both feet and hands, been there for at least 2 months. Pt does have diabetes, type 2, and is not very controlled. Using clobetasol ointment and it doesn't do anything. /)      Reviewed patients past medical history and pertinent chart review prior to patient's visit today.     HPI:  Mr. Silviano Collisn is a 48 year old male who presents today as a return patient for a rash evaluation. Referred by Thompson Schmitz MD in [facility] MEDICINE/OB.  The rash is on both feet and hands, been there for at least 2 months. Pt does have diabetes, type 2, and is not very controlled. Using clobetasol ointment and has not noticed improvement but in the past it did give him relief. Also tried OTC antifungals and OTC triple abx. Not sure if these help more than clobetasol. What seems to help him the most is using a foot bath with rotating scrubbers along with some epsom salts. Hat seems to heal up his skin for  several days if he does this.    Denied any family hx of psoriasis     Denied any major stressors.     Patient is otherwise feeling well, without additional concerns.    Labs:  Bacterial swab obtained    Physical Exam:  Vitals: There were no vitals taken for this visit.  SKIN: Focused examination of the face, hands and feet  - erythematous scaly rash interspersed with micro vesicles on both hands and feet  - feet have fissures.    - No other lesions of concern on areas examined.                   Medications:  Current Outpatient Medications   Medication Sig Dispense Refill    albuterol (PROAIR HFA/PROVENTIL HFA/VENTOLIN HFA) 108 (90 Base) MCG/ACT inhaler Inhale 2 puffs into the lungs every 6 hours 36 g 3    alcohol swab prep pads Use to swab area of injection/dominick as directed. 100 each 0    azelastine (ASTELIN) 0.1 % nasal spray Spray 2 sprays into both nostrils 2 times daily 30 mL 11    azelastine (OPTIVAR) 0.05 % ophthalmic solution Place 1 drop into both eyes 2 times daily 6 mL 5    blood glucose (NO BRAND SPECIFIED) test strip Use to test blood sugar once daily or as directed. To accompany: Blood Glucose Monitor Brands: per insurance. 100 strip 0    blood glucose calibration (NO BRAND SPECIFIED) solution To accompany: Blood Glucose Monitor Brands: per insurance. 1 each 0    blood glucose monitoring (NO BRAND SPECIFIED) meter device kit Use to test blood sugar one time daily or as directed. Preferred blood glucose meter OR supplies to accompany: Blood Glucose Monitor Brands: per insurance. 1 kit 0    clobetasol (TEMOVATE) 0.05 % external ointment Apply sparingly to affected area two times daily as needed but not more than 14 days in a row. Spare face, armpits, neck, and groin. 60 g 3    Continuous Glucose Sensor (FREESTYLE EKATERINA 3 SENSOR) MISC 1 each every 14 days 2 each 4    fluticasone (FLONASE) 50 MCG/ACT nasal spray Spray 2 sprays into both nostrils daily. 48 g 1    fluticasone-salmeterol (ADVAIR) 250-50  MCG/ACT inhaler Inhale 1 puff into the lungs every 12 hours 60 each 5    ketotifen fumarate 0.035% 0.035 % SOLN ophthalmic solution Place 1 drop into both eyes every 12 hours 5 mL 0    levocetirizine (XYZAL) 5 MG tablet Take 1 tablet (5 mg) by mouth 2 times daily 60 tablet 3    loratadine-pseudoePHEDrine (SM ALLERGY RELIEF D)  MG 24 hr tablet Take 1 tablet by mouth daily 30 tablet 1    metFORMIN (GLUCOPHAGE XR) 500 MG 24 hr tablet Take 2 tablets (1,000 mg) by mouth 2 times daily (with meals). 360 tablet 0    modafinil (PROVIGIL) 200 MG tablet Take 1 tablet (200 mg) by mouth daily Take 1 tab every morning for sleepiness due to KRISTEL 30 tablet 0    montelukast (SINGULAIR) 10 MG tablet Take 1 tablet (10 mg) by mouth at bedtime 90 tablet 3    MULTI-VITAMIN OR TABS       rosuvastatin (CRESTOR) 10 MG tablet Take 1 tablet (10 mg) by mouth daily 30 tablet 5    sertraline (ZOLOFT) 50 MG tablet Take 1 tablet (50 mg) by mouth daily as needed (sex) Take one tablet 8-12 hours before sex 30 tablet 1    sildenafil (VIAGRA) 25 MG tablet Take 1 tablet (25 mg) by mouth daily as needed 90 tablet 0    thin (NO BRAND SPECIFIED) lancets Use with lanceting device. To accompany: Blood Glucose Monitor Brands: per insurance. 100 each 0    tirzepatide (MOUNJARO) 7.5 MG/0.5ML pen Inject 7.5 mg subcutaneously every 7 days 2 mL 2    Continuous Blood Gluc  (DEXCOM G6 ) EVA Use to read blood sugars as per 's instructions. (Patient not taking: Reported on 10/2/2024) 1 each 0    triamcinolone (KENALOG) 0.1 % external ointment Apply topically 2 times daily (Patient not taking: Reported on 10/2/2024) 30 g 1    valACYclovir (VALTREX) 500 MG tablet TAKE 1 TABLET BY MOUTH 2 TIMES DAILY (Patient not taking: Reported on 10/2/2024) 30 tablet 0     No current facility-administered medications for this visit.      Past Medical/Surgical History:   Patient Active Problem List   Diagnosis    Allergic rhinitis due to pollen     Mild intermittent asthma    Type 2 diabetes mellitus with hyperglycemia, without long-term current use of insulin (H)    Hyperlipidemia    Obesity    KRISTEL (obstructive sleep apnea)    Asthma    Vitamin D deficiency     Past Medical History:   Diagnosis Date    Allergic rhinitis due to other allergen     seasonal allergies (spring and fall)    Mild intermittent asthma     exercise, seasonal, associated with URIs

## 2024-10-02 NOTE — NURSING NOTE
The following medication was given:     MEDICATION:  Lidocaine with epinephrine 1% 1:192021  ROUTE: SQ  SITE: see procedure note  DOSE: 1.5mL  LOT #: [id number]  : travayl  EXPIRATION DATE: 06-  NDC#: 29349-918-07  Was there drug waste? .5mL  Multi-dose vial: Yes    Claribel Riggins LPN  October 2, 2024

## 2024-10-02 NOTE — LETTER
10/2/2024      Silviano Collins  [address] [facility] [address] [facility] [address]      Dear Colleague,    Thank you for referring your patient, Silviano Collins, to the [facility]. Please see a copy of my visit note below.    [facility] Dermatology Note  Encounter Date: Oct 2, 2024  Office Visit      Dermatology Problem List:    iSK S/p cryo  Rash - Dyshidrotic Eczema vs pustular psoriasis  vs vesiculabullous tinea vs other  - S/p Punch biopsy and bacterial culture performed on the L palm, 10/2/24, pending results  - Tx: clobetasol cream, epsom salt baths  - previous tx: OTC antifungal cream, OTC antibacterial ointment  ____________________________________________    Assessment & Plan:  # Rash Ddx: Dyshidrotic Eczema vs pustular psoriasis  vs vesiculobullous tinea vs other  - Discussed the etiology of this condition as well as treatment options and their side effects  - bacterial culture, pending  - Punch biopsy performed today on the L palm, see procedure note below.  - Recommended continuing use of epsom salt since he has noticed improvement with it.   - Refilled his clobetasol cream for now  - Photograph was obtained for clinical monitoring and inclusion in medical record.    Procedures Performed:   - Punch biopsy procedure note, location(s): see above. After discussion of benefits and risks including but not limited to bleeding, infection, scar, incomplete removal, recurrence, and non-diagnostic biopsy, written consent and photographs were obtained. The area was cleaned with isopropyl alcohol. 0.5mL of 1% lidocaine with epinephrine was injected to obtain adequate anesthesia and a 5 mm punch biopsy was performed at site(s). 3-0 Prolene sutures were utilized to approximate the epidermal edges. White petrolatum ointment and a bandage was applied to the wound. Explicit verbal and written wound care instructions were provided. The patient left the dermatology clinic in  good condition.      - bacterial swab obtained today    Follow-up: pending path results    Staff and scribe:    Scribe Disclosure:   I, ZINA DIMITRIS, am serving as a scribe; to document services personally performed by China Acevedo PA-C -based on data collection and the provider's statements to me.     Provider Disclosure:  I agree with above History, Review of Systems, Physical exam and Plan.  I have reviewed the content of the documentation and have edited it as needed. I have personally performed the services documented here and the documentation accurately represents those services and the decisions I have made.      Electronically signed by:     All risks, benefits and alternatives were discussed with patient.  Patient is in agreement and understands the assessment and plan.  All questions were answered.    China Acevedo PA-C, [facility]  [facility]: Phone: [phone], Fax: [phone]  [facility]: Phone: [phone],  Fax: [phone]  [facility]: Phone: [phone], Fax: [phone]  ____________________________________________    CC: Rash (Rash, Dyshidrotic eczema [L30.1]ref by Thompson Schmitz MD in [facility]/OB.  The rash is on both feet and hands, been there for at least 2 months. Pt does have diabetes, type 2, and is not very controlled. Using clobetasol ointment and it doesn't do anything. /)      Reviewed patients past medical history and pertinent chart review prior to patient's visit today.     HPI:  Mr. Silviano Collins is a 48 year old male who presents today as a return patient for a rash evaluation. Referred by Thompson Schmitz MD in [facility] MEDICINE/OB.  The rash is on both feet and hands, been there for at least 2 months. Pt does have diabetes, type 2, and is not very controlled. Using clobetasol ointment and has not noticed improvement but in the past it  did give him relief. Also tried OTC antifungals and OTC triple abx. Not sure if these help more than clobetasol. What seems to help him the most is using a foot bath with rotating scrubbers along with some epsom salts. Hat seems to heal up his skin for several days if he does this.    Denied any family hx of psoriasis     Denied any major stressors.     Patient is otherwise feeling well, without additional concerns.    Labs:  Bacterial swab obtained    Physical Exam:  Vitals: There were no vitals taken for this visit.  SKIN: Focused examination of the face, hands and feet  - erythematous scaly rash interspersed with micro vesicles on both hands and feet  - feet have fissures.    - No other lesions of concern on areas examined.                   Medications:  Current Outpatient Medications   Medication Sig Dispense Refill     albuterol (PROAIR HFA/PROVENTIL HFA/VENTOLIN HFA) 108 (90 Base) MCG/ACT inhaler Inhale 2 puffs into the lungs every 6 hours 36 g 3     alcohol swab prep pads Use to swab area of injection/dominick as directed. 100 each 0     azelastine (ASTELIN) 0.1 % nasal spray Spray 2 sprays into both nostrils 2 times daily 30 mL 11     azelastine (OPTIVAR) 0.05 % ophthalmic solution Place 1 drop into both eyes 2 times daily 6 mL 5     blood glucose (NO BRAND SPECIFIED) test strip Use to test blood sugar once daily or as directed. To accompany: Blood Glucose Monitor Brands: per insurance. 100 strip 0     blood glucose calibration (NO BRAND SPECIFIED) solution To accompany: Blood Glucose Monitor Brands: per insurance. 1 each 0     blood glucose monitoring (NO BRAND SPECIFIED) meter device kit Use to test blood sugar one time daily or as directed. Preferred blood glucose meter OR supplies to accompany: Blood Glucose Monitor Brands: per insurance. 1 kit 0     clobetasol (TEMOVATE) 0.05 % external ointment Apply sparingly to affected area two times daily as needed but not more than 14 days in a row. Spare face,  armpits, neck, and groin. 60 g 3     Continuous Glucose Sensor (FREESTYLE EKATERINA 3 SENSOR) MISC 1 each every 14 days 2 each 4     fluticasone (FLONASE) 50 MCG/ACT nasal spray Spray 2 sprays into both nostrils daily. 48 g 1     fluticasone-salmeterol (ADVAIR) 250-50 MCG/ACT inhaler Inhale 1 puff into the lungs every 12 hours 60 each 5     ketotifen fumarate 0.035% 0.035 % SOLN ophthalmic solution Place 1 drop into both eyes every 12 hours 5 mL 0     levocetirizine (XYZAL) 5 MG tablet Take 1 tablet (5 mg) by mouth 2 times daily 60 tablet 3     loratadine-pseudoePHEDrine (SM ALLERGY RELIEF D)  MG 24 hr tablet Take 1 tablet by mouth daily 30 tablet 1     metFORMIN (GLUCOPHAGE XR) 500 MG 24 hr tablet Take 2 tablets (1,000 mg) by mouth 2 times daily (with meals). 360 tablet 0     modafinil (PROVIGIL) 200 MG tablet Take 1 tablet (200 mg) by mouth daily Take 1 tab every morning for sleepiness due to KRISTEL 30 tablet 0     montelukast (SINGULAIR) 10 MG tablet Take 1 tablet (10 mg) by mouth at bedtime 90 tablet 3     MULTI-VITAMIN OR TABS        rosuvastatin (CRESTOR) 10 MG tablet Take 1 tablet (10 mg) by mouth daily 30 tablet 5     sertraline (ZOLOFT) 50 MG tablet Take 1 tablet (50 mg) by mouth daily as needed (sex) Take one tablet 8-12 hours before sex 30 tablet 1     sildenafil (VIAGRA) 25 MG tablet Take 1 tablet (25 mg) by mouth daily as needed 90 tablet 0     thin (NO BRAND SPECIFIED) lancets Use with lanceting device. To accompany: Blood Glucose Monitor Brands: per insurance. 100 each 0     tirzepatide (MOUNJARO) 7.5 MG/0.5ML pen Inject 7.5 mg subcutaneously every 7 days 2 mL 2     Continuous Blood Gluc  (DEXCOM G6 ) EVA Use to read blood sugars as per 's instructions. (Patient not taking: Reported on 10/2/2024) 1 each 0     triamcinolone (KENALOG) 0.1 % external ointment Apply topically 2 times daily (Patient not taking: Reported on 10/2/2024) 30 g 1     valACYclovir (VALTREX) 500 MG  tablet TAKE 1 TABLET BY MOUTH 2 TIMES DAILY (Patient not taking: Reported on 10/2/2024) 30 tablet 0     No current facility-administered medications for this visit.      Past Medical/Surgical History:   Patient Active Problem List   Diagnosis     Allergic rhinitis due to pollen     Mild intermittent asthma     Type 2 diabetes mellitus with hyperglycemia, without long-term current use of insulin (H)     Hyperlipidemia     Obesity     KRISTEL (obstructive sleep apnea)     Asthma     Vitamin D deficiency     Past Medical History:   Diagnosis Date     Allergic rhinitis due to other allergen     seasonal allergies (spring and fall)     Mild intermittent asthma     exercise, seasonal, associated with URIs                        Again, thank you for allowing me to participate in the care of your patient.        Sincerely,        China Acevedo PA-C

## 2024-10-08 LAB
PATH REPORT.COMMENTS IMP SPEC: NORMAL
PATH REPORT.FINAL DX SPEC: NORMAL
PATH REPORT.GROSS SPEC: NORMAL
PATH REPORT.MICROSCOPIC SPEC OTHER STN: NORMAL
PATH REPORT.RELEVANT HX SPEC: NORMAL

## 2024-10-09 ENCOUNTER — TELEPHONE (OUTPATIENT)
Dept: FAMILY MEDICINE | Facility: CLINIC | Age: 49
End: 2024-10-09

## 2024-10-09 DIAGNOSIS — B95.8 STAPH SKIN INFECTION: Primary | ICD-10-CM

## 2024-10-09 DIAGNOSIS — L08.9 STAPH SKIN INFECTION: Primary | ICD-10-CM

## 2024-10-09 LAB
BACTERIA SKIN AEROBE CULT: ABNORMAL

## 2024-10-09 RX ORDER — CIPROFLOXACIN 500 MG/1
500 TABLET, FILM COATED ORAL 2 TIMES DAILY
Qty: 20 TABLET | Refills: 0 | Status: SHIPPED | OUTPATIENT
Start: 2024-10-09

## 2024-10-09 NOTE — TELEPHONE ENCOUNTER
LMTCB.-If pt calls back please inform him he needs to reschedule his RN visit today.     It is recommended that he wait 10-14 days to remove palm sutures.-Per Gerard, writer called and spoke with RN at clinic.    Earliest he should schedule to have sutures removed is Friday 10/11.    Please assist with scheduling pt for RN visit.    HUNTER Morales.

## 2024-10-13 ENCOUNTER — MYC REFILL (OUTPATIENT)
Dept: DERMATOLOGY | Facility: CLINIC | Age: 49
End: 2024-10-13
Payer: COMMERCIAL

## 2024-10-13 DIAGNOSIS — B95.8 STAPH SKIN INFECTION: ICD-10-CM

## 2024-10-13 DIAGNOSIS — L08.9 STAPH SKIN INFECTION: ICD-10-CM

## 2024-10-14 RX ORDER — CIPROFLOXACIN 500 MG/1
500 TABLET, FILM COATED ORAL 2 TIMES DAILY
Qty: 20 TABLET | Refills: 0 | OUTPATIENT
Start: 2024-10-14

## 2024-10-15 ENCOUNTER — OFFICE VISIT (OUTPATIENT)
Dept: FAMILY MEDICINE | Facility: CLINIC | Age: 49
End: 2024-10-15
Payer: COMMERCIAL

## 2024-10-15 VITALS
OXYGEN SATURATION: 97 % | TEMPERATURE: 97.2 F | RESPIRATION RATE: 15 BRPM | SYSTOLIC BLOOD PRESSURE: 136 MMHG | DIASTOLIC BLOOD PRESSURE: 80 MMHG | WEIGHT: 207 LBS | HEIGHT: 70 IN | BODY MASS INDEX: 29.63 KG/M2

## 2024-10-15 DIAGNOSIS — L30.9 DERMATITIS: Primary | ICD-10-CM

## 2024-10-15 DIAGNOSIS — E11.65 TYPE 2 DIABETES MELLITUS WITH HYPERGLYCEMIA, WITHOUT LONG-TERM CURRENT USE OF INSULIN (H): ICD-10-CM

## 2024-10-15 PROCEDURE — 99213 OFFICE O/P EST LOW 20 MIN: CPT | Performed by: FAMILY MEDICINE

## 2024-10-15 ASSESSMENT — ANXIETY QUESTIONNAIRES
2. NOT BEING ABLE TO STOP OR CONTROL WORRYING: SEVERAL DAYS
GAD7 TOTAL SCORE: 4
3. WORRYING TOO MUCH ABOUT DIFFERENT THINGS: SEVERAL DAYS
GAD7 TOTAL SCORE: 4
5. BEING SO RESTLESS THAT IT IS HARD TO SIT STILL: NOT AT ALL
GAD7 TOTAL SCORE: 4
1. FEELING NERVOUS, ANXIOUS, OR ON EDGE: SEVERAL DAYS
7. FEELING AFRAID AS IF SOMETHING AWFUL MIGHT HAPPEN: NOT AT ALL
8. IF YOU CHECKED OFF ANY PROBLEMS, HOW DIFFICULT HAVE THESE MADE IT FOR YOU TO DO YOUR WORK, TAKE CARE OF THINGS AT HOME, OR GET ALONG WITH OTHER PEOPLE?: SOMEWHAT DIFFICULT
6. BECOMING EASILY ANNOYED OR IRRITABLE: NOT AT ALL
4. TROUBLE RELAXING: SEVERAL DAYS
IF YOU CHECKED OFF ANY PROBLEMS ON THIS QUESTIONNAIRE, HOW DIFFICULT HAVE THESE PROBLEMS MADE IT FOR YOU TO DO YOUR WORK, TAKE CARE OF THINGS AT HOME, OR GET ALONG WITH OTHER PEOPLE: SOMEWHAT DIFFICULT
7. FEELING AFRAID AS IF SOMETHING AWFUL MIGHT HAPPEN: NOT AT ALL

## 2024-10-15 ASSESSMENT — ASTHMA QUESTIONNAIRES
QUESTION_5 LAST FOUR WEEKS HOW WOULD YOU RATE YOUR ASTHMA CONTROL: SOMEWHAT CONTROLLED
QUESTION_2 LAST FOUR WEEKS HOW OFTEN HAVE YOU HAD SHORTNESS OF BREATH: ONCE OR TWICE A WEEK
QUESTION_4 LAST FOUR WEEKS HOW OFTEN HAVE YOU USED YOUR RESCUE INHALER OR NEBULIZER MEDICATION (SUCH AS ALBUTEROL): TWO OR THREE TIMES PER WEEK
ACT_TOTALSCORE: 19
QUESTION_1 LAST FOUR WEEKS HOW MUCH OF THE TIME DID YOUR ASTHMA KEEP YOU FROM GETTING AS MUCH DONE AT WORK, SCHOOL OR AT HOME: NONE OF THE TIME
ACT_TOTALSCORE: 19
QUESTION_3 LAST FOUR WEEKS HOW OFTEN DID YOUR ASTHMA SYMPTOMS (WHEEZING, COUGHING, SHORTNESS OF BREATH, CHEST TIGHTNESS OR PAIN) WAKE YOU UP AT NIGHT OR EARLIER THAN USUAL IN THE MORNING: ONCE OR TWICE

## 2024-10-15 ASSESSMENT — PAIN SCALES - GENERAL: PAINLEVEL: NO PAIN (0)

## 2024-10-15 NOTE — PROGRESS NOTES
Assessment/Plan:    Dermatitis  Dermatitis involving both palmar and plantar aspect.  Suture removal following recent biopsy showing dermatitis as well as question of infection currently being treated by dermatologist with ciprofloxacin 500 mg twice daily x 10 days.    Type 2 diabetes mellitus with hyperglycemia, without long-term current use of insulin (H)  Type 2 diabetes reviewed.  Scheduled follow-up in 2025 as noted.               Subjective:    Silviano Collins is seen today for follow-up assessment.  Needs suture removal.  Recent left palm biopsy showing dermatitis as well as evidence for infection now treated with ciprofloxacin 500 mg twice daily x 10 days.  He has completed 2 days worth.  Told to discontinue steroid application to these areas.  Underlying history of type 2 diabetes.       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)   Hobbies:  boating, jetskiing, water skiing       Past Surgical History:   Procedure Laterality Date    HC TOOTH EXTRACTION W/FORCEP      4 wisdom teeth removed withotu complications        Family History   Problem Relation Age of Onset    Diabetes Mother         D: 40 pneumonia,  Type I DM    Family History Negative Father     Family History Negative Brother         Past Medical History:   Diagnosis Date    Allergic rhinitis due to other allergen     seasonal allergies (spring and fall)    Mild intermittent asthma     exercise, seasonal, associated with URIs        Social History     Tobacco Use    Smoking status: Former    Smokeless tobacco: Never   Vaping Use    Vaping status: Never Used   Substance Use Topics    Alcohol use: Yes     Comment: 2x month    Drug use: Never        Current Outpatient Medications   Medication Sig Dispense Refill    albuterol  (PROAIR HFA/PROVENTIL HFA/VENTOLIN HFA) 108 (90 Base) MCG/ACT inhaler Inhale 2 puffs into the lungs every 6 hours 36 g 3    alcohol swab prep pads Use to swab area of injection/dominick as directed. 100 each 0    azelastine (ASTELIN) 0.1 % nasal spray Spray 2 sprays into both nostrils 2 times daily 30 mL 11    azelastine (OPTIVAR) 0.05 % ophthalmic solution Place 1 drop into both eyes 2 times daily 6 mL 5    blood glucose (NO BRAND SPECIFIED) test strip Use to test blood sugar once daily or as directed. To accompany: Blood Glucose Monitor Brands: per insurance. 100 strip 0    blood glucose calibration (NO BRAND SPECIFIED) solution To accompany: Blood Glucose Monitor Brands: per insurance. 1 each 0    blood glucose monitoring (NO BRAND SPECIFIED) meter device kit Use to test blood sugar one time daily or as directed. Preferred blood glucose meter OR supplies to accompany: Blood Glucose Monitor Brands: per insurance. 1 kit 0    ciprofloxacin (CIPRO) 500 MG tablet Take 1 tablet (500 mg) by mouth 2 times daily. 20 tablet 0    clobetasol (TEMOVATE) 0.05 % external ointment Apply sparingly to affected area two times daily as needed but not more than 14 days in a row. Spare face, armpits, neck, and groin. 60 g 3    Continuous Blood Gluc  (DEXCOM G6 ) EVA Use to read blood sugars as per 's instructions. 1 each 0    Continuous Glucose Sensor (FREESTYLE EKATERINA 3 SENSOR) [facility] 1 each every 14 days 2 each 4    fluticasone (FLONASE) 50 MCG/ACT nasal spray Spray 2 sprays into both nostrils daily. 48 g 1    fluticasone-salmeterol (ADVAIR) 250-50 MCG/ACT inhaler Inhale 1 puff into the lungs every 12 hours 60 each 5    ketotifen fumarate 0.035% 0.035 % SOLN ophthalmic solution Place 1 drop into both eyes every 12 hours 5 mL 0    levocetirizine (XYZAL) 5 MG tablet Take 1 tablet (5 mg) by mouth 2 times daily 60 tablet 3    loratadine-pseudoePHEDrine (SM ALLERGY RELIEF D)  MG 24 hr tablet Take 1 tablet by  "mouth daily 30 tablet 1    metFORMIN (GLUCOPHAGE XR) 500 MG 24 hr tablet Take 2 tablets (1,000 mg) by mouth 2 times daily (with meals). 360 tablet 0    modafinil (PROVIGIL) 200 MG tablet Take 1 tablet (200 mg) by mouth daily Take 1 tab every morning for sleepiness due to KRISTEL 30 tablet 0    montelukast (SINGULAIR) 10 MG tablet Take 1 tablet (10 mg) by mouth at bedtime 90 tablet 3    MULTI-VITAMIN OR TABS       rosuvastatin (CRESTOR) 10 MG tablet Take 1 tablet (10 mg) by mouth daily 30 tablet 5    sertraline (ZOLOFT) 50 MG tablet Take 1 tablet (50 mg) by mouth daily as needed (sex) Take one tablet 8-12 hours before sex 30 tablet 1    sildenafil (VIAGRA) 25 MG tablet Take 1 tablet (25 mg) by mouth daily as needed 90 tablet 0    thin (NO BRAND SPECIFIED) lancets Use with lanceting device. To accompany: Blood Glucose Monitor Brands: per insurance. 100 each 0    tirzepatide (MOUNJARO) 7.5 MG/0.5ML pen Inject 7.5 mg subcutaneously every 7 days 2 mL 2    triamcinolone (KENALOG) 0.1 % external ointment Apply topically 2 times daily 30 g 1    valACYclovir (VALTREX) 500 MG tablet TAKE 1 TABLET BY MOUTH 2 TIMES DAILY 30 tablet 0          Objective:    Vitals:    10/15/24 0915   BP: 136/80   Resp: 15   Temp: 97.2  F (36.2  C)   SpO2: 97%   Weight: 93.9 kg (207 lb)   Height: 1.784 m (5' 10.25\")      Body mass index is 29.49 kg/m .    Alert.  No apparent distress.  Suture removal x 2 from left palm.  No signs of secondary infection.  No purulent drainage.  No wound dehiscence.  Plantar right foot changes question tinea versus other.      This note has been dictated using voice recognition software and as a result may contain minor grammatical errors and unintended word substitutions.       Answers submitted by the patient for this visit:  Patient Health Questionnaire (Submitted on 10/15/2024)  If you checked off any problems, how difficult have these problems made it for you to do your work, take care of things at home, or get " along with other people?: Not difficult at all  PHQ9 TOTAL SCORE: 2  Patient Health Questionnaire (G7) (Submitted on 10/15/2024)  SOLIS 7 TOTAL SCORE: 4  General Questionnaire (Submitted on 10/15/2024)  Chief Complaint: Chronic problems general questions HPI Form  What is the reason for your visit today? : stitches removed  How many servings of fruits and vegetables do you eat daily?: 0-1  On average, how many sweetened beverages do you drink each day (Examples: soda, juice, sweet tea, etc.  Do NOT count diet or artificially sweetened beverages)?: 1  How many minutes a day do you exercise enough to make your heart beat faster?: 9 or less  How many days a week do you exercise enough to make your heart beat faster?: 3 or less  How many days per week do you miss taking your medication?: 0

## 2024-10-17 DIAGNOSIS — E11.9 CONTROLLED TYPE 2 DIABETES MELLITUS WITHOUT COMPLICATION, WITHOUT LONG-TERM CURRENT USE OF INSULIN (H): ICD-10-CM

## 2024-10-17 RX ORDER — TIRZEPATIDE 7.5 MG/.5ML
INJECTION, SOLUTION SUBCUTANEOUS
Qty: 2 ML | Refills: 2 | Status: SHIPPED | OUTPATIENT
Start: 2024-10-17

## 2024-11-14 ENCOUNTER — MYC REFILL (OUTPATIENT)
Dept: FAMILY MEDICINE | Facility: CLINIC | Age: 49
End: 2024-11-14
Payer: COMMERCIAL

## 2024-11-14 ENCOUNTER — MYC REFILL (OUTPATIENT)
Dept: EDUCATION SERVICES | Facility: CLINIC | Age: 49
End: 2024-11-14
Payer: COMMERCIAL

## 2024-11-14 ENCOUNTER — MYC REFILL (OUTPATIENT)
Dept: ALLERGY | Facility: CLINIC | Age: 49
End: 2024-11-14
Payer: COMMERCIAL

## 2024-11-14 DIAGNOSIS — H10.13 ALLERGIC CONJUNCTIVITIS, BILATERAL: ICD-10-CM

## 2024-11-14 DIAGNOSIS — E11.9 CONTROLLED TYPE 2 DIABETES MELLITUS WITHOUT COMPLICATION, WITHOUT LONG-TERM CURRENT USE OF INSULIN (H): ICD-10-CM

## 2024-11-14 DIAGNOSIS — J45.31 MILD PERSISTENT ASTHMA WITH EXACERBATION: ICD-10-CM

## 2024-11-14 DIAGNOSIS — J30.89 ALLERGIC RHINITIS DUE TO DUST MITE: ICD-10-CM

## 2024-11-14 DIAGNOSIS — J45.20 MILD INTERMITTENT ASTHMA, UNSPECIFIED WHETHER COMPLICATED: ICD-10-CM

## 2024-11-14 DIAGNOSIS — E78.5 HYPERLIPIDEMIA, UNSPECIFIED HYPERLIPIDEMIA TYPE: ICD-10-CM

## 2024-11-14 DIAGNOSIS — Z87.09 HISTORY OF ALLERGIC RHINITIS: ICD-10-CM

## 2024-11-14 DIAGNOSIS — J32.9 CHRONIC SINUSITIS, UNSPECIFIED LOCATION: ICD-10-CM

## 2024-11-14 RX ORDER — TIRZEPATIDE 7.5 MG/.5ML
INJECTION, SOLUTION SUBCUTANEOUS
Qty: 2 ML | Refills: 2 | OUTPATIENT
Start: 2024-11-14

## 2024-11-14 RX ORDER — MONTELUKAST SODIUM 10 MG/1
10 TABLET ORAL AT BEDTIME
Qty: 90 TABLET | Refills: 3 | Status: SHIPPED | OUTPATIENT
Start: 2024-11-14

## 2024-11-14 RX ORDER — ACYCLOVIR 800 MG/1
1 TABLET ORAL
Qty: 2 EACH | Refills: 2 | Status: SHIPPED | OUTPATIENT
Start: 2024-11-14

## 2024-11-14 RX ORDER — AZELASTINE HYDROCHLORIDE 0.5 MG/ML
1 SOLUTION/ DROPS OPHTHALMIC 2 TIMES DAILY
Qty: 6 ML | Refills: 5 | OUTPATIENT
Start: 2024-11-14

## 2024-11-14 RX ORDER — ROSUVASTATIN CALCIUM 10 MG/1
10 TABLET, COATED ORAL DAILY
Qty: 90 TABLET | Refills: 2 | Status: SHIPPED | OUTPATIENT
Start: 2024-11-14

## 2024-11-14 RX ORDER — FLUTICASONE PROPIONATE AND SALMETEROL 250; 50 UG/1; UG/1
1 POWDER RESPIRATORY (INHALATION) EVERY 12 HOURS
Qty: 60 EACH | Refills: 5 | Status: SHIPPED | OUTPATIENT
Start: 2024-11-14

## 2024-11-14 RX ORDER — LEVOCETIRIZINE DIHYDROCHLORIDE 5 MG/1
5 TABLET, FILM COATED ORAL 2 TIMES DAILY
Qty: 60 TABLET | Refills: 3 | OUTPATIENT
Start: 2024-11-14

## 2024-11-15 RX ORDER — AZELASTINE 1 MG/ML
2 SPRAY, METERED NASAL 2 TIMES DAILY
Qty: 30 ML | Refills: 11 | OUTPATIENT
Start: 2024-11-15

## 2024-11-15 RX ORDER — ALBUTEROL SULFATE 90 UG/1
2 INHALANT RESPIRATORY (INHALATION) EVERY 6 HOURS
Qty: 36 G | Refills: 3 | OUTPATIENT
Start: 2024-11-15

## 2024-11-15 RX ORDER — FLUTICASONE PROPIONATE 50 MCG
2 SPRAY, SUSPENSION (ML) NASAL DAILY
Qty: 48 G | Refills: 1 | OUTPATIENT
Start: 2024-11-15

## 2024-11-26 DIAGNOSIS — E11.9 TYPE 2 DIABETES MELLITUS WITHOUT COMPLICATION, WITHOUT LONG-TERM CURRENT USE OF INSULIN (H): ICD-10-CM

## 2024-11-26 RX ORDER — METFORMIN HYDROCHLORIDE 500 MG/1
1000 TABLET, EXTENDED RELEASE ORAL 2 TIMES DAILY WITH MEALS
Qty: 360 TABLET | Refills: 0 | Status: SHIPPED | OUTPATIENT
Start: 2024-11-26

## 2024-12-01 ENCOUNTER — MYC MEDICAL ADVICE (OUTPATIENT)
Dept: FAMILY MEDICINE | Facility: CLINIC | Age: 49
End: 2024-12-01
Payer: COMMERCIAL

## 2024-12-01 DIAGNOSIS — Z12.11 COLON CANCER SCREENING: Primary | ICD-10-CM

## 2024-12-08 ENCOUNTER — MYC MEDICAL ADVICE (OUTPATIENT)
Dept: DERMATOLOGY | Facility: CLINIC | Age: 49
End: 2024-12-08
Payer: COMMERCIAL

## 2024-12-10 NOTE — TELEPHONE ENCOUNTER
Pt called and scheduled for telephone visit with Curtis on 12/18/24.    Nury Cantrell RN on 12/10/2024 at 10:01 AM

## 2024-12-18 ENCOUNTER — VIRTUAL VISIT (OUTPATIENT)
Dept: DERMATOLOGY | Facility: CLINIC | Age: 49
End: 2024-12-18
Payer: COMMERCIAL

## 2024-12-18 DIAGNOSIS — B95.8 STAPH SKIN INFECTION: ICD-10-CM

## 2024-12-18 DIAGNOSIS — L30.9 DERMATITIS: ICD-10-CM

## 2024-12-18 DIAGNOSIS — L08.9 STAPH SKIN INFECTION: ICD-10-CM

## 2024-12-18 RX ORDER — CLOBETASOL PROPIONATE 0.5 MG/G
OINTMENT TOPICAL
Qty: 60 G | Refills: 3 | Status: SHIPPED | OUTPATIENT
Start: 2024-12-18

## 2024-12-18 RX ORDER — CIPROFLOXACIN 500 MG/1
500 TABLET, FILM COATED ORAL 2 TIMES DAILY
Qty: 40 TABLET | Refills: 0 | Status: SHIPPED | OUTPATIENT
Start: 2024-12-18

## 2024-12-18 NOTE — PROGRESS NOTES
[facility] Dermatology Note  Encounter Date: Dec 18, 2024  Telephone ([phone] ). Location of teledermatologist: [facility]. Length of call: 10min    Dermatology Problem List:  Impetiginous Subacute spongiotic dermatitis  - S/p Punch biopsy - L palm, 10/2/24  - bacterial culture positive for Massila timonae and Staph pseudintermedius/intermedius on 10/2/24  - current tx: Cipro 500mg BID, clobetasol, emollients  - previous tx: OTC antifungal cream, OTC antibacterial ointment    PMHx: asthma, allergic rhinitis  ____________________________________________    Assessment & Plan:     # Subacute spongiotic dermatitis/nummular - impetiginous previously. Now with repeat flare. Unclear if it is infected again. Will do repeat course of cipro empirically as this nearly resolved his rash before  - bacterial culture positive for massila timonae and staph pseudintermedius/intermedius on 10/2/24, reviewed  - reviewed [punch biopsy results  - restart Ciprofloxacin 500mg po BID - will extend course for 20 days  - restart clobetasol ointment to AA on the patches on the bilateral lower legs, follow this with a thick bland emollient such as cerave cream, cetaphil cream and vanicream  - avoid lotions  - continue to moisturizer BID going forward, even if rash appears to have resolved  - avoid use of clobetasol on genitals, face, neck and axillae    Procedures Performed:    None    Follow-up: 3 month(s) in-person prn, or earlier for new or changing lesions    Staff and Scribe:     Scribe Disclosure:   I, ZINA SHANKS, am serving as a scribe; to document services personally performed by China Acevedo PA-C -based on data collection and the provider's statements to me.     Provider Disclosure:  I agree with above History, Review of Systems, Physical exam and Plan.  I have reviewed the content of the documentation and have edited it as needed. I have personally performed the services  documented here and the documentation accurately represents those services and the decisions I have made.      Electronically signed by:    All risks, benefits and alternatives were discussed with patient.  Patient is in agreement and understands the assessment and plan.  All questions were answered.    China Acevedo PA-C, MPAS  [facility] [place]: Phone: [phone], Fax: [phone]  [facility]: Phone: [phone],  Fax: [phone]  [facility]: Phone: [phone], Fax: [phone]  ____________________________________________    CC: No chief complaint on file.    HPI:  Mr. Silviano Collins is a(n) 49 year old male who presents today as a return patient for dermatitis follow up.  He notes after taking Cipro in October his rash nearly cleared up entirely. About 2 days later however it started coming back. Tells me he is very itchy. Has been using clobetasol on it as well as various creams and lotions without benefit.     Patient is otherwise feeling well, without additional skin concerns.    Labs Reviewed:  Pathology report 10/2/24  Final Diagnosis   Left palm:  - Subacute spongiotic dermatitis     Physical Exam:  Vitals: There were no vitals taken for this visit.  SKIN: Teledermatology photos were reviewed; image quality and interpretability: acceptable. Image date: 12/10/24.  - R lower leg/ankle - erythematous scaly pink patches and plaques  - No other lesions of concern on areas examined.             Medications:  Current Outpatient Medications   Medication Sig Dispense Refill    albuterol (PROAIR HFA/PROVENTIL HFA/VENTOLIN HFA) 108 (90 Base) MCG/ACT inhaler Inhale 2 puffs into the lungs every 6 hours 36 g 3    alcohol swab prep pads Use to swab area of injection/dominick as directed. 100 each 0    azelastine (ASTELIN) 0.1 % nasal spray Spray 2 sprays into both nostrils 2 times daily 30 mL 11     azelastine (OPTIVAR) 0.05 % ophthalmic solution Place 1 drop into both eyes 2 times daily 6 mL 5    blood glucose (NO BRAND SPECIFIED) test strip Use to test blood sugar once daily or as directed. To accompany: Blood Glucose Monitor Brands: per insurance. 100 strip 0    blood glucose calibration (NO BRAND SPECIFIED) solution To accompany: Blood Glucose Monitor Brands: per insurance. 1 each 0    blood glucose monitoring (NO BRAND SPECIFIED) meter device kit Use to test blood sugar one time daily or as directed. Preferred blood glucose meter OR supplies to accompany: Blood Glucose Monitor Brands: per insurance. 1 kit 0    ciprofloxacin (CIPRO) 500 MG tablet Take 1 tablet (500 mg) by mouth 2 times daily. 20 tablet 0    clobetasol (TEMOVATE) 0.05 % external ointment Apply sparingly to affected area two times daily as needed but not more than 14 days in a row. Spare face, armpits, neck, and groin. 60 g 3    Continuous Blood Gluc  (DEXCOM G6 ) EVA Use to read blood sugars as per 's instructions. 1 each 0    Continuous Glucose Sensor (FREESTYLE EKATERINA 3 SENSOR) MISC 1 each every 14 days. 2 each 2    fluticasone (FLONASE) 50 MCG/ACT nasal spray Spray 2 sprays into both nostrils daily. 48 g 1    fluticasone-salmeterol (ADVAIR) 250-50 MCG/ACT inhaler Inhale 1 puff into the lungs every 12 hours. 60 each 5    ketotifen fumarate 0.035% 0.035 % SOLN ophthalmic solution Place 1 drop into both eyes every 12 hours 5 mL 0    levocetirizine (XYZAL) 5 MG tablet Take 1 tablet (5 mg) by mouth 2 times daily 60 tablet 3    loratadine-pseudoePHEDrine (SM ALLERGY RELIEF D)  MG 24 hr tablet Take 1 tablet by mouth daily 30 tablet 1    metFORMIN (GLUCOPHAGE XR) 500 MG 24 hr tablet Take 2 tablets (1,000 mg) by mouth 2 times daily (with meals). 360 tablet 0    modafinil (PROVIGIL) 200 MG tablet Take 1 tablet (200 mg) by mouth daily Take 1 tab every morning for sleepiness due to KRISTEL 30 tablet 0    montelukast  (SINGULAIR) 10 MG tablet Take 1 tablet (10 mg) by mouth at bedtime. 90 tablet 3    MOUNJARO 7.5 MG/0.5ML SOAJ INJECT 7.5 MG SUBCUTANEOUSLY EVERY 7 DAYS 2 mL 2    MULTI-VITAMIN OR TABS       rosuvastatin (CRESTOR) 10 MG tablet Take 1 tablet (10 mg) by mouth daily. 90 tablet 2    sertraline (ZOLOFT) 50 MG tablet Take 1 tablet (50 mg) by mouth daily as needed (sex) Take one tablet 8-12 hours before sex 30 tablet 1    sildenafil (VIAGRA) 25 MG tablet Take 1 tablet (25 mg) by mouth daily as needed 90 tablet 0    thin (NO BRAND SPECIFIED) lancets Use with lanceting device. To accompany: Blood Glucose Monitor Brands: per insurance. 100 each 0    triamcinolone (KENALOG) 0.1 % external ointment Apply topically 2 times daily 30 g 1    valACYclovir (VALTREX) 500 MG tablet TAKE 1 TABLET BY MOUTH 2 TIMES DAILY 30 tablet 0     No current facility-administered medications for this visit.      Past Medical/Surgical History:   Patient Active Problem List   Diagnosis    Allergic rhinitis due to pollen    Mild intermittent asthma    Type 2 diabetes mellitus with hyperglycemia, without long-term current use of insulin (H)    Hyperlipidemia    Obesity    KRISTEL (obstructive sleep apnea)    Asthma    Vitamin D deficiency     Past Medical History:   Diagnosis Date    Allergic rhinitis due to other allergen     seasonal allergies (spring and fall)    Mild intermittent asthma     exercise, seasonal, associated with URIs       CC Referred Self, MD  No address on file on close of this encounter.

## 2024-12-18 NOTE — LETTER
12/18/2024      Silviano Collins  [address] [facility] [address]      Dear Colleague,    Thank you for referring your patient, Silviano Collins, to the [facility]. Please see a copy of my visit note below.    [facility] Dermatology Note  Encounter Date: Dec 18, 2024  Telephone ([phone] ). Location of teledermatologist: [facility]. Length of call: 10min    Dermatology Problem List:  Impetiginous Subacute spongiotic dermatitis  - S/p Punch biopsy - L palm, 10/2/24  - bacterial culture positive for Massila timonae and Staph pseudintermedius/intermedius on 10/2/24  - current tx: Cipro 500mg BID, clobetasol, emollients  - previous tx: OTC antifungal cream, OTC antibacterial ointment    PMHx: asthma, allergic rhinitis  ____________________________________________    Assessment & Plan:     # Subacute spongiotic dermatitis/nummular - impetiginous previously. Now with repeat flare. Unclear if it is infected again. Will do repeat course of cipro empirically as this nearly resolved his rash before  - bacterial culture positive for massila timonae and staph pseudintermedius/intermedius on 10/2/24, reviewed  - reviewed [punch biopsy results  - restart Ciprofloxacin 500mg po BID - will extend course for 20 days  - restart clobetasol ointment to AA on the patches on the bilateral lower legs, follow this with a thick bland emollient such as cerave cream, cetaphil cream and vanicream  - avoid lotions  - continue to moisturizer BID going forward, even if rash appears to have resolved  - avoid use of clobetasol on genitals, face, neck and axillae    Procedures Performed:    None    Follow-up: 3 month(s) in-person prn, or earlier for new or changing lesions    Staff and Scribe:     Scribe Disclosure:   I, ZNIA SHANKS, am serving as a scribe; to document services personally performed by China Acevedo PA-C -based on data collection and  the provider's statements to me.     Provider Disclosure:  I agree with above History, Review of Systems, Physical exam and Plan.  I have reviewed the content of the documentation and have edited it as needed. I have personally performed the services documented here and the documentation accurately represents those services and the decisions I have made.      Electronically signed by:    All risks, benefits and alternatives were discussed with patient.  Patient is in agreement and understands the assessment and plan.  All questions were answered.    China Acevedo PA-C, MPAS  [facility]: Phone: [phone], Fax: [phone]  [facility]: Phone: [phone],  Fax: [phone]  [facility]: Phone: [phone], Fax: [phone]  ____________________________________________    CC: No chief complaint on file.    HPI:  Mr. Silviano Collins is a(n) 49 year old male who presents today as a return patient for dermatitis follow up.  He notes after taking Cipro in October his rash nearly cleared up entirely. About 2 days later however it started coming back. Tells me he is very itchy. Has been using clobetasol on it as well as various creams and lotions without benefit.     Patient is otherwise feeling well, without additional skin concerns.    Labs Reviewed:  Pathology report 10/2/24  Final Diagnosis   Left palm:  - Subacute spongiotic dermatitis     Physical Exam:  Vitals: There were no vitals taken for this visit.  SKIN: Teledermatology photos were reviewed; image quality and interpretability: acceptable. Image date: 12/10/24.  - R lower leg/ankle - erythematous scaly pink patches and plaques  - No other lesions of concern on areas examined.             Medications:  Current Outpatient Medications   Medication Sig Dispense Refill     albuterol (PROAIR HFA/PROVENTIL HFA/VENTOLIN HFA) 108 (90 Base) MCG/ACT  inhaler Inhale 2 puffs into the lungs every 6 hours 36 g 3     alcohol swab prep pads Use to swab area of injection/dominick as directed. 100 each 0     azelastine (ASTELIN) 0.1 % nasal spray Spray 2 sprays into both nostrils 2 times daily 30 mL 11     azelastine (OPTIVAR) 0.05 % ophthalmic solution Place 1 drop into both eyes 2 times daily 6 mL 5     blood glucose (NO BRAND SPECIFIED) test strip Use to test blood sugar once daily or as directed. To accompany: Blood Glucose Monitor Brands: per insurance. 100 strip 0     blood glucose calibration (NO BRAND SPECIFIED) solution To accompany: Blood Glucose Monitor Brands: per insurance. 1 each 0     blood glucose monitoring (NO BRAND SPECIFIED) meter device kit Use to test blood sugar one time daily or as directed. Preferred blood glucose meter OR supplies to accompany: Blood Glucose Monitor Brands: per insurance. 1 kit 0     ciprofloxacin (CIPRO) 500 MG tablet Take 1 tablet (500 mg) by mouth 2 times daily. 20 tablet 0     clobetasol (TEMOVATE) 0.05 % external ointment Apply sparingly to affected area two times daily as needed but not more than 14 days in a row. Spare face, armpits, neck, and groin. 60 g 3     Continuous Blood Gluc  (DEXCOM G6 ) EVA Use to read blood sugars as per 's instructions. 1 each 0     Continuous Glucose Sensor (FREESTYLE EKATERINA 3 SENSOR) MISC 1 each every 14 days. 2 each 2     fluticasone (FLONASE) 50 MCG/ACT nasal spray Spray 2 sprays into both nostrils daily. 48 g 1     fluticasone-salmeterol (ADVAIR) 250-50 MCG/ACT inhaler Inhale 1 puff into the lungs every 12 hours. 60 each 5     ketotifen fumarate 0.035% 0.035 % SOLN ophthalmic solution Place 1 drop into both eyes every 12 hours 5 mL 0     levocetirizine (XYZAL) 5 MG tablet Take 1 tablet (5 mg) by mouth 2 times daily 60 tablet 3     loratadine-pseudoePHEDrine (SM ALLERGY RELIEF D)  MG 24 hr tablet Take 1 tablet by mouth daily 30 tablet 1     metFORMIN  (GLUCOPHAGE XR) 500 MG 24 hr tablet Take 2 tablets (1,000 mg) by mouth 2 times daily (with meals). 360 tablet 0     modafinil (PROVIGIL) 200 MG tablet Take 1 tablet (200 mg) by mouth daily Take 1 tab every morning for sleepiness due to KRISTEL 30 tablet 0     montelukast (SINGULAIR) 10 MG tablet Take 1 tablet (10 mg) by mouth at bedtime. 90 tablet 3     MOUNJARO 7.5 MG/0.5ML SOAJ INJECT 7.5 MG SUBCUTANEOUSLY EVERY 7 DAYS 2 mL 2     MULTI-VITAMIN OR TABS        rosuvastatin (CRESTOR) 10 MG tablet Take 1 tablet (10 mg) by mouth daily. 90 tablet 2     sertraline (ZOLOFT) 50 MG tablet Take 1 tablet (50 mg) by mouth daily as needed (sex) Take one tablet 8-12 hours before sex 30 tablet 1     sildenafil (VIAGRA) 25 MG tablet Take 1 tablet (25 mg) by mouth daily as needed 90 tablet 0     thin (NO BRAND SPECIFIED) lancets Use with lanceting device. To accompany: Blood Glucose Monitor Brands: per insurance. 100 each 0     triamcinolone (KENALOG) 0.1 % external ointment Apply topically 2 times daily 30 g 1     valACYclovir (VALTREX) 500 MG tablet TAKE 1 TABLET BY MOUTH 2 TIMES DAILY 30 tablet 0     No current facility-administered medications for this visit.      Past Medical/Surgical History:   Patient Active Problem List   Diagnosis     Allergic rhinitis due to pollen     Mild intermittent asthma     Type 2 diabetes mellitus with hyperglycemia, without long-term current use of insulin (H)     Hyperlipidemia     Obesity     KRISTEL (obstructive sleep apnea)     Asthma     Vitamin D deficiency     Past Medical History:   Diagnosis Date     Allergic rhinitis due to other allergen     seasonal allergies (spring and fall)     Mild intermittent asthma     exercise, seasonal, associated with URIs       CC Referred Self, MD  No address on file on close of this encounter.       Again, thank you for allowing me to participate in the care of your patient.        Sincerely,        China Acevedo PA-C

## 2024-12-18 NOTE — NURSING NOTE
Silviano Collins's goals for this visit include:   Chief Complaint   Patient presents with    Derm Problem     Dermatitis follow up. Things going well aside from rash on lower legs. Pt sent photo on 12/8/24, it looks the same as it did then. Creams have not helped get rid of the rash, but help with itching. Pt wants to see what else he can do to help clear rash.       He requests these members of his care team be copied on today's visit information:     PCP: Thompson Schmitz    Referring Provider:  Referred Self, MD  No address on file    There were no vitals taken for this visit.    Do you need any medication refills at today's visit? Yes    Teledermatology Nurse Call Patients:     Are you in the [facility] at the time of the encounter? yes    Today's visit will be billed to you and your insurance.    A teledermatology visit is not as thorough as an in-person visit and the quality of the photograph sent may not be of the same quality as that taken by the dermatology clinic.     Nathalie Perae MA on 12/18/2024 at 10:21 AM

## 2025-01-03 ENCOUNTER — TRANSFERRED RECORDS (OUTPATIENT)
Dept: MULTI SPECIALTY CLINIC | Facility: CLINIC | Age: 50
End: 2025-01-03

## 2025-01-03 LAB — RETINOPATHY: NORMAL

## 2025-01-04 ENCOUNTER — HEALTH MAINTENANCE LETTER (OUTPATIENT)
Age: 50
End: 2025-01-04

## 2025-01-05 ENCOUNTER — MYC MEDICAL ADVICE (OUTPATIENT)
Dept: FAMILY MEDICINE | Facility: CLINIC | Age: 50
End: 2025-01-05
Payer: COMMERCIAL

## 2025-01-05 ENCOUNTER — MYC REFILL (OUTPATIENT)
Dept: FAMILY MEDICINE | Facility: CLINIC | Age: 50
End: 2025-01-05
Payer: COMMERCIAL

## 2025-01-05 DIAGNOSIS — H10.13 ALLERGIC CONJUNCTIVITIS, BILATERAL: ICD-10-CM

## 2025-01-05 DIAGNOSIS — B00.1 COLD SORE: ICD-10-CM

## 2025-01-05 DIAGNOSIS — E11.9 CONTROLLED TYPE 2 DIABETES MELLITUS WITHOUT COMPLICATION, WITHOUT LONG-TERM CURRENT USE OF INSULIN (H): ICD-10-CM

## 2025-01-05 DIAGNOSIS — J45.31 MILD PERSISTENT ASTHMA WITH EXACERBATION: ICD-10-CM

## 2025-01-05 DIAGNOSIS — J30.89 ALLERGIC RHINITIS DUE TO DUST MITE: ICD-10-CM

## 2025-01-05 DIAGNOSIS — H91.93 BILATERAL HEARING LOSS, UNSPECIFIED HEARING LOSS TYPE: Primary | ICD-10-CM

## 2025-01-05 DIAGNOSIS — H90.6 MIXED CONDUCTIVE AND SENSORINEURAL HEARING LOSS OF BOTH EARS: ICD-10-CM

## 2025-01-06 RX ORDER — TIRZEPATIDE 7.5 MG/.5ML
7.5 INJECTION, SOLUTION SUBCUTANEOUS WEEKLY
Qty: 2 ML | Refills: 2 | Status: SHIPPED | OUTPATIENT
Start: 2025-01-06

## 2025-01-06 RX ORDER — VALACYCLOVIR HYDROCHLORIDE 500 MG/1
500 TABLET, FILM COATED ORAL 2 TIMES DAILY
Qty: 30 TABLET | Refills: 0 | Status: SHIPPED | OUTPATIENT
Start: 2025-01-06

## 2025-01-06 RX ORDER — AZELASTINE 1 MG/ML
2 SPRAY, METERED NASAL 2 TIMES DAILY
Qty: 30 ML | Refills: 5 | Status: SHIPPED | OUTPATIENT
Start: 2025-01-06

## 2025-01-06 RX ORDER — FLUTICASONE PROPIONATE AND SALMETEROL 250; 50 UG/1; UG/1
1 POWDER RESPIRATORY (INHALATION) EVERY 12 HOURS
Qty: 60 EACH | Refills: 5 | Status: SHIPPED | OUTPATIENT
Start: 2025-01-06

## 2025-01-09 NOTE — TELEPHONE ENCOUNTER
"Would you please clarify with patient what exactly he is requesting regarding \"prescription for duller for my allergies\"?  "

## 2025-01-13 ENCOUNTER — VIRTUAL VISIT (OUTPATIENT)
Dept: SLEEP MEDICINE | Facility: CLINIC | Age: 50
End: 2025-01-13
Payer: COMMERCIAL

## 2025-01-13 VITALS — WEIGHT: 199 LBS | HEIGHT: 71 IN | BODY MASS INDEX: 27.86 KG/M2

## 2025-01-13 DIAGNOSIS — G47.33 OBSTRUCTIVE SLEEP APNEA: Primary | ICD-10-CM

## 2025-01-13 PROCEDURE — 98007 SYNCH AUDIO-VIDEO EST HI 40: CPT | Performed by: PHYSICIAN ASSISTANT

## 2025-01-13 ASSESSMENT — PAIN SCALES - GENERAL: PAINLEVEL_OUTOF10: NO PAIN (0)

## 2025-01-13 ASSESSMENT — SLEEP AND FATIGUE QUESTIONNAIRES
HOW LIKELY ARE YOU TO NOD OFF OR FALL ASLEEP WHILE SITTING AND READING: MODERATE CHANCE OF DOZING
HOW LIKELY ARE YOU TO NOD OFF OR FALL ASLEEP IN A CAR, WHILE STOPPED FOR A FEW MINUTES IN TRAFFIC: SLIGHT CHANCE OF DOZING
HOW LIKELY ARE YOU TO NOD OFF OR FALL ASLEEP WHILE LYING DOWN TO REST IN THE AFTERNOON WHEN CIRCUMSTANCES PERMIT: MODERATE CHANCE OF DOZING
HOW LIKELY ARE YOU TO NOD OFF OR FALL ASLEEP WHILE SITTING INACTIVE IN A PUBLIC PLACE: MODERATE CHANCE OF DOZING
HOW LIKELY ARE YOU TO NOD OFF OR FALL ASLEEP WHILE SITTING AND TALKING TO SOMEONE: WOULD NEVER DOZE
HOW LIKELY ARE YOU TO NOD OFF OR FALL ASLEEP WHILE WATCHING TV: HIGH CHANCE OF DOZING
HOW LIKELY ARE YOU TO NOD OFF OR FALL ASLEEP WHILE SITTING QUIETLY AFTER LUNCH WITHOUT ALCOHOL: MODERATE CHANCE OF DOZING
HOW LIKELY ARE YOU TO NOD OFF OR FALL ASLEEP WHEN YOU ARE A PASSENGER IN A CAR FOR AN HOUR WITHOUT A BREAK: MODERATE CHANCE OF DOZING

## 2025-01-13 NOTE — NURSING NOTE
Current patient location: [address] [place] [id number]    Is the patient currently in the state of [place]? YES    Visit mode: VIDEO    If the visit is dropped, the patient can be reconnected by:VIDEO VISIT: Text to cell phone:   Telephone Information:   Mobile [phone]       Will anyone else be joining the visit? NO  (If patient encounters technical issues they should call [phone]    Are changes needed to the allergy or medication list? No    Are refills needed on medications prescribed by this physician? NO    Rooming Documentation:  Unable to complete questionnaire(s) due to time    Reason for visit: RECHECK    Meghana GARCIA

## 2025-01-13 NOTE — PROGRESS NOTES
Added Note: Provider Note Read Only    Virtual Visit Details    Type of service:  Video Visit   Start Time: 10:03 AM   End Time: 10:34 AM    Originating Location (pt. Location): Home    Distant Location (provider location):  Off-site  Platform used for Video Visit: [facility]

## 2025-01-28 ENCOUNTER — OFFICE VISIT (OUTPATIENT)
Dept: FAMILY MEDICINE | Facility: CLINIC | Age: 50
End: 2025-01-28
Payer: COMMERCIAL

## 2025-01-28 VITALS
RESPIRATION RATE: 16 BRPM | HEART RATE: 90 BPM | OXYGEN SATURATION: 95 % | WEIGHT: 210 LBS | DIASTOLIC BLOOD PRESSURE: 70 MMHG | HEIGHT: 71 IN | BODY MASS INDEX: 29.4 KG/M2 | SYSTOLIC BLOOD PRESSURE: 110 MMHG | TEMPERATURE: 98 F

## 2025-01-28 DIAGNOSIS — E78.5 HYPERLIPIDEMIA, UNSPECIFIED HYPERLIPIDEMIA TYPE: ICD-10-CM

## 2025-01-28 DIAGNOSIS — E11.65 TYPE 2 DIABETES MELLITUS WITH HYPERGLYCEMIA, WITHOUT LONG-TERM CURRENT USE OF INSULIN (H): Primary | ICD-10-CM

## 2025-01-28 DIAGNOSIS — L30.9 DERMATITIS: ICD-10-CM

## 2025-01-28 DIAGNOSIS — E66.811 CLASS 1 OBESITY WITH SERIOUS COMORBIDITY AND BODY MASS INDEX (BMI) OF 30.0 TO 30.9 IN ADULT, UNSPECIFIED OBESITY TYPE: Chronic | ICD-10-CM

## 2025-01-28 LAB
EST. AVERAGE GLUCOSE BLD GHB EST-MCNC: 126 MG/DL
HBA1C MFR BLD: 6 % (ref 0–5.6)
HOLD SPECIMEN: NORMAL
HOLD SPECIMEN: NORMAL

## 2025-01-28 PROCEDURE — 80048 BASIC METABOLIC PNL TOTAL CA: CPT | Performed by: FAMILY MEDICINE

## 2025-01-28 PROCEDURE — 36415 COLL VENOUS BLD VENIPUNCTURE: CPT | Performed by: FAMILY MEDICINE

## 2025-01-28 PROCEDURE — 83036 HEMOGLOBIN GLYCOSYLATED A1C: CPT | Performed by: FAMILY MEDICINE

## 2025-01-28 PROCEDURE — G2211 COMPLEX E/M VISIT ADD ON: HCPCS | Performed by: FAMILY MEDICINE

## 2025-01-28 PROCEDURE — 99214 OFFICE O/P EST MOD 30 MIN: CPT | Performed by: FAMILY MEDICINE

## 2025-01-28 ASSESSMENT — ASTHMA QUESTIONNAIRES
QUESTION_4 LAST FOUR WEEKS HOW OFTEN HAVE YOU USED YOUR RESCUE INHALER OR NEBULIZER MEDICATION (SUCH AS ALBUTEROL): TWO OR THREE TIMES PER WEEK
ACT_TOTALSCORE: 17
QUESTION_5 LAST FOUR WEEKS HOW WOULD YOU RATE YOUR ASTHMA CONTROL: SOMEWHAT CONTROLLED
ACT_TOTALSCORE: 17
QUESTION_2 LAST FOUR WEEKS HOW OFTEN HAVE YOU HAD SHORTNESS OF BREATH: ONCE OR TWICE A WEEK
QUESTION_3 LAST FOUR WEEKS HOW OFTEN DID YOUR ASTHMA SYMPTOMS (WHEEZING, COUGHING, SHORTNESS OF BREATH, CHEST TIGHTNESS OR PAIN) WAKE YOU UP AT NIGHT OR EARLIER THAN USUAL IN THE MORNING: ONCE OR TWICE
QUESTION_1 LAST FOUR WEEKS HOW MUCH OF THE TIME DID YOUR ASTHMA KEEP YOU FROM GETTING AS MUCH DONE AT WORK, SCHOOL OR AT HOME: SOME OF THE TIME

## 2025-01-28 ASSESSMENT — PAIN SCALES - GENERAL: PAINLEVEL_OUTOF10: MODERATE PAIN (4)

## 2025-01-28 NOTE — PROGRESS NOTES
Assessment/Plan:    Type 2 diabetes mellitus with hyperglycemia, without long-term current use of insulin (H)  2 diabetes with good control with A1c decreasing from 6.2% to 6.0% despite dietary indiscretion and 3 pound weight gain over the holidays.  Utilizing tirzepatide 7.5 mg weekly plus metformin  mg which she is only using 2 tablets in the morning.  Will further increase tirzepatide to 10 mg weekly dose and reassess at follow-up in about 4 months.  Diabetic eye exam to be completed.  No history of peripheral neuropathy.  Microalbumin screen normal April 24, 2024 and will repeat at follow-up.  - Adult Eye  Referral  - Tirzepatide 10 MG/0.5ML SOAJ  Dispense: 2 mL; Refill: 2  - Basic metabolic panel    Hyperlipidemia, unspecified hyperlipidemia type  Patient had stopped rosuvastatin 10 mg daily recently.  Had shown significant improvement in cholesterol management on September 6, 2024 while on medicine.  Stopped a couple weeks ago because he thought perhaps this was causing some dermatitis but uncertain.  Okay to resume 10 mg rosuvastatin dosing and reassess at follow-up in 4 months.    Dermatitis  Following with dermatologist.  Had seen MARISOL Bassett.  Diagnosed with impetiginous subacute spongiotic dermatitis.  Treatment with ciprofloxacin, clobetasol and emollient.  Previously had tried antifungal cream as well as OTC antibacterial ointment.  Persistent concern noted involving plantar aspect right foot and did instruct patient to follow-up with dermatology clinic for further evaluation.    Class 1 obesity with serious comorbidity and body mass index (BMI) of 30.0 to 30.9 in adult, unspecified obesity type  As above, increase tirzepatide from 7.5 mg weekly instead up to 10 mg weekly.  - Tirzepatide 10 MG/0.5ML SOAJ  Dispense: 2 mL; Refill: 2               Subjective:    Silviano Collins is seen today for follow-up assessment.  Type 2 diabetes reviewed.  A1c as high as 10.6% December 6,  2023.  Since this time has been on GLP-1 agonist and has increased tirzepatide to 7.5 mg weekly which she is tolerating.  Has had significant improvement in A1c is down to 6.2% September 6, 2024.  Hyperlipidemia managed with rosuvastatin 10 mg daily but due to recent skin concerns and ongoing difficulties with management did discontinue a couple weeks ago.  Has not seen significant change in course of dermatitis management.  Has worked with MARISOL Bassett as noted.  Comprehensive review of systems as above otherwise all negative.  Otherwise denies recent fevers or chills.  No cough.  Comprehensive review of systems as above otherwise all negative.      # Subacute spongiotic dermatitis/nummular - impetiginous previously. Now with repeat flare. Unclear if it is infected again. Will do repeat course of cipro empirically as this nearly resolved his rash before  - bacterial culture positive for massila timonae and staph pseudintermedius/intermedius on 10/2/24, reviewed  - reviewed [punch biopsy results  - restart Ciprofloxacin 500mg po BID - will extend course for 20 days  - restart clobetasol ointment to AA on the patches on the bilateral lower legs, follow this with a thick bland emollient such as cerave cream, cetaphil cream and vanicream  - avoid lotions  - continue to moisturizer BID going forward, even if rash appears to have resolved  - avoid use of clobetasol on genitals, face, neck and axillae          Past Surgical History:   Procedure Laterality Date    HC TOOTH EXTRACTION W/FORCEP  1995    4 wisdom teeth removed withotu complications        Family History   Problem Relation Age of Onset    Diabetes Mother         D: 40 pneumonia,  Type I DM    Family History Negative Father     Family History Negative Brother         Past Medical History:   Diagnosis Date    Allergic rhinitis due to other allergen     seasonal allergies (spring and fall)    Mild intermittent asthma     exercise, seasonal, associated with  URIs        Social History     Tobacco Use    Smoking status: Former     Passive exposure: Past    Smokeless tobacco: Never   Vaping Use    Vaping status: Never Used   Substance Use Topics    Alcohol use: Yes     Comment: 2x month    Drug use: Never        Current Outpatient Medications   Medication Sig Dispense Refill    albuterol (PROAIR HFA/PROVENTIL HFA/VENTOLIN HFA) 108 (90 Base) MCG/ACT inhaler Inhale 2 puffs into the lungs every 6 hours 36 g 3    alcohol swab prep pads Use to swab area of injection/dominick as directed. 100 each 0    azelastine (ASTELIN) 0.1 % nasal spray Spray 2 sprays into both nostrils 2 times daily. 30 mL 5    azelastine (OPTIVAR) 0.05 % ophthalmic solution Place 1 drop into both eyes 2 times daily 6 mL 5    blood glucose (NO BRAND SPECIFIED) test strip Use to test blood sugar once daily or as directed. To accompany: Blood Glucose Monitor Brands: per insurance. 100 strip 0    blood glucose calibration (NO BRAND SPECIFIED) solution To accompany: Blood Glucose Monitor Brands: per insurance. 1 each 0    blood glucose monitoring (NO BRAND SPECIFIED) meter device kit Use to test blood sugar one time daily or as directed. Preferred blood glucose meter OR supplies to accompany: Blood Glucose Monitor Brands: per insurance. 1 kit 0    ciprofloxacin (CIPRO) 500 MG tablet Take 1 tablet (500 mg) by mouth 2 times daily. 40 tablet 0    clobetasol (TEMOVATE) 0.05 % external ointment Apply sparingly to affected area two times daily as needed but not more than 14 days in a row. Spare face, armpits, neck, and groin. 60 g 3    Continuous Blood Gluc  (DEXCOM G6 ) EVA Use to read blood sugars as per 's instructions. 1 each 0    Continuous Glucose Sensor (FREESTYLE EKATERINA 3 SENSOR) MISC 1 each every 14 days. 2 each 2    fluticasone (FLONASE) 50 MCG/ACT nasal spray Spray 2 sprays into both nostrils daily. 48 g 1    fluticasone-salmeterol (ADVAIR) 250-50 MCG/ACT inhaler Inhale 1 puff  "into the lungs every 12 hours. 60 each 5    fluticasone-salmeterol (ADVAIR) 500-50 MCG/ACT inhaler Inhale 1 puff into the lungs 2 times daily. 60 each 5    ketotifen fumarate 0.035% 0.035 % SOLN ophthalmic solution Place 1 drop into both eyes every 12 hours 5 mL 0    levocetirizine (XYZAL) 5 MG tablet Take 1 tablet (5 mg) by mouth 2 times daily 60 tablet 3    loratadine-pseudoePHEDrine (SM ALLERGY RELIEF D)  MG 24 hr tablet Take 1 tablet by mouth daily 30 tablet 1    metFORMIN (GLUCOPHAGE XR) 500 MG 24 hr tablet Take 2 tablets (1,000 mg) by mouth 2 times daily (with meals). 360 tablet 0    modafinil (PROVIGIL) 200 MG tablet Take 1 tablet (200 mg) by mouth daily Take 1 tab every morning for sleepiness due to KRISTEL 30 tablet 0    montelukast (SINGULAIR) 10 MG tablet Take 1 tablet (10 mg) by mouth at bedtime. 90 tablet 3    MULTI-VITAMIN OR TABS       rosuvastatin (CRESTOR) 10 MG tablet Take 1 tablet (10 mg) by mouth daily. 90 tablet 2    sertraline (ZOLOFT) 50 MG tablet Take 1 tablet (50 mg) by mouth daily as needed (sex) Take one tablet 8-12 hours before sex 30 tablet 1    sildenafil (VIAGRA) 25 MG tablet Take 1 tablet (25 mg) by mouth daily as needed 90 tablet 0    thin (NO BRAND SPECIFIED) lancets Use with lanceting device. To accompany: Blood Glucose Monitor Brands: per insurance. 100 each 0    Tirzepatide 10 MG/0.5ML SOAJ Inject 0.5 mLs (10 mg) subcutaneously every 7 days. 2 mL 2    triamcinolone (KENALOG) 0.1 % external ointment Apply topically 2 times daily 30 g 1    valACYclovir (VALTREX) 500 MG tablet Take 1 tablet (500 mg) by mouth 2 times daily. 30 tablet 0          Objective:    Vitals:    01/28/25 1434   BP: 110/70   Pulse: 90   Resp: 16   Temp: 98  F (36.7  C)   SpO2: 95%   Weight: 95.3 kg (210 lb)   Height: 1.803 m (5' 11\")      Body mass index is 29.29 kg/m .    Alert.  No apparent distress.  Chest clear.  Cardiac exam regular.  Hyperkeratotic scaling with some vesicular evidence plantar aspect " right foot plantar aspect.      This note has been dictated using voice recognition software and as a result may contain minor grammatical errors and unintended word substitutions.       Answers submitted by the patient for this visit:  Lipid Visit (Submitted on 1/28/2025)  Chief Complaint: Chronic problems general questions HPI Form  Are you regularly taking any medication or supplement to lower your cholesterol?: No  Are you having muscle aches or other side effects that you think could be caused by your cholesterol lowering medication?: Yes  Diabetes Visit (Submitted on 1/28/2025)  Chief Complaint: Chronic problems general questions HPI Form  Frequency of checking blood sugars:: not at all  Diabetic concerns:: frequent infections, other  Paraesthesia present:: redness, sores, or blisters on feet, excessive thirst  Have you had a diabetic eye exam within the last year?: No  General Questionnaire (Submitted on 1/28/2025)  Chief Complaint: Chronic problems general questions HPI Form  How many servings of fruits and vegetables do you eat daily?: 0-1  On average, how many sweetened beverages do you drink each day (Examples: soda, juice, sweet tea, etc.  Do NOT count diet or artificially sweetened beverages)?: 0  How many minutes a day do you exercise enough to make your heart beat faster?: 9 or less  How many days a week do you exercise enough to make your heart beat faster?: 3 or less  How many days per week do you miss taking your medication?: 0  Questionnaire about: Chronic problems general questions HPI Form (Submitted on 1/28/2025)  Chief Complaint: Chronic problems general questions HPI Form

## 2025-01-29 LAB
ANION GAP SERPL CALCULATED.3IONS-SCNC: 15 MMOL/L (ref 7–15)
BUN SERPL-MCNC: 12.3 MG/DL (ref 6–20)
CALCIUM SERPL-MCNC: 9.4 MG/DL (ref 8.8–10.4)
CHLORIDE SERPL-SCNC: 101 MMOL/L (ref 98–107)
CREAT SERPL-MCNC: 0.82 MG/DL (ref 0.67–1.17)
EGFRCR SERPLBLD CKD-EPI 2021: >90 ML/MIN/1.73M2
GLUCOSE SERPL-MCNC: 113 MG/DL (ref 70–99)
HCO3 SERPL-SCNC: 23 MMOL/L (ref 22–29)
POTASSIUM SERPL-SCNC: 4.1 MMOL/L (ref 3.4–5.3)
SODIUM SERPL-SCNC: 139 MMOL/L (ref 135–145)

## 2025-02-10 DIAGNOSIS — B00.1 COLD SORE: ICD-10-CM

## 2025-02-10 RX ORDER — VALACYCLOVIR HYDROCHLORIDE 500 MG/1
500 TABLET, FILM COATED ORAL 2 TIMES DAILY
Qty: 30 TABLET | Refills: 5 | Status: SHIPPED | OUTPATIENT
Start: 2025-02-10

## 2025-03-04 ENCOUNTER — MYC REFILL (OUTPATIENT)
Dept: FAMILY MEDICINE | Facility: CLINIC | Age: 50
End: 2025-03-04
Payer: COMMERCIAL

## 2025-03-04 DIAGNOSIS — G47.33 OSA (OBSTRUCTIVE SLEEP APNEA): ICD-10-CM

## 2025-03-04 DIAGNOSIS — E11.9 TYPE 2 DIABETES MELLITUS WITHOUT COMPLICATION, WITHOUT LONG-TERM CURRENT USE OF INSULIN (H): ICD-10-CM

## 2025-03-04 RX ORDER — METFORMIN HYDROCHLORIDE 500 MG/1
1000 TABLET, EXTENDED RELEASE ORAL 2 TIMES DAILY WITH MEALS
Qty: 360 TABLET | Refills: 0 | Status: SHIPPED | OUTPATIENT
Start: 2025-03-04

## 2025-03-04 RX ORDER — MODAFINIL 200 MG/1
200 TABLET ORAL DAILY
Qty: 30 TABLET | Refills: 0 | Status: SHIPPED | OUTPATIENT
Start: 2025-03-04

## 2025-03-06 DIAGNOSIS — E11.65 TYPE 2 DIABETES MELLITUS WITH HYPERGLYCEMIA, WITHOUT LONG-TERM CURRENT USE OF INSULIN (H): ICD-10-CM

## 2025-03-06 DIAGNOSIS — E66.811 CLASS 1 OBESITY WITH SERIOUS COMORBIDITY AND BODY MASS INDEX (BMI) OF 30.0 TO 30.9 IN ADULT, UNSPECIFIED OBESITY TYPE: Chronic | ICD-10-CM

## 2025-03-06 NOTE — TELEPHONE ENCOUNTER
Medication Question or Refill    Contacts       Contact Date/Time Type Contact Phone/Fax    03/06/2025 08:37 AM CST Phone (Incoming) Juanjose Collins (Self) [phone] (M)            What medication are you calling about (include dose and sig)?: tirzepatide 10mg    Preferred Pharmacy:   [address]  [address] [place] [id number]  Phone: [phone] Fax: [phone]      Controlled Substance Agreement on file:   CSA -- Patient Level:    CSA: None found at the patient level.       Who prescribed the medication?: Nadir    Do you need a refill? Yes    When did you use the medication last? weekly    Patient offered an appointment? No    Do you have any questions or concerns?  Yes: *please also see [employer] message from 3/4/25, pt inquiring about what may possibly by sertraline, but could not tell me if that was correct or not and wants to get a refill or medication to help with focus      Could we send this information to you in [place] or would you prefer to receive a phone call?:   Patient would prefer a phone call   Okay to leave a detailed message?: Yes at Cell number on file:    Telephone Information:   Mobile [phone]

## 2025-03-10 ENCOUNTER — MYC REFILL (OUTPATIENT)
Dept: UROLOGY | Facility: CLINIC | Age: 50
End: 2025-03-10
Payer: COMMERCIAL

## 2025-03-10 DIAGNOSIS — F52.4 PREMATURE EJACULATION: ICD-10-CM

## 2025-03-10 DIAGNOSIS — F32.A DEPRESSION, UNSPECIFIED DEPRESSION TYPE: Primary | ICD-10-CM

## 2025-05-10 ENCOUNTER — HEALTH MAINTENANCE LETTER (OUTPATIENT)
Age: 50
End: 2025-05-10

## 2025-06-02 DIAGNOSIS — E11.65 TYPE 2 DIABETES MELLITUS WITH HYPERGLYCEMIA, WITHOUT LONG-TERM CURRENT USE OF INSULIN (H): ICD-10-CM

## 2025-06-02 DIAGNOSIS — E66.811 CLASS 1 OBESITY WITH SERIOUS COMORBIDITY AND BODY MASS INDEX (BMI) OF 30.0 TO 30.9 IN ADULT, UNSPECIFIED OBESITY TYPE: Chronic | ICD-10-CM

## 2025-06-02 RX ORDER — TIRZEPATIDE 10 MG/.5ML
INJECTION, SOLUTION SUBCUTANEOUS
Qty: 2 ML | Refills: 0 | Status: SHIPPED | OUTPATIENT
Start: 2025-06-02

## 2025-06-12 ENCOUNTER — MYC MEDICAL ADVICE (OUTPATIENT)
Dept: FAMILY MEDICINE | Facility: CLINIC | Age: 50
End: 2025-06-12

## 2025-06-12 NOTE — LETTER
July 22, 2025      Silviano W Dennis  [address]  [place] [address]        Hello!    We see that you are due for an eye exam.  Eye problems are a serious complication of diabetes and we want to make sure that your eyes are healthy.   Diabetes can lead to diabetic retinopathy, diabetic macular edema and blindness.  Please schedule an eye exam with an eye professional (ophthamology or optometrist) and let your PCP here at [facility] aware once it is completed so the results get in your record.    If you have had a recent eye exam, in the past year, please send us the location and date that this was completed so we can update your records.     Thank you!    AKIL StovallN, RN  [facility]

## 2025-06-29 ENCOUNTER — MYC REFILL (OUTPATIENT)
Dept: FAMILY MEDICINE | Facility: CLINIC | Age: 50
End: 2025-06-29
Payer: COMMERCIAL

## 2025-06-29 DIAGNOSIS — B00.1 COLD SORE: ICD-10-CM

## 2025-06-29 DIAGNOSIS — J45.20 MILD INTERMITTENT ASTHMA, UNSPECIFIED WHETHER COMPLICATED: ICD-10-CM

## 2025-06-29 DIAGNOSIS — J30.89 ALLERGIC RHINITIS DUE TO DUST MITE: ICD-10-CM

## 2025-06-29 DIAGNOSIS — H10.13 ALLERGIC CONJUNCTIVITIS, BILATERAL: ICD-10-CM

## 2025-06-29 DIAGNOSIS — E11.65 TYPE 2 DIABETES MELLITUS WITH HYPERGLYCEMIA, WITHOUT LONG-TERM CURRENT USE OF INSULIN (H): ICD-10-CM

## 2025-06-29 DIAGNOSIS — E78.5 HYPERLIPIDEMIA, UNSPECIFIED HYPERLIPIDEMIA TYPE: ICD-10-CM

## 2025-06-29 DIAGNOSIS — E66.811 CLASS 1 OBESITY WITH SERIOUS COMORBIDITY AND BODY MASS INDEX (BMI) OF 30.0 TO 30.9 IN ADULT, UNSPECIFIED OBESITY TYPE: Chronic | ICD-10-CM

## 2025-06-30 RX ORDER — MONTELUKAST SODIUM 10 MG/1
10 TABLET ORAL AT BEDTIME
Qty: 90 TABLET | Refills: 3 | Status: SHIPPED | OUTPATIENT
Start: 2025-06-30

## 2025-06-30 RX ORDER — VALACYCLOVIR HYDROCHLORIDE 500 MG/1
500 TABLET, FILM COATED ORAL 2 TIMES DAILY
Qty: 180 TABLET | Refills: 1 | Status: SHIPPED | OUTPATIENT
Start: 2025-06-30

## 2025-06-30 RX ORDER — TIRZEPATIDE 10 MG/.5ML
10 INJECTION, SOLUTION SUBCUTANEOUS WEEKLY
Qty: 2 ML | Refills: 1 | Status: SHIPPED | OUTPATIENT
Start: 2025-06-30

## 2025-06-30 RX ORDER — ROSUVASTATIN CALCIUM 10 MG/1
10 TABLET, COATED ORAL DAILY
Qty: 90 TABLET | Refills: 1 | Status: SHIPPED | OUTPATIENT
Start: 2025-06-30

## 2025-07-22 NOTE — TELEPHONE ENCOUNTER
Patient Quality Outreach    Patient is due for the following:   Diabetes -  Eye Exam    Action(s) Taken:   Patient to schedule eye exam     Type of outreach:    Sent [employer] message. and Sent letter.    Questions for provider review:    None         Patsy Saez RN  Chart routed to None.

## 2025-08-07 ENCOUNTER — PATIENT OUTREACH (OUTPATIENT)
Dept: CARE COORDINATION | Facility: CLINIC | Age: 50
End: 2025-08-07
Payer: COMMERCIAL

## 2025-08-18 ENCOUNTER — MYC REFILL (OUTPATIENT)
Dept: UROLOGY | Facility: CLINIC | Age: 50
End: 2025-08-18
Payer: COMMERCIAL

## 2025-08-18 ENCOUNTER — MYC REFILL (OUTPATIENT)
Dept: FAMILY MEDICINE | Facility: CLINIC | Age: 50
End: 2025-08-18
Payer: COMMERCIAL

## 2025-08-18 DIAGNOSIS — F32.A DEPRESSION, UNSPECIFIED DEPRESSION TYPE: ICD-10-CM

## 2025-08-18 DIAGNOSIS — E11.65 TYPE 2 DIABETES MELLITUS WITH HYPERGLYCEMIA, WITHOUT LONG-TERM CURRENT USE OF INSULIN (H): ICD-10-CM

## 2025-08-18 DIAGNOSIS — H10.13 ALLERGIC CONJUNCTIVITIS, BILATERAL: ICD-10-CM

## 2025-08-18 DIAGNOSIS — J30.89 ALLERGIC RHINITIS DUE TO DUST MITE: ICD-10-CM

## 2025-08-18 DIAGNOSIS — E66.811 CLASS 1 OBESITY WITH SERIOUS COMORBIDITY AND BODY MASS INDEX (BMI) OF 30.0 TO 30.9 IN ADULT, UNSPECIFIED OBESITY TYPE: Chronic | ICD-10-CM

## 2025-08-18 RX ORDER — FLUTICASONE PROPIONATE 50 MCG
2 SPRAY, SUSPENSION (ML) NASAL DAILY
Qty: 48 G | Refills: 1 | Status: SHIPPED | OUTPATIENT
Start: 2025-08-18

## 2025-08-18 RX ORDER — AZELASTINE 1 MG/ML
2 SPRAY, METERED NASAL 2 TIMES DAILY
Qty: 30 ML | Refills: 5 | Status: SHIPPED | OUTPATIENT
Start: 2025-08-18

## 2025-08-18 RX ORDER — TIRZEPATIDE 10 MG/.5ML
10 INJECTION, SOLUTION SUBCUTANEOUS WEEKLY
Qty: 2 ML | Refills: 1 | Status: SHIPPED | OUTPATIENT
Start: 2025-08-18

## 2025-08-20 ENCOUNTER — OFFICE VISIT (OUTPATIENT)
Dept: FAMILY MEDICINE | Facility: CLINIC | Age: 50
End: 2025-08-20
Payer: COMMERCIAL

## 2025-08-20 VITALS
TEMPERATURE: 98.7 F | HEART RATE: 87 BPM | RESPIRATION RATE: 15 BRPM | HEIGHT: 71 IN | OXYGEN SATURATION: 98 % | WEIGHT: 187 LBS | DIASTOLIC BLOOD PRESSURE: 70 MMHG | BODY MASS INDEX: 26.18 KG/M2 | SYSTOLIC BLOOD PRESSURE: 106 MMHG

## 2025-08-20 DIAGNOSIS — E78.5 HYPERLIPIDEMIA, UNSPECIFIED HYPERLIPIDEMIA TYPE: ICD-10-CM

## 2025-08-20 DIAGNOSIS — E11.65 TYPE 2 DIABETES MELLITUS WITH HYPERGLYCEMIA, WITHOUT LONG-TERM CURRENT USE OF INSULIN (H): Primary | ICD-10-CM

## 2025-08-20 DIAGNOSIS — L30.1 DYSHIDROTIC ECZEMA: ICD-10-CM

## 2025-08-20 DIAGNOSIS — E66.3 OVERWEIGHT: ICD-10-CM

## 2025-08-20 DIAGNOSIS — R68.82 DECREASED LIBIDO: ICD-10-CM

## 2025-08-20 LAB
ANION GAP SERPL CALCULATED.3IONS-SCNC: 12 MMOL/L (ref 7–15)
BUN SERPL-MCNC: 13.5 MG/DL (ref 6–20)
CALCIUM SERPL-MCNC: 9.5 MG/DL (ref 8.8–10.4)
CHLORIDE SERPL-SCNC: 104 MMOL/L (ref 98–107)
CREAT SERPL-MCNC: 0.84 MG/DL (ref 0.67–1.17)
CREAT UR-MCNC: 171 MG/DL
EGFRCR SERPLBLD CKD-EPI 2021: >90 ML/MIN/1.73M2
EST. AVERAGE GLUCOSE BLD GHB EST-MCNC: 114 MG/DL
GLUCOSE SERPL-MCNC: 89 MG/DL (ref 70–99)
HBA1C MFR BLD: 5.6 % (ref 0–5.6)
HCO3 SERPL-SCNC: 23 MMOL/L (ref 22–29)
HOLD SPECIMEN: NORMAL
HOLD SPECIMEN: NORMAL
MICROALBUMIN UR-MCNC: <12 MG/L
MICROALBUMIN/CREAT UR: NORMAL MG/G{CREAT}
POTASSIUM SERPL-SCNC: 4.2 MMOL/L (ref 3.4–5.3)
SODIUM SERPL-SCNC: 139 MMOL/L (ref 135–145)

## 2025-08-20 PROCEDURE — 1125F AMNT PAIN NOTED PAIN PRSNT: CPT | Performed by: FAMILY MEDICINE

## 2025-08-20 PROCEDURE — 3074F SYST BP LT 130 MM HG: CPT | Performed by: FAMILY MEDICINE

## 2025-08-20 PROCEDURE — 83036 HEMOGLOBIN GLYCOSYLATED A1C: CPT | Performed by: FAMILY MEDICINE

## 2025-08-20 PROCEDURE — G2211 COMPLEX E/M VISIT ADD ON: HCPCS | Performed by: FAMILY MEDICINE

## 2025-08-20 PROCEDURE — 3078F DIAST BP <80 MM HG: CPT | Performed by: FAMILY MEDICINE

## 2025-08-20 PROCEDURE — 80048 BASIC METABOLIC PNL TOTAL CA: CPT | Performed by: FAMILY MEDICINE

## 2025-08-20 PROCEDURE — 82043 UR ALBUMIN QUANTITATIVE: CPT | Performed by: FAMILY MEDICINE

## 2025-08-20 PROCEDURE — 82570 ASSAY OF URINE CREATININE: CPT | Performed by: FAMILY MEDICINE

## 2025-08-20 PROCEDURE — 3044F HG A1C LEVEL LT 7.0%: CPT | Performed by: FAMILY MEDICINE

## 2025-08-20 PROCEDURE — 36415 COLL VENOUS BLD VENIPUNCTURE: CPT | Performed by: FAMILY MEDICINE

## 2025-08-20 PROCEDURE — 99214 OFFICE O/P EST MOD 30 MIN: CPT | Performed by: FAMILY MEDICINE

## 2025-08-20 ASSESSMENT — ASTHMA QUESTIONNAIRES
QUESTION_4 LAST FOUR WEEKS HOW OFTEN HAVE YOU USED YOUR RESCUE INHALER OR NEBULIZER MEDICATION (SUCH AS ALBUTEROL): TWO OR THREE TIMES PER WEEK
QUESTION_2 LAST FOUR WEEKS HOW OFTEN HAVE YOU HAD SHORTNESS OF BREATH: ONCE OR TWICE A WEEK
QUESTION_3 LAST FOUR WEEKS HOW OFTEN DID YOUR ASTHMA SYMPTOMS (WHEEZING, COUGHING, SHORTNESS OF BREATH, CHEST TIGHTNESS OR PAIN) WAKE YOU UP AT NIGHT OR EARLIER THAN USUAL IN THE MORNING: ONCE OR TWICE
ACT_TOTALSCORE: 18
QUESTION_5 LAST FOUR WEEKS HOW WOULD YOU RATE YOUR ASTHMA CONTROL: SOMEWHAT CONTROLLED
QUESTION_1 LAST FOUR WEEKS HOW MUCH OF THE TIME DID YOUR ASTHMA KEEP YOU FROM GETTING AS MUCH DONE AT WORK, SCHOOL OR AT HOME: A LITTLE OF THE TIME

## 2025-08-20 ASSESSMENT — PAIN SCALES - GENERAL: PAINLEVEL_OUTOF10: MILD PAIN (1)
